# Patient Record
Sex: MALE | Race: WHITE | Employment: UNEMPLOYED | URBAN - METROPOLITAN AREA
[De-identification: names, ages, dates, MRNs, and addresses within clinical notes are randomized per-mention and may not be internally consistent; named-entity substitution may affect disease eponyms.]

---

## 2017-10-26 ENCOUNTER — APPOINTMENT (OUTPATIENT)
Dept: GENERAL RADIOLOGY | Facility: CLINIC | Age: 67
DRG: 292 | End: 2017-10-26
Attending: EMERGENCY MEDICINE
Payer: COMMERCIAL

## 2017-10-26 ENCOUNTER — HOSPITAL ENCOUNTER (INPATIENT)
Facility: CLINIC | Age: 67
LOS: 5 days | Discharge: HOME | DRG: 292 | End: 2017-10-31
Attending: EMERGENCY MEDICINE | Admitting: INTERNAL MEDICINE
Payer: COMMERCIAL

## 2017-10-26 ENCOUNTER — OFFICE VISIT (OUTPATIENT)
Dept: URGENT CARE | Facility: URGENT CARE | Age: 67
End: 2017-10-26
Payer: COMMERCIAL

## 2017-10-26 ENCOUNTER — APPOINTMENT (OUTPATIENT)
Dept: CT IMAGING | Facility: CLINIC | Age: 67
DRG: 292 | End: 2017-10-26
Attending: EMERGENCY MEDICINE
Payer: COMMERCIAL

## 2017-10-26 VITALS
DIASTOLIC BLOOD PRESSURE: 118 MMHG | WEIGHT: 198.19 LBS | HEART RATE: 87 BPM | OXYGEN SATURATION: 93 % | TEMPERATURE: 96.9 F | BODY MASS INDEX: 31.27 KG/M2 | SYSTOLIC BLOOD PRESSURE: 200 MMHG

## 2017-10-26 DIAGNOSIS — I50.21 ACUTE SYSTOLIC CONGESTIVE HEART FAILURE (H): ICD-10-CM

## 2017-10-26 DIAGNOSIS — R03.0 ELEVATED BLOOD PRESSURE READING: ICD-10-CM

## 2017-10-26 DIAGNOSIS — R42 DIZZINESS: Primary | ICD-10-CM

## 2017-10-26 DIAGNOSIS — I16.9 HYPERTENSIVE CRISIS: ICD-10-CM

## 2017-10-26 DIAGNOSIS — I73.9 PAD (PERIPHERAL ARTERY DISEASE) (H): Primary | ICD-10-CM

## 2017-10-26 DIAGNOSIS — R06.02 SHORTNESS OF BREATH: ICD-10-CM

## 2017-10-26 PROBLEM — I16.1 HYPERTENSIVE EMERGENCY: Status: ACTIVE | Noted: 2017-10-26

## 2017-10-26 LAB
ALBUMIN SERPL-MCNC: 3.3 G/DL (ref 3.4–5)
ALP SERPL-CCNC: 64 U/L (ref 40–150)
ALT SERPL W P-5'-P-CCNC: 26 U/L (ref 0–70)
ANION GAP SERPL CALCULATED.3IONS-SCNC: 10 MMOL/L (ref 3–14)
AST SERPL W P-5'-P-CCNC: 14 U/L (ref 0–45)
BASE EXCESS BLDV CALC-SCNC: 0.4 MMOL/L
BASOPHILS # BLD AUTO: 0 10E9/L (ref 0–0.2)
BASOPHILS NFR BLD AUTO: 0.2 %
BILIRUB SERPL-MCNC: 0.8 MG/DL (ref 0.2–1.3)
BUN SERPL-MCNC: 16 MG/DL (ref 7–30)
CALCIUM SERPL-MCNC: 9 MG/DL (ref 8.5–10.1)
CHLORIDE SERPL-SCNC: 102 MMOL/L (ref 94–109)
CO2 SERPL-SCNC: 23 MMOL/L (ref 20–32)
CREAT SERPL-MCNC: 0.94 MG/DL (ref 0.66–1.25)
DIFFERENTIAL METHOD BLD: ABNORMAL
EOSINOPHIL # BLD AUTO: 0.1 10E9/L (ref 0–0.7)
EOSINOPHIL NFR BLD AUTO: 0.7 %
ERYTHROCYTE [DISTWIDTH] IN BLOOD BY AUTOMATED COUNT: 12.8 % (ref 10–15)
GFR SERPL CREATININE-BSD FRML MDRD: 81 ML/MIN/1.7M2
GLUCOSE SERPL-MCNC: 118 MG/DL (ref 70–99)
HCO3 BLDV-SCNC: 24 MMOL/L (ref 21–28)
HCT VFR BLD AUTO: 40.4 % (ref 40–53)
HGB BLD-MCNC: 14.4 G/DL (ref 13.3–17.7)
IMM GRANULOCYTES # BLD: 0 10E9/L (ref 0–0.4)
IMM GRANULOCYTES NFR BLD: 0.2 %
INTERPRETATION ECG - MUSE: NORMAL
LACTATE BLD-SCNC: 1 MMOL/L (ref 0.7–2)
LYMPHOCYTES # BLD AUTO: 0.6 10E9/L (ref 0.8–5.3)
LYMPHOCYTES NFR BLD AUTO: 4.6 %
MCH RBC QN AUTO: 30.6 PG (ref 26.5–33)
MCHC RBC AUTO-ENTMCNC: 35.6 G/DL (ref 31.5–36.5)
MCV RBC AUTO: 86 FL (ref 78–100)
MONOCYTES # BLD AUTO: 0.8 10E9/L (ref 0–1.3)
MONOCYTES NFR BLD AUTO: 5.9 %
NEUTROPHILS # BLD AUTO: 11.4 10E9/L (ref 1.6–8.3)
NEUTROPHILS NFR BLD AUTO: 88.4 %
NRBC # BLD AUTO: 0 10*3/UL
NRBC BLD AUTO-RTO: 0 /100
NT-PROBNP SERPL-MCNC: 3149 PG/ML (ref 0–900)
OXYHGB MFR BLDV: 69 %
PCO2 BLDV: 35 MM HG (ref 40–50)
PH BLDV: 7.45 PH (ref 7.32–7.43)
PLATELET # BLD AUTO: 154 10E9/L (ref 150–450)
PO2 BLDV: 34 MM HG (ref 25–47)
POTASSIUM SERPL-SCNC: 3.7 MMOL/L (ref 3.4–5.3)
PROT SERPL-MCNC: 7.4 G/DL (ref 6.8–8.8)
RBC # BLD AUTO: 4.71 10E12/L (ref 4.4–5.9)
SODIUM SERPL-SCNC: 135 MMOL/L (ref 133–144)
TROPONIN I SERPL-MCNC: 0.03 UG/L (ref 0–0.04)
WBC # BLD AUTO: 12.9 10E9/L (ref 4–11)

## 2017-10-26 PROCEDURE — 83735 ASSAY OF MAGNESIUM: CPT | Performed by: INTERNAL MEDICINE

## 2017-10-26 PROCEDURE — 25000125 ZZHC RX 250: Performed by: EMERGENCY MEDICINE

## 2017-10-26 PROCEDURE — 82805 BLOOD GASES W/O2 SATURATION: CPT | Performed by: EMERGENCY MEDICINE

## 2017-10-26 PROCEDURE — 71260 CT THORAX DX C+: CPT

## 2017-10-26 PROCEDURE — 96366 THER/PROPH/DIAG IV INF ADDON: CPT

## 2017-10-26 PROCEDURE — 21000000 ZZH R&B IMCU HEART CARE

## 2017-10-26 PROCEDURE — 36415 COLL VENOUS BLD VENIPUNCTURE: CPT | Performed by: INTERNAL MEDICINE

## 2017-10-26 PROCEDURE — 99223 1ST HOSP IP/OBS HIGH 75: CPT | Mod: AI | Performed by: INTERNAL MEDICINE

## 2017-10-26 PROCEDURE — 83880 ASSAY OF NATRIURETIC PEPTIDE: CPT | Performed by: EMERGENCY MEDICINE

## 2017-10-26 PROCEDURE — 93000 ELECTROCARDIOGRAM COMPLETE: CPT | Performed by: NURSE PRACTITIONER

## 2017-10-26 PROCEDURE — 80053 COMPREHEN METABOLIC PANEL: CPT | Performed by: EMERGENCY MEDICINE

## 2017-10-26 PROCEDURE — 99207 ZZC OFFICE-HOSPITAL ADMIT: CPT | Performed by: NURSE PRACTITIONER

## 2017-10-26 PROCEDURE — 25000128 H RX IP 250 OP 636: Performed by: EMERGENCY MEDICINE

## 2017-10-26 PROCEDURE — 84484 ASSAY OF TROPONIN QUANT: CPT | Performed by: EMERGENCY MEDICINE

## 2017-10-26 PROCEDURE — 85025 COMPLETE CBC W/AUTO DIFF WBC: CPT | Performed by: EMERGENCY MEDICINE

## 2017-10-26 PROCEDURE — 25000128 H RX IP 250 OP 636

## 2017-10-26 PROCEDURE — 25000132 ZZH RX MED GY IP 250 OP 250 PS 637: Performed by: EMERGENCY MEDICINE

## 2017-10-26 PROCEDURE — 96365 THER/PROPH/DIAG IV INF INIT: CPT

## 2017-10-26 PROCEDURE — 93005 ELECTROCARDIOGRAM TRACING: CPT

## 2017-10-26 PROCEDURE — 25000132 ZZH RX MED GY IP 250 OP 250 PS 637

## 2017-10-26 PROCEDURE — 71020 XR CHEST 2 VW: CPT

## 2017-10-26 PROCEDURE — 99285 EMERGENCY DEPT VISIT HI MDM: CPT | Mod: 25

## 2017-10-26 PROCEDURE — 96375 TX/PRO/DX INJ NEW DRUG ADDON: CPT

## 2017-10-26 PROCEDURE — 83605 ASSAY OF LACTIC ACID: CPT | Performed by: INTERNAL MEDICINE

## 2017-10-26 PROCEDURE — 84484 ASSAY OF TROPONIN QUANT: CPT | Performed by: INTERNAL MEDICINE

## 2017-10-26 PROCEDURE — 84443 ASSAY THYROID STIM HORMONE: CPT | Performed by: INTERNAL MEDICINE

## 2017-10-26 PROCEDURE — 25000128 H RX IP 250 OP 636: Performed by: INTERNAL MEDICINE

## 2017-10-26 RX ORDER — POTASSIUM CHLORIDE 1500 MG/1
20-40 TABLET, EXTENDED RELEASE ORAL
Status: DISCONTINUED | OUTPATIENT
Start: 2017-10-26 | End: 2017-10-30

## 2017-10-26 RX ORDER — ONDANSETRON 2 MG/ML
4 INJECTION INTRAMUSCULAR; INTRAVENOUS EVERY 6 HOURS PRN
Status: DISCONTINUED | OUTPATIENT
Start: 2017-10-26 | End: 2017-10-31 | Stop reason: HOSPADM

## 2017-10-26 RX ORDER — LATANOPROST 50 UG/ML
1 SOLUTION/ DROPS OPHTHALMIC AT BEDTIME
COMMUNITY

## 2017-10-26 RX ORDER — FUROSEMIDE 10 MG/ML
20 INJECTION INTRAMUSCULAR; INTRAVENOUS DAILY
Status: DISCONTINUED | OUTPATIENT
Start: 2017-10-27 | End: 2017-10-27

## 2017-10-26 RX ORDER — PROCHLORPERAZINE 25 MG
12.5 SUPPOSITORY, RECTAL RECTAL EVERY 12 HOURS PRN
Status: DISCONTINUED | OUTPATIENT
Start: 2017-10-26 | End: 2017-10-31 | Stop reason: HOSPADM

## 2017-10-26 RX ORDER — AMOXICILLIN 250 MG
1-2 CAPSULE ORAL 2 TIMES DAILY PRN
Status: DISCONTINUED | OUTPATIENT
Start: 2017-10-26 | End: 2017-10-31 | Stop reason: HOSPADM

## 2017-10-26 RX ORDER — LABETALOL HYDROCHLORIDE 5 MG/ML
20 INJECTION, SOLUTION INTRAVENOUS ONCE
Status: COMPLETED | OUTPATIENT
Start: 2017-10-26 | End: 2017-10-26

## 2017-10-26 RX ORDER — NITROGLYCERIN 20 MG/100ML
INJECTION INTRAVENOUS
Status: COMPLETED
Start: 2017-10-26 | End: 2017-10-26

## 2017-10-26 RX ORDER — LABETALOL HYDROCHLORIDE 5 MG/ML
INJECTION, SOLUTION INTRAVENOUS
Status: COMPLETED
Start: 2017-10-26 | End: 2017-10-26

## 2017-10-26 RX ORDER — LEVETIRACETAM 500 MG/1
500 TABLET ORAL 2 TIMES DAILY
Status: DISCONTINUED | OUTPATIENT
Start: 2017-10-26 | End: 2017-10-31 | Stop reason: HOSPADM

## 2017-10-26 RX ORDER — ONDANSETRON 4 MG/1
4 TABLET, ORALLY DISINTEGRATING ORAL EVERY 6 HOURS PRN
Status: DISCONTINUED | OUTPATIENT
Start: 2017-10-26 | End: 2017-10-31 | Stop reason: HOSPADM

## 2017-10-26 RX ORDER — POLYETHYLENE GLYCOL 3350 17 G/17G
17 POWDER, FOR SOLUTION ORAL DAILY PRN
Status: DISCONTINUED | OUTPATIENT
Start: 2017-10-26 | End: 2017-10-31 | Stop reason: HOSPADM

## 2017-10-26 RX ORDER — NITROGLYCERIN 0.4 MG/1
0.4 TABLET SUBLINGUAL EVERY 5 MIN PRN
Status: DISCONTINUED | OUTPATIENT
Start: 2017-10-26 | End: 2017-10-26

## 2017-10-26 RX ORDER — METOPROLOL TARTRATE 1 MG/ML
5 INJECTION, SOLUTION INTRAVENOUS ONCE
Status: COMPLETED | OUTPATIENT
Start: 2017-10-26 | End: 2017-10-26

## 2017-10-26 RX ORDER — PROCHLORPERAZINE MALEATE 5 MG
5 TABLET ORAL EVERY 6 HOURS PRN
Status: DISCONTINUED | OUTPATIENT
Start: 2017-10-26 | End: 2017-10-31 | Stop reason: HOSPADM

## 2017-10-26 RX ORDER — NALOXONE HYDROCHLORIDE 0.4 MG/ML
.1-.4 INJECTION, SOLUTION INTRAMUSCULAR; INTRAVENOUS; SUBCUTANEOUS
Status: DISCONTINUED | OUTPATIENT
Start: 2017-10-26 | End: 2017-10-31 | Stop reason: HOSPADM

## 2017-10-26 RX ORDER — FUROSEMIDE 10 MG/ML
INJECTION INTRAMUSCULAR; INTRAVENOUS
Status: COMPLETED
Start: 2017-10-26 | End: 2017-10-26

## 2017-10-26 RX ORDER — ASPIRIN 81 MG/1
324 TABLET, CHEWABLE ORAL ONCE
Status: COMPLETED | OUTPATIENT
Start: 2017-10-26 | End: 2017-10-26

## 2017-10-26 RX ORDER — IOPAMIDOL 755 MG/ML
72 INJECTION, SOLUTION INTRAVASCULAR ONCE
Status: COMPLETED | OUTPATIENT
Start: 2017-10-26 | End: 2017-10-26

## 2017-10-26 RX ORDER — POTASSIUM CHLORIDE 1.5 G/1.58G
20-40 POWDER, FOR SOLUTION ORAL
Status: DISCONTINUED | OUTPATIENT
Start: 2017-10-26 | End: 2017-10-30

## 2017-10-26 RX ORDER — POTASSIUM CHLORIDE 29.8 MG/ML
20 INJECTION INTRAVENOUS
Status: DISCONTINUED | OUTPATIENT
Start: 2017-10-26 | End: 2017-10-30

## 2017-10-26 RX ORDER — HYDRALAZINE HYDROCHLORIDE 20 MG/ML
10 INJECTION INTRAMUSCULAR; INTRAVENOUS EVERY 4 HOURS PRN
Status: DISCONTINUED | OUTPATIENT
Start: 2017-10-26 | End: 2017-10-31 | Stop reason: HOSPADM

## 2017-10-26 RX ORDER — NITROGLYCERIN 20 MG/100ML
.07-2 INJECTION INTRAVENOUS CONTINUOUS
Status: DISCONTINUED | OUTPATIENT
Start: 2017-10-26 | End: 2017-10-26

## 2017-10-26 RX ORDER — FUROSEMIDE 10 MG/ML
20 INJECTION INTRAMUSCULAR; INTRAVENOUS ONCE
Status: COMPLETED | OUTPATIENT
Start: 2017-10-26 | End: 2017-10-26

## 2017-10-26 RX ORDER — NITROGLYCERIN 0.4 MG/1
TABLET SUBLINGUAL
Status: COMPLETED
Start: 2017-10-26 | End: 2017-10-26

## 2017-10-26 RX ORDER — MAGNESIUM SULFATE HEPTAHYDRATE 40 MG/ML
4 INJECTION, SOLUTION INTRAVENOUS EVERY 4 HOURS PRN
Status: DISCONTINUED | OUTPATIENT
Start: 2017-10-26 | End: 2017-10-31 | Stop reason: HOSPADM

## 2017-10-26 RX ORDER — BISACODYL 10 MG
10 SUPPOSITORY, RECTAL RECTAL DAILY PRN
Status: DISCONTINUED | OUTPATIENT
Start: 2017-10-26 | End: 2017-10-31 | Stop reason: HOSPADM

## 2017-10-26 RX ORDER — ACETAMINOPHEN 325 MG/1
650 TABLET ORAL EVERY 4 HOURS PRN
Status: DISCONTINUED | OUTPATIENT
Start: 2017-10-26 | End: 2017-10-31 | Stop reason: HOSPADM

## 2017-10-26 RX ORDER — LIDOCAINE 40 MG/G
CREAM TOPICAL
Status: DISCONTINUED | OUTPATIENT
Start: 2017-10-26 | End: 2017-10-31 | Stop reason: HOSPADM

## 2017-10-26 RX ORDER — NITROGLYCERIN 20 MG/100ML
0.07-2 INJECTION INTRAVENOUS CONTINUOUS
Status: DISCONTINUED | OUTPATIENT
Start: 2017-10-26 | End: 2017-10-30

## 2017-10-26 RX ORDER — POTASSIUM CL/LIDO/0.9 % NACL 10MEQ/0.1L
10 INTRAVENOUS SOLUTION, PIGGYBACK (ML) INTRAVENOUS
Status: DISCONTINUED | OUTPATIENT
Start: 2017-10-26 | End: 2017-10-30

## 2017-10-26 RX ORDER — TRAZODONE HYDROCHLORIDE 50 MG/1
25 TABLET, FILM COATED ORAL AT BEDTIME
COMMUNITY

## 2017-10-26 RX ORDER — LABETALOL HYDROCHLORIDE 5 MG/ML
10 INJECTION, SOLUTION INTRAVENOUS
Status: DISCONTINUED | OUTPATIENT
Start: 2017-10-26 | End: 2017-10-31 | Stop reason: HOSPADM

## 2017-10-26 RX ORDER — POTASSIUM CHLORIDE 7.45 MG/ML
10 INJECTION INTRAVENOUS
Status: DISCONTINUED | OUTPATIENT
Start: 2017-10-26 | End: 2017-10-30

## 2017-10-26 RX ADMIN — NITROGLYCERIN: 0.4 TABLET SUBLINGUAL at 19:20

## 2017-10-26 RX ADMIN — NITROGLYCERIN 0.07 MCG/KG/MIN: 20 INJECTION INTRAVENOUS at 20:16

## 2017-10-26 RX ADMIN — IOPAMIDOL 72 ML: 755 INJECTION, SOLUTION INTRAVENOUS at 21:11

## 2017-10-26 RX ADMIN — NITROGLYCERIN: 0.4 TABLET SUBLINGUAL at 19:35

## 2017-10-26 RX ADMIN — FUROSEMIDE 20 MG: 10 INJECTION, SOLUTION INTRAVENOUS at 22:12

## 2017-10-26 RX ADMIN — FUROSEMIDE 20 MG: 10 INJECTION INTRAMUSCULAR; INTRAVENOUS at 22:12

## 2017-10-26 RX ADMIN — SODIUM CHLORIDE 96 ML: 9 INJECTION, SOLUTION INTRAVENOUS at 21:11

## 2017-10-26 RX ADMIN — Medication 4500 UNITS: at 22:10

## 2017-10-26 RX ADMIN — HEPARIN SODIUM 4500 UNITS: 10000 INJECTION, SOLUTION INTRAVENOUS at 22:10

## 2017-10-26 RX ADMIN — NITROGLYCERIN 1 MCG/KG/MIN: 20 INJECTION INTRAVENOUS at 23:30

## 2017-10-26 RX ADMIN — ASPIRIN 81 MG 324 MG: 81 TABLET ORAL at 20:22

## 2017-10-26 RX ADMIN — METOPROLOL TARTRATE 5 MG: 5 INJECTION INTRAVENOUS at 20:23

## 2017-10-26 RX ADMIN — LABETALOL HYDROCHLORIDE 20 MG: 5 INJECTION, SOLUTION INTRAVENOUS at 22:08

## 2017-10-26 RX ADMIN — NITROGLYCERIN: 0.4 TABLET SUBLINGUAL at 19:26

## 2017-10-26 RX ADMIN — HEPARIN SODIUM 900 UNITS/HR: 10000 INJECTION, SOLUTION INTRAVENOUS at 22:12

## 2017-10-26 ASSESSMENT — ACTIVITIES OF DAILY LIVING (ADL)
DRESS: 0 - INDEPENDENT
RETIRED_EATING: 0-->INDEPENDENT
AMBULATION: 0 - INDEPENDENT
BATHING: 0-->INDEPENDENT
SWALLOWING: 0 - SWALLOWS FOODS/LIQUIDS WITHOUT DIFFICULTY
COMMUNICATION: 0 - UNDERSTANDS/COMMUNICATES WITHOUT DIFFICULTY
AMBULATION: 0-->INDEPENDENT
EATING: 0 - INDEPENDENT
TOILETING: 0-->INDEPENDENT
TOILETING: 0 - INDEPENDENT
BATHING: 0 - INDEPENDENT
TRANSFERRING: 0-->INDEPENDENT
COGNITION: 0 - NO COGNITION ISSUES REPORTED
TRANSFERRING: 0 - INDEPENDENT
FALL_HISTORY_WITHIN_LAST_SIX_MONTHS: NO
DRESS: 0-->INDEPENDENT
RETIRED_COMMUNICATION: 0-->UNDERSTANDS/COMMUNICATES WITHOUT DIFFICULTY
SWALLOWING: 0-->SWALLOWS FOODS/LIQUIDS WITHOUT DIFFICULTY

## 2017-10-26 NOTE — MR AVS SNAPSHOT
"              After Visit Summary   10/26/2017    Aubrey Freeman    MRN: [id number]           Patient Information     Date Of Birth          1950        Visit Information        Provider Department      10/26/2017 6:00 PM Sharda Daugherty NP [facility]        Today's Diagnoses     Dizziness    -  1    Shortness of breath        Elevated blood pressure reading           Follow-ups after your visit        Who to contact     If you have questions or need follow up information about today's clinic visit or your schedule please contact [facility] directly at [phone].  Normal or non-critical lab and imaging results will be communicated to you by Picaboohart, letter or phone within 4 business days after the clinic has received the results. If you do not hear from us within 7 days, please contact the clinic through Picaboohart or phone. If you have a critical or abnormal lab result, we will notify you by phone as soon as possible.  Submit refill requests through Interana or call your pharmacy and they will forward the refill request to us. Please allow 3 business days for your refill to be completed.          Additional Information About Your Visit        MyChart Information     Interana lets you send messages to your doctor, view your test results, renew your prescriptions, schedule appointments and more. To sign up, go to [email or web address] . Click on \"Log in\" on the left side of the screen, which will take you to the Welcome page. Then click on \"Sign up Now\" on the right side of the page.     You will be asked to enter the access code listed below, as well as some personal information. Please follow the directions to create your username and password.     Your access code is: [email or web address]  Expires: 2018  7:16 PM     Your access code will  in 90 days. If you need help or a new code, please call your [place] clinic or [phone].        Care " EveryWhere ID     This is your Care EveryWhere ID. This could be used by other organizations to access your [facility] medical records  [id number]        Your Vitals Were     Pulse Temperature Pulse Oximetry BMI (Body Mass Index)          87 96.9  F (36.1  C) (Oral) 93% 31.27 kg/m2         Blood Pressure from Last 3 Encounters:   10/26/17 (!) 200/118   10/09/12 134/86   12/17/10 116/70    Weight from Last 3 Encounters:   10/26/17 198 lb 3 oz (89.9 kg)   10/09/12 203 lb 1.6 oz (92.1 kg)   12/17/10 214 lb 8 oz (97.3 kg)              We Performed the Following     EKG 12-lead complete w/read - Clinics        Primary Care Provider Office Phone # Fax #    [facility] [phone]       [address] [place] [id number]        Equal Access to Services     CLOVIS MCGEE : Hadii osman ku hadasho Soingaali, waaxda luqadaha, qaybta kaalmada adeegyada, enrico garcia . So [facility] [phone].    ATENCIÓN: Si habla español, tiene a rodriguez disposición servicios gratuitos de asistencia lingüística. Amado al [phone].    We comply with applicable federal civil rights laws and [place] laws. We do not discriminate on the basis of race, color, national origin, age, disability, sex, sexual orientation, or gender identity.            Thank you!     Thank you for choosing [facility]  for your care. Our goal is always to provide you with excellent care. Hearing back from our patients is one way we can continue to improve our services. Please take a few minutes to complete the written survey that you may receive in the mail after your visit with us. Thank you!             Your Updated Medication List - Protect others around you: Learn how to safely use, store and throw away your medicines at www.disposemymeds.org.          This list is accurate as of: 10/26/17  7:16 PM.  Always use your most recent med list.                   Brand Name Dispense Instructions  for use Diagnosis    CO Q 10 PO      None Entered        latanoprost 0.005 % ophthalmic solution    XALATAN     Place 1 drop Into the left eye At Bedtime        LEVETIRACETAM PO      Keppra - 1 tablet twice daily        LYSINE      None Entered        traZODone 50 MG tablet    DESYREL     Take 25 mg by mouth At Bedtime        ZINC ACETATE

## 2017-10-26 NOTE — IP AVS SNAPSHOT
[facility]    [address]    [place] [address]    Phone:  [phone]                                       After Visit Summary   10/26/2017    Aubrey Freeman    MRN: [id number]           After Visit Summary Signature Page     I have received my discharge instructions, and my questions have been answered. I have discussed any challenges I see with this plan with the nurse or doctor.    ..........................................................................................................................................  Patient/Patient Representative Signature      ..........................................................................................................................................  Patient Representative Print Name and Relationship to Patient    ..................................................               ................................................  Date                                            Time    ..........................................................................................................................................  Reviewed by Signature/Title    ...................................................              ..............................................  Date                                                            Time

## 2017-10-26 NOTE — IP AVS SNAPSHOT
MRN:[id number]                      After Visit Summary   10/26/2017    Aubrey Freeman    MRN: [id number]           Thank you!     Thank you for choosing [facility] for your care. Our goal is always to provide you with excellent care. Hearing back from our patients is one way we can continue to improve our services. Please take a few minutes to complete the written survey that you may receive in the mail after you visit with us. Thank you!        Patient Information     Date Of Birth          1950        Designated Caregiver       Most Recent Value    Caregiver    Will someone help with your care after discharge? yes    Name of designated caregiver Kristen    Phone number of caregiver [phone]    Caregiver address [place], [place]      About your hospital stay     You were admitted on:  October 26, 2017 You last received care in the:  [facility]    You were discharged on:  October 31, 2017        Reason for your hospital stay       Pulmonary edema and heart failure related to high blood pressure.                  Who to Call     For medical emergencies, please call 911.  For non-urgent questions about your medical care, please call your primary care provider or clinic, [phone]          Attending Provider     Provider Specialty    Diane Washington MD Emergency Medicine    Ana Bender MD Internal Medicine       Primary Care Provider Office Phone # Fax #    [facility] [phone]      After Care Instructions     Activity       Your activity upon discharge: activity as tolerated            Diet       Follow this diet upon discharge: No added salt.            Discharge Instructions       Call Dr. Yin at Pager [phone] if questions, or Cell Phone [phone].                  Follow-up Appointments     Follow-up and recommended labs and tests        Follow up with primary care provider at [facility] in 3 days to  check Blood pressure and BMP.                  Your next 10 appointments already scheduled     Nov 10, 2017 10:00 AM CST   LAB with [facility]   [facility] [place] ([facility])    [address] [place] [address]   [phone]           Please do not eat 10-12 hours before your appointment if you are coming in fasting for labs on lipids, cholesterol, or glucose (sugar). This does not apply to pregnant women. Water, hot tea and black coffee (with nothing added) are okay. Do not drink other fluids, diet soda or chew gum.            Nov 10, 2017 11:00 AM CST   Return Discharge with MICHEL Lopez CNP   [facility] ([facility])    [address] [place], [place] [address]  [facility] [address]   [phone]            Jan 19, 2018  9:45 AM CST   Return Visit with Dave Miranda MD   [facility] ([facility])    [address] [place], [place] [address]  [facility] [address]   [phone]              Additional Services     Follow-Up with Cardiologist           Follow-Up with Cardiac Advanced Practice Provider                 Future tests that were ordered for you     Basic metabolic panel                 Further instructions from your care team         Nifedipine extended-release tablets  Brand Names: Adalat CC, Afeditab CR, Nifediac CC, Nifedical XL, Procardia XL  What is this medicine?  NIFEDIPINE (judy FED i peen) is a calcium-channel blocker. It affects the amount of calcium found in your heart and muscle cells. This relaxes your blood vessels, which can reduce the amount of work the heart has to do. This medicine is used to treat high blood pressure and chest pain caused by angina.  How should I use this medicine?  Take this medicine by mouth with a glass of water. Follow the directions on the prescription label. Do not cut, crush or chew. Take your doses at  regular intervals. Do not take your medicine more often then directed. Do not suddenly stop taking this medicine. Your doctor will tell you how much medicine to take. If your doctor wants you to stop the medicine, the dose will be slowly lowered over time to avoid any side effects.  Talk to your pediatrician regarding the use of this medicine in children. Special care may be needed.  What side effects may I notice from receiving this medicine?  Side effects that you should report to your doctor or health care professional as soon as possible:    blood in the urine    difficulty breathing    fast heartbeat, palpitations, irregular heartbeat, chest pain    redness, blistering, peeling or loosening of the skin, including inside the mouth    reduced amount of urine passed    skin rash    swelling of the legs and ankles  Side effects that usually do not require medical attention (report to your doctor or health care professional if they continue or are bothersome):    constipation    facial flushing    headache    weakness or tiredness  What may interact with this medicine?  Do not take this medicine with any of the following medications:    certain medicines for seizures like carbamazepine, phenobarbital, phenytoin    lumacaftor; ivacaftor    rifabutin    rifampin    rifapentine    Genny's Wort  This medicine may also interact with the following medications:    antiviral medicines for HIV or AIDS    certain medicines for blood pressure    certain medicines for diabetes    certain medicines for erectile dysfunction    certain medicines for fungal infections like ketoconazole, fluconazole, and itraconazole    certain medicines for irregular heart beat like flecainide and quinidine    certain medicines that treat or prevent blood clots like warfarin    clarithromycin    digoxin    dolasetron    erythromycin    fluoxetine    grapefruit juice    local or general anesthetics    nefazodone    orlistat    quinupristin;  dalfopristin    sirolimus    stomach acid blockers like cimetidine, ranitidine, omeprazole, or pantoprazole    tacrolimus    valproic acid  What if I miss a dose?  If you miss a dose, take it as soon as you can. If it is almost time for your next dose, take only that dose. Do not take double or extra doses.  Where should I keep my medicine?  Keep out of the reach of children.  Store at room temperature below 30 degrees C (86 degrees F). Protect from moisture and humidity. Keep container tightly closed. Throw away any unused medicine after the expiration date.  What should I tell my health care provider before I take this medicine?  They need to know if you have any of these conditions:    heart problems, low blood pressure, slow or irregular heartbeat    kidney disease    liver disease    previous heart attack    stomach or intestine problems    an unusual or allergic reaction to nifedipine, other medicines, foods, dyes, or preservatives    pregnant or trying to get pregnant    breast-feeding  What should I watch for while using this medicine?  Visit your doctor or health care professional for regular check ups. Check your blood pressure and pulse rate regularly. Ask your doctor or health care professional what your blood pressure and pulse rate should be and when you should contact him or her.  You may get drowsy or dizzy. Do not drive, use machinery, or do anything that needs mental alertness until you know how this medicine affects you. Do not stand or sit up quickly, especially if you are an older patient. This reduces the risk of dizzy or fainting spells. Alcohol may interfere with the effect of this medicine. Avoid alcoholic drinks.  If you are taking Procardia XL, you may notice the empty shell of the tablet in your stool.  NOTE:This sheet is a summary. It may not cover all possible information. If you have questions about this medicine, talk to your doctor, pharmacist, or health care provider. Copyright   "2017 Elsevier          Pending Results     No orders found from 10/24/2017 to 10/27/2017.            Statement of Approval     Ordered          10/31/17 0907  I have reviewed and agree with all the recommendations and orders detailed in this document.  EFFECTIVE NOW     Approved and electronically signed by:  Jered Yin MD             Admission Information     Date & Time Provider Department Dept. Phone    10/26/2017 Ana Bender MD [facility] [phone]      Your Vitals Were     Blood Pressure Pulse Temperature Respirations Height Weight    149/94 57 97.6  F (36.4  C) (Oral) 16 1.702 m (5' 7\") 86.1 kg (189 lb 14.4 oz)    Pulse Oximetry BMI (Body Mass Index)                98% 29.74 kg/m2          MyChart Information     [facility] lets you send messages to your doctor, view your test results, renew your prescriptions, schedule appointments and more. To sign up, go to [email or web address] . Click on \"Log in\" on the left side of the screen, which will take you to the Welcome page. Then click on \"Sign up Now\" on the right side of the page.     You will be asked to enter the access code listed below, as well as some personal information. Please follow the directions to create your username and password.     Your access code is: [email or web address]  Expires: 2018  7:16 PM     Your access code will  in 90 days. If you need help or a new code, please call your [facility] clinic or [phone].        Care EveryWhere ID     This is your Care EveryWhere ID. This could be used by other organizations to access your [facility] medical records  [id number]        Equal Access to Services     [facility]: Hadii aad bairon hadasho Soingaali, waaxda luqadaha, qaybta kaalmada adeegyada, enrico garcia . So [facility] [phone].    ATENCIÓN: Si habla español, tiene a rodriguez disposición servicios gratuitos de asistencia lingüística. Llame al [phone].    We " comply with applicable federal civil rights laws and [place] laws. We do not discriminate on the basis of race, color, national origin, age, disability, sex, sexual orientation, or gender identity.               Review of your medicines      START taking        Dose / Directions    aspirin 81 MG EC tablet   Used for:  Acute systolic congestive heart failure (H)        Dose:  81 mg   Take 1 tablet (81 mg) by mouth daily   Quantity:  100 tablet   Refills:  3       carvedilol 25 MG tablet   Commonly known as:  COREG   Used for:  Hypertensive crisis        Dose:  25 mg   Take 1 tablet (25 mg) by mouth 2 times daily (with meals)   Quantity:  60 tablet   Refills:  3       hydrALAZINE 50 MG tablet   Commonly known as:  APRESOLINE   Used for:  Hypertensive crisis        Dose:  50 mg   Take 1 tablet (50 mg) by mouth 3 times daily   Quantity:  90 tablet   Refills:  3       NIFEdipine ER 60 MG 24 hr tablet   Commonly known as:  ADALAT CC   Used for:  Hypertensive crisis        Dose:  60 mg   Start taking on:  11/1/2017   Take 1 tablet (60 mg) by mouth daily   Quantity:  30 tablet   Refills:  0         CONTINUE these medicines which have NOT CHANGED        Dose / Directions    CO Q 10 PO        1 tab daily   Refills:  0       latanoprost 0.005 % ophthalmic solution   Commonly known as:  XALATAN        Dose:  1 drop   Place 1 drop Into the left eye At Bedtime   Refills:  0       LEVETIRACETAM PO        Keppra 500 mg twice daily   Refills:  0       LYSINE        1 tab daily   Refills:  0       traZODone 50 MG tablet   Commonly known as:  DESYREL        Dose:  25 mg   Take 25 mg by mouth At Bedtime   Refills:  0       ZINC ACETATE        Dose:  1 tablet   1 tablet daily   Refills:  0            Where to get your medicines      These medications were sent to [facility] - [address]  [address] [place] [address]     Phone:  [phone]     aspirin 81 MG EC tablet    carvedilol 25  MG tablet    hydrALAZINE 50 MG tablet    NIFEdipine ER 60 MG 24 hr tablet                Protect others around you: Learn how to safely use, store and throw away your medicines at www.disposemymeds.org.             Medication List: This is a list of all your medications and when to take them. Check marks below indicate your daily home schedule. Keep this list as a reference.      Medications           Morning Afternoon Evening Bedtime As Needed    aspirin 81 MG EC tablet   Take 1 tablet (81 mg) by mouth daily   Last time this was given:  81 mg on 10/31/2017  7:58 AM   Next Dose Due:  Tomorrow a.m. (11/1)                                   carvedilol 25 MG tablet   Commonly known as:  COREG   Take 1 tablet (25 mg) by mouth 2 times daily (with meals)   Last time this was given:  12.5 mg on 10/31/2017 11:18 AM   Next Dose Due:  This evening with supper (10/31)                                      CO Q 10 PO   1 tab daily   Next Dose Due:  Take today 10/31/17                                   hydrALAZINE 50 MG tablet   Commonly known as:  APRESOLINE   Take 1 tablet (50 mg) by mouth 3 times daily   Last time this was given:  50 mg on 10/31/2017  7:57 AM   Next Dose Due:  Take this afternoon at around 3 pm and then again at bedtime                                         latanoprost 0.005 % ophthalmic solution   Commonly known as:  XALATAN   Place 1 drop Into the left eye At Bedtime   Next Dose Due:  Take tonight (10/31)                                   LEVETIRACETAM PO   Keppra 500 mg twice daily   Last time this was given:  500 mg on 10/31/2017  7:57 AM   Next Dose Due:  Take this evening (10/31)                                      LYSINE   1 tab daily   Next Dose Due:  Take today (10/31)                                   NIFEdipine ER 60 MG 24 hr tablet   Commonly known as:  ADALAT CC   Take 1 tablet (60 mg) by mouth daily   Start taking on:  11/1/2017   Next Dose Due:  Tomorrow 11/1 AM                                     traZODone 50 MG tablet   Commonly known as:  DESYREL   Take 25 mg by mouth At Bedtime   Last time this was given:  25 mg on 10/30/2017  8:39 PM   Next Dose Due:  Take tonight (10/31)                                   ZINC ACETATE   1 tablet daily   Next Dose Due:  (Take today 10/31)                                             More Information        Patient Education    Carvedilol Oral capsule, extended-release    Carvedilol Oral tablet  Carvedilol Oral tablet  What is this medicine?  CARVEDILOL (CHEMO ve dil ol) is a beta-blocker. Beta-blockers reduce the workload on the heart and help it to beat more regularly. This medicine is used to treat high blood pressure and heart failure.  This medicine may be used for other purposes; ask your health care provider or pharmacist if you have questions.  What should I tell my health care provider before I take this medicine?  They need to know if you have any of these conditions:    circulation problems    diabetes    history of heart attack or heart disease    liver disease    lung or breathing disease, like asthma or emphysema    pheochromocytoma    slow or irregular heartbeat    thyroid disease    an unusual or allergic reaction to carvedilol, other beta-blockers, medicines, foods, dyes, or preservatives    pregnant or trying to get pregnant    breast-feeding  How should I use this medicine?  Take this medicine by mouth with a glass of water. Follow the directions on the prescription label. It is best to take the tablets with food. Take your doses at regular intervals. Do not take your medicine more often than directed. Do not stop taking except on the advice of your doctor or health care professional.  Talk to your pediatrician regarding the use of this medicine in children. Special care may be needed.  Overdosage: If you think you have taken too much of this medicine contact a poison control center or emergency room at once.  NOTE: This medicine is only for you. Do not  share this medicine with others.  What if I miss a dose?  If you miss a dose, take it as soon as you can. If it is almost time for your next dose, take only that dose. Do not take double or extra doses.  What may interact with this medicine?  This medicine may interact with the following medications:    certain medicines for blood pressure, heart disease, irregular heart beat    certain medicines for depression, like fluoxetine or paroxetine    certain medicines for diabetes, like glipizide or glyburide    cimetidine    clonidine    cyclosporine    digoxin    MAOIs like Carbex, Eldepryl, Marplan, Nardil, and Parnate    reserpine    rifampin  This list may not describe all possible interactions. Give your health care provider a list of all the medicines, herbs, non-prescription drugs, or dietary supplements you use. Also tell them if you smoke, drink alcohol, or use illegal drugs. Some items may interact with your medicine.  What should I watch for while using this medicine?  Check your heart rate and blood pressure regularly while you are taking this medicine. Ask your doctor or health care professional what your heart rate and blood pressure should be, and when you should contact him or her. Do not stop taking this medicine suddenly. This could lead to serious heart-related effects.  Contact your doctor or health care professional if you have difficulty breathing while taking this drug.  Check your weight daily. Ask your doctor or health care professional when you should notify him/her of any weight gain.  You may get drowsy or dizzy. Do not drive, use machinery, or do anything that requires mental alertness until you know how this medicine affects you. To reduce the risk of dizzy or fainting spells, do not sit or stand up quickly. Alcohol can make you more drowsy, and increase flushing and rapid heartbeats. Avoid alcoholic drinks.  If you have diabetes, check your blood sugar as directed. Tell your doctor if you  have changes in your blood sugar while you are taking this medicine.  If you are going to have surgery, tell your doctor or health care professional that you are taking this medicine.  What side effects may I notice from receiving this medicine?  Side effects that you should report to your doctor or health care professional as soon as possible:    allergic reactions like skin rash, itching or hives, swelling of the face, lips, or tongue    breathing problems    dark urine    irregular heartbeat    swollen legs or ankles    vomiting    yellowing of the eyes or skin  Side effects that usually do not require medical attention (report to your doctor or health care professional if they continue or are bothersome):    change in sex drive or performance    diarrhea    dry eyes (especially if wearing contact lenses)    dry, itching skin    headache    nausea    unusually tired  This list may not describe all possible side effects. Call your doctor for medical advice about side effects. You may report side effects to FDA at 2-274-FDA-7781.  Where should I keep my medicine?  Keep out of the reach of children.  Store at room temperature below 30 degrees C (86 degrees F). Protect from moisture. Keep container tightly closed. Throw away any unused medicine after the expiration date.  NOTE:This sheet is a summary. It may not cover all possible information. If you have questions about this medicine, talk to your doctor, pharmacist, or health care provider. Copyright  2016 [employer]                Hydralazine tablets  Brand Name: Apresoline  What is this medicine?  HYDRALAZINE (alise archuleta) is a type of vasodilator. It relaxes blood vessels, increasing the blood and oxygen supply to your heart. This medicine is used to treat high blood pressure.  How should I use this medicine?  Take this medicine by mouth with a glass of water. Follow the directions on the prescription label. Take your doses at regular intervals. Do not  take your medicine more often than directed. Do not stop taking except on the advice of your doctor or health care professional.  Talk to your pediatrician regarding the use of this medicine in children. Special care may be needed. While this drug may be prescribed for children for selected conditions, precautions do apply.  What side effects may I notice from receiving this medicine?  Side effects that you should report to your doctor or health care professional as soon as possible:    chest pain, or fast or irregular heartbeat    fever, chills, or sore throat    numbness or tingling in the hands or feet    shortness of breath    skin rash, redness, blisters or itching    stiff or swollen joints    sudden weight gain    swelling of the feet or legs    swollen lymph glands    unusual weakness  Side effects that usually do not require medical attention (report to your doctor or health care professional if they continue or are bothersome):    diarrhea, or constipation    headache    loss of appetite    nausea, vomiting  What may interact with this medicine?    medicines for high blood pressure    medicines for mental depression  What if I miss a dose?  If you miss a dose, take it as soon as you can. If it is almost time for your next dose, take only that dose. Do not take double or extra doses.  Where should I keep my medicine?  Keep out of the reach of children.  Store at room temperature between 15 and 30 degrees C (59 and 86 degrees F). Throw away any unused medicine after the expiration date.  What should I tell my health care provider before I take this medicine?  They need to know if you have any of these conditions:    blood vessel disease    heart disease including angina or history of heart attack    kidney or liver disease    systemic lupus erythematosus (SLE)    an unusual or allergic reaction to hydralazine, tartrazine dye, other medicines, foods, dyes, or preservatives    pregnant or trying to get  pregnant    breast-feeding  What should I watch for while using this medicine?  Visit your doctor or health care professional for regular checks on your progress. Check your blood pressure and pulse rate regularly. Ask your doctor or health care professional what your blood pressure and pulse rate should be and when you should contact him or her.  You may get drowsy or dizzy. Do not drive, use machinery, or do anything that needs mental alertness until you know how this medicine affects you. Do not stand or sit up quickly, especially if you are an older patient. This reduces the risk of dizzy or fainting spells. Alcohol may interfere with the effect of this medicine. Avoid alcoholic drinks.  Do not treat yourself for coughs, colds, or pain while you are taking this medicine without asking your doctor or health care professional for advice. Some ingredients may increase your blood pressure.  NOTE:This sheet is a summary. It may not cover all possible information. If you have questions about this medicine, talk to your doctor, pharmacist, or health care provider. Copyright  2017 [employer]                Patient Education    Aspirin Chewable tablet    Aspirin Chewing-gum, medicated    Aspirin Gastro-resistant tablet    Aspirin Oral tablet    Aspirin Oral tablet, extended-release    Aspirin Rectal suppository  Aspirin Oral tablet  What is this medicine?  ASPIRIN (AS pir in) is a pain reliever. It is used to treat mild pain and fever. This medicine is also used as directed by a doctor to prevent and to treat heart attacks, to prevent strokes, and to treat arthritis or inflammation.  This medicine may be used for other purposes; ask your health care provider or pharmacist if you have questions.  What should I tell my health care provider before I take this medicine?  They need to know if you have any of these conditions:    anemia    asthma    bleeding problems    child with chickenpox, the flu, or other viral  infection    diabetes    gout    if you frequently drink alcohol containing drinks    kidney disease    liver disease    low level of vitamin K    lupus    smoke tobacco    stomach ulcers or other problems    an unusual or allergic reaction to aspirin, tartrazine dye, other medicines, dyes, or preservatives    pregnant or trying to get pregnant    breast-feeding  How should I use this medicine?  Take this medicine by mouth with a glass of water. Follow the directions on the package or prescription label. You can take this medicine with or without food. If it upsets your stomach, take it with food. Do not take your medicine more often than directed.  Talk to your pediatrician regarding the use of this medicine in children. While this drug may be prescribed for children as young as 12 years of age for selected conditions, precautions do apply. Children and teenagers should not use this medicine to treat chicken pox or flu symptoms unless directed by a doctor.  Patients over 65 years old may have a stronger reaction and need a smaller dose.  Overdosage: If you think you have taken too much of this medicine contact a poison control center or emergency room at once.  NOTE: This medicine is only for you. Do not share this medicine with others.  What if I miss a dose?  If you are taking this medicine on a regular schedule and miss a dose, take it as soon as you can. If it is almost time for your next dose, take only that dose. Do not take double or extra doses.  What may interact with this medicine?  Do not take this medicine with any of the following medications:    cidofovir    ketorolac    probenecid  This medicine may also interact with the following medications:    alcohol    alendronate    bismuth subsalicylate    flavocoxid    herbal supplements like feverfew, garlic, louann, ginkgo biloba, horse chestnut    medicines for diabetes or glaucoma like acetazolamide, methazolamide    medicines for gout    medicines that  treat or prevent blood clots like enoxaparin, heparin, ticlopidine, warfarin    other aspirin and aspirin-like medicines    NSAIDs, medicines for pain and inflammation, like ibuprofen or naproxen    pemetrexed    sulfinpyrazone    varicella live vaccine  This list may not describe all possible interactions. Give your health care provider a list of all the medicines, herbs, non-prescription drugs, or dietary supplements you use. Also tell them if you smoke, drink alcohol, or use illegal drugs. Some items may interact with your medicine.  What should I watch for while using this medicine?  If you are treating yourself for pain, tell your doctor or health care professional if the pain lasts more than 10 days, if it gets worse, or if there is a new or different kind of pain. Tell your doctor if you see redness or swelling. Also, check with your doctor if you have a fever that lasts for more than 3 days. Only take this medicine to prevent heart attacks or blood clotting if prescribed by your doctor or health care professional.  Do not take aspirin or aspirin-like medicines with this medicine. Too much aspirin can be dangerous. Always read the labels carefully.  This medicine can irritate your stomach or cause bleeding problems. Do not smoke cigarettes or drink alcohol while taking this medicine. Do not lie down for 30 minutes after taking this medicine to prevent irritation to your throat.  If you are scheduled for any medical or dental procedure, tell your healthcare provider that you are taking this medicine. You may need to stop taking this medicine before the procedure.  This medicine may be used to treat migraines. If you take migraine medicines for 10 or more days a month, your migraines may get worse. Keep a diary of headache days and medicine use. Contact your healthcare professional if your migraine attacks occur more frequently.  What side effects may I notice from receiving this medicine?  Side effects that  you should report to your doctor or health care professional as soon as possible:    allergic reactions like skin rash, itching or hives, swelling of the face, lips, or tongue    breathing problems    changes in hearing, ringing in the ears    confusion    general ill feeling or flu-like symptoms    pain on swallowing    redness, blistering, peeling or loosening of the skin, including inside the mouth or nose    signs and symptoms of bleeding such as bloody or black, tarry stools; red or dark-brown urine; spitting up blood or brown material that looks like coffee grounds; red spots on the skin; unusual bruising or bleeding from the eye, gums, or nose    trouble passing urine or change in the amount of urine    unusually weak or tired    yellowing of the eyes or skin  Side effects that usually do not require medical attention (report to your doctor or health care professional if they continue or are bothersome):    diarrhea or constipation    headache    nausea, vomiting    stomach gas, heartburn  This list may not describe all possible side effects. Call your doctor for medical advice about side effects. You may report side effects to FDA at 9-327-FDA-5801.  Where should I keep my medicine?  Keep out of the reach of children.  Store at room temperature between 15 and 30 degrees C (59 and 86 degrees F). Protect from heat and moisture. Do not use this medicine if it has a strong vinegar smell. Throw away any unused medicine after the expiration date.  NOTE:This sheet is a summary. It may not cover all possible information. If you have questions about this medicine, talk to your doctor, pharmacist, or health care provider. Copyright  2016 Gold Standard

## 2017-10-26 NOTE — Clinical Note
Admitting Physician: JOVON PEDRAZA [[id number]]   Special needs: No Special Needs [20]   Bed request comments: inpt-CCU

## 2017-10-26 NOTE — NURSING NOTE
"Chief Complaint   Patient presents with     Shortness of Breath     sob and wheezing that has worsen since this morning.     Dizziness     dizziness which started this morning.        Initial BP (!) 200/118  Pulse 87  Temp 96.9  F (36.1  C) (Oral)  Wt 198 lb 3 oz (89.9 kg)  SpO2 93%  BMI 31.27 kg/m2 Estimated body mass index is 31.27 kg/(m^2) as calculated from the following:    Height as of 10/9/12: 5' 6.75\" (1.695 m).    Weight as of this encounter: 198 lb 3 oz (89.9 kg).  Medication Reconciliation: complete    "

## 2017-10-27 ENCOUNTER — APPOINTMENT (OUTPATIENT)
Dept: CARDIOLOGY | Facility: CLINIC | Age: 67
DRG: 292 | End: 2017-10-27
Attending: INTERNAL MEDICINE
Payer: COMMERCIAL

## 2017-10-27 ENCOUNTER — APPOINTMENT (OUTPATIENT)
Dept: ULTRASOUND IMAGING | Facility: CLINIC | Age: 67
DRG: 292 | End: 2017-10-27
Attending: INTERNAL MEDICINE
Payer: COMMERCIAL

## 2017-10-27 LAB
ANION GAP SERPL CALCULATED.3IONS-SCNC: 8 MMOL/L (ref 3–14)
BUN SERPL-MCNC: 16 MG/DL (ref 7–30)
CALCIUM SERPL-MCNC: 8.5 MG/DL (ref 8.5–10.1)
CHLORIDE SERPL-SCNC: 105 MMOL/L (ref 94–109)
CHOLEST SERPL-MCNC: 174 MG/DL
CO2 SERPL-SCNC: 23 MMOL/L (ref 20–32)
CREAT SERPL-MCNC: 0.96 MG/DL (ref 0.66–1.25)
ERYTHROCYTE [DISTWIDTH] IN BLOOD BY AUTOMATED COUNT: 13 % (ref 10–15)
GFR SERPL CREATININE-BSD FRML MDRD: 78 ML/MIN/1.7M2
GLUCOSE SERPL-MCNC: 139 MG/DL (ref 70–99)
HCT VFR BLD AUTO: 35.3 % (ref 40–53)
HDLC SERPL-MCNC: 46 MG/DL
HGB BLD-MCNC: 12.5 G/DL (ref 13.3–17.7)
LDLC SERPL CALC-MCNC: 109 MG/DL
MAGNESIUM SERPL-MCNC: 2.3 MG/DL (ref 1.6–2.3)
MCH RBC QN AUTO: 30.6 PG (ref 26.5–33)
MCHC RBC AUTO-ENTMCNC: 35.4 G/DL (ref 31.5–36.5)
MCV RBC AUTO: 86 FL (ref 78–100)
NONHDLC SERPL-MCNC: 128 MG/DL
PLATELET # BLD AUTO: 152 10E9/L (ref 150–450)
POTASSIUM SERPL-SCNC: 3.8 MMOL/L (ref 3.4–5.3)
RBC # BLD AUTO: 4.09 10E12/L (ref 4.4–5.9)
SODIUM SERPL-SCNC: 136 MMOL/L (ref 133–144)
TRIGL SERPL-MCNC: 94 MG/DL
TROPONIN I SERPL-MCNC: 0.02 UG/L (ref 0–0.04)
TROPONIN I SERPL-MCNC: 0.05 UG/L (ref 0–0.04)
TSH SERPL DL<=0.005 MIU/L-ACNC: 1.38 MU/L (ref 0.4–4)
WBC # BLD AUTO: 10.1 10E9/L (ref 4–11)

## 2017-10-27 PROCEDURE — 82384 ASSAY THREE CATECHOLAMINES: CPT | Performed by: INTERNAL MEDICINE

## 2017-10-27 PROCEDURE — 94660 CPAP INITIATION&MGMT: CPT

## 2017-10-27 PROCEDURE — 36415 COLL VENOUS BLD VENIPUNCTURE: CPT | Performed by: INTERNAL MEDICINE

## 2017-10-27 PROCEDURE — 99233 SBSQ HOSP IP/OBS HIGH 50: CPT | Performed by: INTERNAL MEDICINE

## 2017-10-27 PROCEDURE — 27210339 ZZH CIRCUIT HUMIDITY W/CPAP BIP

## 2017-10-27 PROCEDURE — 93975 VASCULAR STUDY: CPT | Mod: TC

## 2017-10-27 PROCEDURE — 99223 1ST HOSP IP/OBS HIGH 75: CPT | Performed by: INTERNAL MEDICINE

## 2017-10-27 PROCEDURE — 25500064 ZZH RX 255 OP 636: Performed by: INTERNAL MEDICINE

## 2017-10-27 PROCEDURE — 80061 LIPID PANEL: CPT | Performed by: INTERNAL MEDICINE

## 2017-10-27 PROCEDURE — 25000128 H RX IP 250 OP 636: Performed by: INTERNAL MEDICINE

## 2017-10-27 PROCEDURE — 25000132 ZZH RX MED GY IP 250 OP 250 PS 637: Performed by: INTERNAL MEDICINE

## 2017-10-27 PROCEDURE — 93306 TTE W/DOPPLER COMPLETE: CPT | Mod: 26 | Performed by: INTERNAL MEDICINE

## 2017-10-27 PROCEDURE — 40000275 ZZH STATISTIC RCP TIME EA 10 MIN

## 2017-10-27 PROCEDURE — 85027 COMPLETE CBC AUTOMATED: CPT | Performed by: INTERNAL MEDICINE

## 2017-10-27 PROCEDURE — 84484 ASSAY OF TROPONIN QUANT: CPT | Performed by: INTERNAL MEDICINE

## 2017-10-27 PROCEDURE — 40000264 ECHO COMPLETE WITH LUMASON

## 2017-10-27 PROCEDURE — 80048 BASIC METABOLIC PNL TOTAL CA: CPT | Performed by: INTERNAL MEDICINE

## 2017-10-27 PROCEDURE — 21000000 ZZH R&B IMCU HEART CARE

## 2017-10-27 RX ORDER — LISINOPRIL 10 MG/1
10 TABLET ORAL DAILY
Status: DISCONTINUED | OUTPATIENT
Start: 2017-10-27 | End: 2017-10-28

## 2017-10-27 RX ORDER — CARVEDILOL 12.5 MG/1
12.5 TABLET ORAL 2 TIMES DAILY WITH MEALS
Status: DISCONTINUED | OUTPATIENT
Start: 2017-10-27 | End: 2017-10-29

## 2017-10-27 RX ORDER — ASPIRIN 81 MG/1
81 TABLET ORAL DAILY
Status: DISCONTINUED | OUTPATIENT
Start: 2017-10-27 | End: 2017-10-31 | Stop reason: HOSPADM

## 2017-10-27 RX ORDER — FUROSEMIDE 10 MG/ML
20 INJECTION INTRAMUSCULAR; INTRAVENOUS EVERY 8 HOURS
Status: COMPLETED | OUTPATIENT
Start: 2017-10-27 | End: 2017-10-28

## 2017-10-27 RX ADMIN — LEVETIRACETAM 500 MG: 500 TABLET, FILM COATED ORAL at 01:17

## 2017-10-27 RX ADMIN — FUROSEMIDE 20 MG: 10 INJECTION, SOLUTION INTRAVENOUS at 17:27

## 2017-10-27 RX ADMIN — ASPIRIN 81 MG: 81 TABLET, COATED ORAL at 09:53

## 2017-10-27 RX ADMIN — CARVEDILOL 12.5 MG: 12.5 TABLET, FILM COATED ORAL at 18:58

## 2017-10-27 RX ADMIN — SULFUR HEXAFLUORIDE 5 ML: KIT at 11:00

## 2017-10-27 RX ADMIN — Medication 25 MG: at 01:17

## 2017-10-27 RX ADMIN — FUROSEMIDE 20 MG: 10 INJECTION, SOLUTION INTRAVENOUS at 09:55

## 2017-10-27 RX ADMIN — LISINOPRIL 10 MG: 10 TABLET ORAL at 09:53

## 2017-10-27 RX ADMIN — LEVETIRACETAM 500 MG: 500 TABLET, FILM COATED ORAL at 21:57

## 2017-10-27 RX ADMIN — Medication 25 MG: at 21:57

## 2017-10-27 RX ADMIN — NITROGLYCERIN 1 MCG/KG/MIN: 20 INJECTION INTRAVENOUS at 08:08

## 2017-10-27 RX ADMIN — LEVETIRACETAM 500 MG: 500 TABLET, FILM COATED ORAL at 09:53

## 2017-10-27 RX ADMIN — NITROGLYCERIN 0.07 MCG/KG/MIN: 20 INJECTION INTRAVENOUS at 22:15

## 2017-10-27 ASSESSMENT — ENCOUNTER SYMPTOMS
CHILLS: 0
SHORTNESS OF BREATH: 1
COUGH: 0
FEVER: 0
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
## 2017-10-27 NOTE — PHARMACY-ADMISSION MEDICATION HISTORY
Admission medication history interview status for the 10/26/2017  admission is complete. See EPIC admission navigator for prior to admission medications     Medication history source reliability:Good    Actions taken by pharmacist (provider contacted, etc): reviewed med list with patient and spouse.      Additional medication history information not noted on PTA med list : clarified keppra dose with patient and care everywhere    Medication reconciliation/reorder completed by provider prior to medication history? No    Time spent in this activity: 20 min    Prior to Admission medications    Medication Sig Last Dose Taking? Auth Provider   traZODone (DESYREL) 50 MG tablet Take 25 mg by mouth At Bedtime 10/25/2017 at hs Yes Reported, Patient   latanoprost (XALATAN) 0.005 % ophthalmic solution Place 1 drop Into the left eye At Bedtime 10/25/2017 at hs Yes Reported, Patient   ZINC ACETATE 1 tablet daily  10/26/2017 at am Yes Reported, Patient   CO Q 10 OR 1 tab daily 10/26/2017 at am Yes Reported, Patient   LYSINE 1 tab daily 10/26/2017 at am Yes Reported, Patient   LEVETIRACETAM OR Keppra 500 mg twice daily 10/26/2017 at am Yes Reported, Patient

## 2017-10-27 NOTE — ED NOTES
[facility]  ED Nurse Handoff Report    ED Chief complaint: Shortness of Breath (pt at [facility] and felt sob, very HTN  on intial assessment and has chest discomfort.)      ED Diagnosis:   Final diagnoses:   Hypertensive crisis   Acute systolic congestive heart failure (H)       Code Status: Full Code    Allergies:   Allergies   Allergen Reactions     No Known Drug Allergies        Activity level - Baseline/Home:  Independent    Activity Level - Current:   Stand with Assist     Needed?: No    Isolation: No  Infection: Not Applicable    Bariatric?: No    Vital Signs:   Vitals:    10/26/17 2030 10/26/17 2045 10/26/17 2115 10/26/17 2145   BP: (!) 176/114 (!) 173/117 (!) 180/110 (!) 170/114   Resp: 28 (!) 36 (!) 36 (!) 36   Temp:       TempSrc:       SpO2: 93% 94% 96% 95%   Height:           Cardiac Rhythm: ,        Pain level:      Is this patient confused?: No    Patient Report: Initial Complaint: SOB, at clinic and sent here today Patient was dusky on initial assessment. Pt was also complaining of Chest pain/discomfort and his BP was in the 200's initially.   Focused Assessment: alert , orientated, appeared to be in pain, was short of breath . Was given 3 nitro and felt relif of the chest pain but not entirley. On a nitro drip at this time and heparin to be started.   Tests Performed: chest Ct chest Xray labs, Ekg, vitals VBG   Abnormal Results: trop 0.33 BP elevated.   Treatments provided: nitro heparin nitro drip asa metoprolol     Family Comments: Wife was present in the room and was updated on the plan .     OBS brochure/video discussed/provided to patient: N/A    ED Medications:   Medications   nitroGLYcerin (NITROSTAT) sublingual tablet 0.4 mg (not administered)   nitroGLYcerin 50 mg in D5W 250 mL (adult std) infusion (0.09 mcg/kg/min × 89.9 kg Intravenous Rate/Dose Change 10/26/17 2028)   heparin Loading Dose bolus dose from infusion pump 4,500 Units (not administered)    heparin infusion 25,000 units in 0.45% NaCl 250 mL (not administered)   labetalol (NORMODYNE/TRANDATE) injection 20 mg (not administered)   furosemide (LASIX) injection 20 mg (not administered)   nitroGLYcerin (NITROSTAT) 0.4 MG sublingual tablet (  Given 10/26/17 1935)   aspirin chewable tablet 324 mg (324 mg Oral Given 10/26/17 2022)   metoprolol (LOPRESSOR) injection 5 mg (5 mg Intravenous Given 10/26/17 2023)   iopamidol (ISOVUE-370) solution 72 mL (72 mLs Intravenous Given 10/26/17 2111)   Saline Flush (96 mLs Intravenous Given 10/26/17 2111)       Drips infusing?:  Yes      ED NURSE PHONE NUMBER: [phone]

## 2017-10-27 NOTE — ED NOTES
Bed: ED21  Expected date:   Expected time:   Means of arrival:   Comments:  536  67 m sob/cp  1912

## 2017-10-27 NOTE — PROGRESS NOTES
Patient uses CPAP at home and was placed on CPAP 10 21% will continue to monitor  10/27/2017  Suman Bruno

## 2017-10-27 NOTE — PLAN OF CARE
"Problem: Patient Care Overview  Goal: Individualization & Mutuality     Patient came from ED tonight with SOB and chest pain, feeling dizzy and light headed, nitro titrated to 1mcg/min per SBP<160, /87  Pulse 75  Temp 99.7  F (37.6  C) (Oral)  Resp 22  Ht 1.702 m (5' 7\")  Wt 86.2 kg (190 lb)  SpO2 98%  BMI 29.76 kg/m2  Denies chest pain, lasix given in ED with good output, up to bathroom SBA, heparin dc'd at 0000, trops trending down, NPO for Renal US.  Wife bedside, bed alarm in place for safety.      "

## 2017-10-27 NOTE — PLAN OF CARE
Problem: Patient Care Overview  Goal: Plan of Care/Patient Progress Review  Outcome: Improving  A/O, VSS, O2sats 98%, weaned to RA, mild FISCHER. Tele SR/SB 50s-60s. Denies pain. SBA, steady. Nitroglycerin gtt decreased to 0.7 mcg/kg/min, SBP stable in 130s. 24-hour urine collection initiated at 9:15. Renal US, TTE complete. Plan to continue weaning off nitro gtt.
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
## 2017-10-27 NOTE — PROGRESS NOTES
HPI  Pt c/o sob that he woke up with this morning. It has gotten a bit worse throughout the day. + periods of chest tightness, none currently. Denies fever, cough, wheezing. Denies recent illness. Denies diaphoresis, syncope, n/v. Denies calf pain or swelling. Nonsmoker.     ROS  10 point ROS assessed, documented in HPI otherwise negative.     Physical Exam   Constitutional: He is oriented to person, place, and time. He appears distressed.   HENT:   Head: Normocephalic.   Eyes: Conjunctivae are normal.   Neck: Neck supple. No tracheal deviation present.   Cardiovascular: Normal rate and regular rhythm.    Pulmonary/Chest: No stridor.   Resp rate 36/min. Lungs clear but diminished throughout.    Neurological: He is alert and oriented to person, place, and time.   Skin: Skin is warm and dry. He is not diaphoretic. There is pallor.   Psychiatric: Affect and judgment normal.     BP (!) 200/118  Pulse 87  Temp 96.9  F (36.1  C) (Oral)  Wt 198 lb 3 oz (89.9 kg)  SpO2 93%  BMI 31.27 kg/m2      MDM:  DD includes but is not limited to ACS, PE, pneumothorax, PNA, CHF, hypertensive emergency. His BP is 235/131, oxygen sat 93% and he has tachypnea with resp 36/min. He denies HTN hx. He looks a bit pale. O2 2L/min applied to pt. EKG reveals sinus rhythm with nonspecific ST changes. No STEMI. I was not able to compare with a previous EKG. At this point, I think pt needs ER care with transfer by EMS for further work-up. Pt and wife are agreeable. Report given to EMS. Pt leaves in care of EMS.     Dx:  Shortness of breath    Sharda Daugherty, CNP

## 2017-10-28 PROBLEM — J81.1 PULMONARY EDEMA: Status: ACTIVE | Noted: 2017-10-28

## 2017-10-28 PROBLEM — I50.31 ACUTE DIASTOLIC HEART FAILURE (H): Status: ACTIVE | Noted: 2017-10-28

## 2017-10-28 LAB
ANION GAP SERPL CALCULATED.3IONS-SCNC: 8 MMOL/L (ref 3–14)
BASOPHILS # BLD AUTO: 0 10E9/L (ref 0–0.2)
BASOPHILS NFR BLD AUTO: 0.2 %
BUN SERPL-MCNC: 23 MG/DL (ref 7–30)
CALCIUM SERPL-MCNC: 8.3 MG/DL (ref 8.5–10.1)
CHLORIDE SERPL-SCNC: 102 MMOL/L (ref 94–109)
CO2 SERPL-SCNC: 24 MMOL/L (ref 20–32)
CREAT SERPL-MCNC: 1.35 MG/DL (ref 0.66–1.25)
DIFFERENTIAL METHOD BLD: ABNORMAL
EOSINOPHIL # BLD AUTO: 0.3 10E9/L (ref 0–0.7)
EOSINOPHIL NFR BLD AUTO: 2.4 %
ERYTHROCYTE [DISTWIDTH] IN BLOOD BY AUTOMATED COUNT: 13 % (ref 10–15)
GFR SERPL CREATININE-BSD FRML MDRD: 53 ML/MIN/1.7M2
GLUCOSE SERPL-MCNC: 119 MG/DL (ref 70–99)
HCT VFR BLD AUTO: 35 % (ref 40–53)
HGB BLD-MCNC: 12.4 G/DL (ref 13.3–17.7)
IMM GRANULOCYTES # BLD: 0 10E9/L (ref 0–0.4)
IMM GRANULOCYTES NFR BLD: 0.2 %
LACTATE BLD-SCNC: 0.7 MMOL/L (ref 0.7–2)
LYMPHOCYTES # BLD AUTO: 0.8 10E9/L (ref 0.8–5.3)
LYMPHOCYTES NFR BLD AUTO: 6 %
MCH RBC QN AUTO: 30.5 PG (ref 26.5–33)
MCHC RBC AUTO-ENTMCNC: 35.4 G/DL (ref 31.5–36.5)
MCV RBC AUTO: 86 FL (ref 78–100)
MONOCYTES # BLD AUTO: 1.1 10E9/L (ref 0–1.3)
MONOCYTES NFR BLD AUTO: 8.9 %
NEUTROPHILS # BLD AUTO: 10.3 10E9/L (ref 1.6–8.3)
NEUTROPHILS NFR BLD AUTO: 82.3 %
NRBC # BLD AUTO: 0 10*3/UL
NRBC BLD AUTO-RTO: 0 /100
PLATELET # BLD AUTO: 160 10E9/L (ref 150–450)
POTASSIUM SERPL-SCNC: 3.8 MMOL/L (ref 3.4–5.3)
RBC # BLD AUTO: 4.06 10E12/L (ref 4.4–5.9)
SODIUM SERPL-SCNC: 134 MMOL/L (ref 133–144)
WBC # BLD AUTO: 12.5 10E9/L (ref 4–11)

## 2017-10-28 PROCEDURE — 80048 BASIC METABOLIC PNL TOTAL CA: CPT | Performed by: INTERNAL MEDICINE

## 2017-10-28 PROCEDURE — 99233 SBSQ HOSP IP/OBS HIGH 50: CPT | Performed by: INTERNAL MEDICINE

## 2017-10-28 PROCEDURE — 25000128 H RX IP 250 OP 636: Performed by: INTERNAL MEDICINE

## 2017-10-28 PROCEDURE — 85025 COMPLETE CBC W/AUTO DIFF WBC: CPT | Performed by: INTERNAL MEDICINE

## 2017-10-28 PROCEDURE — 83605 ASSAY OF LACTIC ACID: CPT | Performed by: INTERNAL MEDICINE

## 2017-10-28 PROCEDURE — 21000000 ZZH R&B IMCU HEART CARE

## 2017-10-28 PROCEDURE — 36415 COLL VENOUS BLD VENIPUNCTURE: CPT | Performed by: INTERNAL MEDICINE

## 2017-10-28 PROCEDURE — 94660 CPAP INITIATION&MGMT: CPT

## 2017-10-28 PROCEDURE — 25000132 ZZH RX MED GY IP 250 OP 250 PS 637: Performed by: INTERNAL MEDICINE

## 2017-10-28 PROCEDURE — 99207 ZZC CDG-MDM COMPONENT: MEETS MODERATE - UP CODED: CPT | Performed by: INTERNAL MEDICINE

## 2017-10-28 RX ORDER — AMLODIPINE BESYLATE 10 MG/1
10 TABLET ORAL DAILY
Status: DISCONTINUED | OUTPATIENT
Start: 2017-10-29 | End: 2017-10-31

## 2017-10-28 RX ORDER — HYDRALAZINE HYDROCHLORIDE 25 MG/1
25 TABLET, FILM COATED ORAL EVERY 6 HOURS
Status: DISCONTINUED | OUTPATIENT
Start: 2017-10-28 | End: 2017-10-29

## 2017-10-28 RX ORDER — AMLODIPINE BESYLATE 5 MG/1
5 TABLET ORAL ONCE
Status: COMPLETED | OUTPATIENT
Start: 2017-10-28 | End: 2017-10-28

## 2017-10-28 RX ORDER — AMLODIPINE BESYLATE 5 MG/1
5 TABLET ORAL DAILY
Status: DISCONTINUED | OUTPATIENT
Start: 2017-10-28 | End: 2017-10-28

## 2017-10-28 RX ADMIN — FUROSEMIDE 20 MG: 10 INJECTION, SOLUTION INTRAVENOUS at 01:50

## 2017-10-28 RX ADMIN — CARVEDILOL 12.5 MG: 12.5 TABLET, FILM COATED ORAL at 19:54

## 2017-10-28 RX ADMIN — CARVEDILOL 12.5 MG: 12.5 TABLET, FILM COATED ORAL at 08:55

## 2017-10-28 RX ADMIN — LEVETIRACETAM 500 MG: 500 TABLET, FILM COATED ORAL at 22:10

## 2017-10-28 RX ADMIN — ASPIRIN 81 MG: 81 TABLET, COATED ORAL at 08:55

## 2017-10-28 RX ADMIN — LISINOPRIL 10 MG: 10 TABLET ORAL at 08:55

## 2017-10-28 RX ADMIN — LEVETIRACETAM 500 MG: 500 TABLET, FILM COATED ORAL at 08:55

## 2017-10-28 RX ADMIN — Medication 25 MG: at 22:10

## 2017-10-28 RX ADMIN — AMLODIPINE BESYLATE 5 MG: 5 TABLET ORAL at 15:55

## 2017-10-28 RX ADMIN — HYDRALAZINE HYDROCHLORIDE 25 MG: 25 TABLET ORAL at 17:44

## 2017-10-28 RX ADMIN — AMLODIPINE BESYLATE 5 MG: 5 TABLET ORAL at 17:44

## 2017-10-28 NOTE — PLAN OF CARE
Problem: Hypertensive Disease/Crisis (Arterial) (Adult)  Goal: Signs and Symptoms of Listed Potential Problems Will be Absent, Minimized or Managed (Hypertensive Disease/Crisis)  Signs and symptoms of listed potential problems will be absent, minimized or managed by discharge/transition of care (reference Hypertensive Disease/Crisis (Arterial) (Adult) CPG).   Outcome: No Change  Patient had uneventful night and appeared to sleep well in between cares.  Denies pain or SOB.  Using own CPAP for nights.  Wife stayed the night and helped with patient voiding and saving urine.  Urine collection continues until 0915.  BP's increased as night progressed and NTG drip increased slightly as the goal is to get the NTG drip weaned off.  The PRN BP meds did not meet parameters.
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
## 2017-10-28 NOTE — PLAN OF CARE
Problem: Hypertensive Disease/Crisis (Arterial) (Adult)  Goal: Signs and Symptoms of Listed Potential Problems Will be Absent, Minimized or Managed (Hypertensive Disease/Crisis)  Signs and symptoms of listed potential problems will be absent, minimized or managed by discharge/transition of care (reference Hypertensive Disease/Crisis (Arterial) (Adult) CPG).   Outcome: Improving  Nitroglycerin stopped, BP hovering around 140's/90's, pt denies SOB, lungs clear, ambulating in hallway.

## 2017-10-28 NOTE — PROGRESS NOTES
[facility]    Cardiology Progress Note     Assessment & Plan   Aubrey Freeman is a 67 year old male who was admitted on 10/26/2017.     Summary:   1) HTN crisis  2) Type 2 MI in setting of hypertensive urgency  3) H/o chest pain for 2 weeks prior to presentation with resting regionals possible underlying CAD  4) JESSE in setting of lisinopril initiation and diuresis    Hold lisinopril with JESSE and likely difficulty with follow up. Will start amlodipine 5mg daily instead    Patient is a poor historian but states that his chest pain was present for a few weeks even before presentation with acute episode of pain while hypertensive. May have significant underlying CAD with low normal EF and regionals.    Offered stress test on Monday versus as outpatient with follow up. Patient would prefer to stay until Monday for stress test. Will schedule stress nuclear on Monda given diifcult echo images.    Wyatt Lee MD    Interval History   No complaints overnight. Cr increased to 1.3 with JESSE. BP well controlled today    Physical Exam   Temp: 97.6  F (36.4  C) Temp src: Oral BP: 142/90   Heart Rate: 66 Resp: 18 SpO2: 98 % O2 Device: None (Room air)    Vitals:    10/26/17 2240   Weight: 86.2 kg (190 lb)     Vital Signs with Ranges  Temp:  [97.4  F (36.3  C)-97.7  F (36.5  C)] 97.6  F (36.4  C)  Heart Rate:  [57-73] 66  Resp:  [12-41] 18  BP: (119-166)/() 142/90  SpO2:  [97 %-99 %] 98 %  I/O last 3 completed shifts:  In: 508 [P.O.:480; I.V.:28]  Out: 1250 [Urine:1250]    Patient Active Problem List   Diagnosis     Hyperlipidemia LDL goal <100     Hypertension goal BP (blood pressure) < 130/80     Morbid obesity (H)     PAD (peripheral artery disease) (H)     Hypertensive emergency     Constitutional: A and Ox3   Eyes: normal  ENT: No JVD  Respiratory: CTAB  Cardiovascular: RRR, no m/r/g  GI: Soft, non tender  Lymph/Hematologic: Normal  Genitourinary: not examined  Skin: normal  Musculoskeletal: No  edema  Neurologic: No focal deficit  Neuropsychiatric: Normal    Medications     nitroGLYcerin Stopped (10/28/17 1130)       amLODIPine  5 mg Oral Daily     aspirin EC  81 mg Oral Daily     carvedilol  12.5 mg Oral BID w/meals     traZODone  25 mg Oral At Bedtime     levETIRAcetam  500 mg Oral BID     sodium chloride (PF)  3 mL Intracatheter Q8H     influenza Vac Split High-Dose  0.5 mL Intramuscular Prior to discharge       Data   Results for orders placed or performed during the hospital encounter of 10/26/17 (from the past 24 hour(s))   Basic metabolic panel   Result Value Ref Range    Sodium 134 133 - 144 mmol/L    Potassium 3.8 3.4 - 5.3 mmol/L    Chloride 102 94 - 109 mmol/L    Carbon Dioxide 24 20 - 32 mmol/L    Anion Gap 8 3 - 14 mmol/L    Glucose 119 (H) 70 - 99 mg/dL    Urea Nitrogen 23 7 - 30 mg/dL    Creatinine 1.35 (H) 0.66 - 1.25 mg/dL    GFR Estimate 53 (L) >60 mL/min/1.7m2    GFR Estimate If Black 64 >60 mL/min/1.7m2    Calcium 8.3 (L) 8.5 - 10.1 mg/dL   CBC with platelets differential   Result Value Ref Range    WBC 12.5 (H) 4.0 - 11.0 10e9/L    RBC Count 4.06 (L) 4.4 - 5.9 10e12/L    Hemoglobin 12.4 (L) 13.3 - 17.7 g/dL    Hematocrit 35.0 (L) 40.0 - 53.0 %    MCV 86 78 - 100 fl    MCH 30.5 26.5 - 33.0 pg    MCHC 35.4 31.5 - 36.5 g/dL    RDW 13.0 10.0 - 15.0 %    Platelet Count 160 150 - 450 10e9/L    Diff Method Automated Method     % Neutrophils 82.3 %    % Lymphocytes 6.0 %    % Monocytes 8.9 %    % Eosinophils 2.4 %    % Basophils 0.2 %    % Immature Granulocytes 0.2 %    Nucleated RBCs 0 0 /100    Absolute Neutrophil 10.3 (H) 1.6 - 8.3 10e9/L    Absolute Lymphocytes 0.8 0.8 - 5.3 10e9/L    Absolute Monocytes 1.1 0.0 - 1.3 10e9/L    Absolute Eosinophils 0.3 0.0 - 0.7 10e9/L    Absolute Basophils 0.0 0.0 - 0.2 10e9/L    Abs Immature Granulocytes 0.0 0 - 0.4 10e9/L    Absolute Nucleated RBC 0.0    Lactic acid level STAT   Result Value Ref Range    Lactic Acid 0.7 0.7 - 2.0 mmol/L

## 2017-10-28 NOTE — PROVIDER NOTIFICATION
Notified: Dr Yin    10/28/17 5:14 PM    Notification Interaction: Telephone    Purpose of Notification: Hypertension after taken off IV Nitro. Does not meet parameters for current PRN medication.     Orders Received: MD will order IV Hydralazine with parameters that fit pt's current BP. Order to increase Norvasc dose.

## 2017-10-28 NOTE — PLAN OF CARE
Problem: Patient Care Overview  Goal: Plan of Care/Patient Progress Review  VSS. Tele SB/SR. SaO2 98 % on RA. Nitro gtt weaned down, now at 0.07 mcg/kg/min, pt tolerating well. Continue 24 hour urine test to end at 0915 tomorrow. Spouse at bedside entire shift. Continue to monitor.

## 2017-10-28 NOTE — PROGRESS NOTES
[facility]    Hospitalist Progress Note    Assessment & Plan   Aubrey Freeman is a 67 year old male who was admitted on 10/26/2017, still on IV nitro for BP control    Impression:   Principal Problem:    Hypertensive emergency  Active Problems:    Hyperlipidemia LDL goal <100    PAD (peripheral artery disease) (H)    Pulmonary edema      Plan:  Wean off IV Nitro, possible coronary angiogram ?Monday per cardiology.  Discussed with wife.      DVT Prophylaxis: Pneumatic Compression Devices  Code Status: Full Code    Disposition: Expected discharge in 3 days once cardiac workup complete and BP controlled.    Jered Garcia MD  Pager [phone]  Cell Phone [phone]  Text Page (7am to 6pm)    Interval History   Feels OK, no specific complaints.  Breathing better today.      Physical Exam   Temp: 97.8  F (36.6  C) Temp src: Oral BP: 142/90   Heart Rate: 66 Resp: 18 SpO2: 98 % O2 Device: None (Room air)    Vitals:    10/26/17 2240   Weight: 86.2 kg (190 lb)     Vital Signs with Ranges  Temp:  [97.4  F (36.3  C)-97.8  F (36.6  C)] 97.8  F (36.6  C)  Heart Rate:  [57-73] 66  Resp:  [12-41] 18  BP: (119-166)/() 142/90  SpO2:  [97 %-99 %] 98 %  I/O last 3 completed shifts:  In: 508 [P.O.:480; I.V.:28]  Out: 1000 [Urine:1000]    Constitutional: Awake, alert, cooperative, no apparent distress  Respiratory: Clear to auscultation bilaterally, no crackles or wheezing  Cardiovascular: Regular rate and rhythm, normal S1 and S2, and no murmur noted  GI: Normal bowel sounds, soft, non-distended, non-tender  Extrem: No calf tenderness, no ankle edema  Neuro: Ox3, no focal motor or sensory deficits    Medications     nitroGLYcerin Stopped (10/28/17 1130)       amLODIPine  5 mg Oral Daily     aspirin EC  81 mg Oral Daily     carvedilol  12.5 mg Oral BID w/meals     traZODone  25 mg Oral At Bedtime     levETIRAcetam  500 mg Oral BID     sodium chloride (PF)  3 mL Intracatheter Q8H     influenza Vac  Split High-Dose  0.5 mL Intramuscular Prior to discharge       Data     Recent Labs  Lab 10/28/17  0550 10/27/17  0330 10/26/17  2252 10/26/17  1942   WBC 12.5* 10.1  --  12.9*   HGB 12.4* 12.5*  --  14.4   MCV 86 86  --  86    152  --  154    136  --  135   POTASSIUM 3.8 3.8  --  3.7   CHLORIDE 102 105  --  102   CO2 24 23  --  23   BUN 23 16  --  16   CR 1.35* 0.96  --  0.94   ANIONGAP 8 8  --  10   ANGEL 8.3* 8.5  --  9.0   * 139*  --  118*   ALBUMIN  --   --   --  3.3*   PROTTOTAL  --   --   --  7.4   BILITOTAL  --   --   --  0.8   ALKPHOS  --   --   --  64   ALT  --   --   --  26   AST  --   --   --  14   TROPI  --  0.023 0.046* 0.033       Imaging:   No results found for this or any previous visit (from the past 24 hour(s)).

## 2017-10-29 PROBLEM — S06.9XAA TBI (TRAUMATIC BRAIN INJURY) (H): Status: ACTIVE | Noted: 2017-10-29

## 2017-10-29 LAB
ANION GAP SERPL CALCULATED.3IONS-SCNC: 6 MMOL/L (ref 3–14)
BUN SERPL-MCNC: 28 MG/DL (ref 7–30)
CALCIUM SERPL-MCNC: 8.8 MG/DL (ref 8.5–10.1)
CHLORIDE SERPL-SCNC: 98 MMOL/L (ref 94–109)
CO2 SERPL-SCNC: 28 MMOL/L (ref 20–32)
CREAT SERPL-MCNC: 1.33 MG/DL (ref 0.66–1.25)
GFR SERPL CREATININE-BSD FRML MDRD: 54 ML/MIN/1.7M2
GLUCOSE SERPL-MCNC: 109 MG/DL (ref 70–99)
POTASSIUM SERPL-SCNC: 4.2 MMOL/L (ref 3.4–5.3)
SODIUM SERPL-SCNC: 132 MMOL/L (ref 133–144)

## 2017-10-29 PROCEDURE — 99233 SBSQ HOSP IP/OBS HIGH 50: CPT | Performed by: INTERNAL MEDICINE

## 2017-10-29 PROCEDURE — 25000132 ZZH RX MED GY IP 250 OP 250 PS 637: Performed by: INTERNAL MEDICINE

## 2017-10-29 PROCEDURE — 21000000 ZZH R&B IMCU HEART CARE

## 2017-10-29 PROCEDURE — 80048 BASIC METABOLIC PNL TOTAL CA: CPT | Performed by: INTERNAL MEDICINE

## 2017-10-29 PROCEDURE — 36415 COLL VENOUS BLD VENIPUNCTURE: CPT | Performed by: INTERNAL MEDICINE

## 2017-10-29 RX ORDER — HYDRALAZINE HYDROCHLORIDE 25 MG/1
25 TABLET, FILM COATED ORAL EVERY 6 HOURS PRN
Status: DISCONTINUED | OUTPATIENT
Start: 2017-10-29 | End: 2017-10-31 | Stop reason: HOSPADM

## 2017-10-29 RX ADMIN — ASPIRIN 81 MG: 81 TABLET, COATED ORAL at 09:53

## 2017-10-29 RX ADMIN — CARVEDILOL 18.75 MG: 12.5 TABLET, FILM COATED ORAL at 09:51

## 2017-10-29 RX ADMIN — HYDRALAZINE HYDROCHLORIDE 25 MG: 25 TABLET ORAL at 11:00

## 2017-10-29 RX ADMIN — CARVEDILOL 18.75 MG: 12.5 TABLET, FILM COATED ORAL at 18:27

## 2017-10-29 RX ADMIN — LEVETIRACETAM 500 MG: 500 TABLET, FILM COATED ORAL at 09:53

## 2017-10-29 RX ADMIN — AMLODIPINE BESYLATE 10 MG: 10 TABLET ORAL at 09:53

## 2017-10-29 RX ADMIN — Medication 25 MG: at 21:21

## 2017-10-29 RX ADMIN — LEVETIRACETAM 500 MG: 500 TABLET, FILM COATED ORAL at 21:20

## 2017-10-29 RX ADMIN — HYDRALAZINE HYDROCHLORIDE 25 MG: 25 TABLET ORAL at 06:04

## 2017-10-29 NOTE — PLAN OF CARE
Problem: Patient Care Overview  Goal: Plan of Care/Patient Progress Review  BP elevated with SBP 150s-160s and -110 at times. Pt denies CP, has some FISCHER. LS clear. MD started pt on Norvasc with increased dose after sustained elevated BP. Orders for PO Hydralazine 25mg q6h if SBP > 150 or DBP > 90. Plan for Nuc Stress test Monday. Continue to monitor.

## 2017-10-29 NOTE — PLAN OF CARE
Problem: Patient Care Overview  Goal: Plan of Care/Patient Progress Review  Outcome: Improving  BP improving, pt cont. On hydralazine po, slept well without distress using CPAP, VS stable,  SB on the monitor. Stress test on Monday.
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
## 2017-10-30 ENCOUNTER — APPOINTMENT (OUTPATIENT)
Dept: NUCLEAR MEDICINE | Facility: CLINIC | Age: 67
DRG: 292 | End: 2017-10-30
Attending: INTERNAL MEDICINE
Payer: COMMERCIAL

## 2017-10-30 LAB
ANION GAP SERPL CALCULATED.3IONS-SCNC: 7 MMOL/L (ref 3–14)
BUN SERPL-MCNC: 41 MG/DL (ref 7–30)
CALCIUM SERPL-MCNC: 8.3 MG/DL (ref 8.5–10.1)
CHLORIDE SERPL-SCNC: 100 MMOL/L (ref 94–109)
CO2 SERPL-SCNC: 26 MMOL/L (ref 20–32)
CREAT SERPL-MCNC: 1.53 MG/DL (ref 0.66–1.25)
GFR SERPL CREATININE-BSD FRML MDRD: 46 ML/MIN/1.7M2
GLUCOSE SERPL-MCNC: 100 MG/DL (ref 70–99)
POTASSIUM SERPL-SCNC: 4.1 MMOL/L (ref 3.4–5.3)
SODIUM SERPL-SCNC: 133 MMOL/L (ref 133–144)

## 2017-10-30 PROCEDURE — 93018 CV STRESS TEST I&R ONLY: CPT | Performed by: INTERNAL MEDICINE

## 2017-10-30 PROCEDURE — 27210946 ZZH KIT HC TOTES DISP CR8

## 2017-10-30 PROCEDURE — 78452 HT MUSCLE IMAGE SPECT MULT: CPT | Mod: 26 | Performed by: INTERNAL MEDICINE

## 2017-10-30 PROCEDURE — 36415 COLL VENOUS BLD VENIPUNCTURE: CPT | Performed by: INTERNAL MEDICINE

## 2017-10-30 PROCEDURE — C1769 GUIDE WIRE: HCPCS

## 2017-10-30 PROCEDURE — 27210788 ZZH MANIFOLD CR3

## 2017-10-30 PROCEDURE — 78452 HT MUSCLE IMAGE SPECT MULT: CPT

## 2017-10-30 PROCEDURE — 40000141 ZZH STATISTIC PERIPHERAL IV START W/O US GUIDANCE

## 2017-10-30 PROCEDURE — 25000132 ZZH RX MED GY IP 250 OP 250 PS 637: Performed by: INTERNAL MEDICINE

## 2017-10-30 PROCEDURE — 27210795 ZZH PAD DEFIB QUICK CR4

## 2017-10-30 PROCEDURE — 27211181 ZZH BALLOON TIP PRESSURE CR5

## 2017-10-30 PROCEDURE — A9502 TC99M TETROFOSMIN: HCPCS | Performed by: INTERNAL MEDICINE

## 2017-10-30 PROCEDURE — 99232 SBSQ HOSP IP/OBS MODERATE 35: CPT | Performed by: INTERNAL MEDICINE

## 2017-10-30 PROCEDURE — 25000128 H RX IP 250 OP 636: Performed by: INTERNAL MEDICINE

## 2017-10-30 PROCEDURE — 21000000 ZZH R&B IMCU HEART CARE

## 2017-10-30 PROCEDURE — 27210896 ZZH CATH CR9

## 2017-10-30 PROCEDURE — 27211089 ZZH KIT ACIST INJECTOR CR3

## 2017-10-30 PROCEDURE — 40000855 ZZH STATISTIC ECHO STRESS OR NM NPI

## 2017-10-30 PROCEDURE — 27210787 ZZH MANIFOLD CR2

## 2017-10-30 PROCEDURE — 80048 BASIC METABOLIC PNL TOTAL CA: CPT | Performed by: INTERNAL MEDICINE

## 2017-10-30 PROCEDURE — 93016 CV STRESS TEST SUPVJ ONLY: CPT | Performed by: INTERNAL MEDICINE

## 2017-10-30 PROCEDURE — 34300033 ZZH RX 343: Performed by: INTERNAL MEDICINE

## 2017-10-30 PROCEDURE — 99233 SBSQ HOSP IP/OBS HIGH 50: CPT | Mod: 25 | Performed by: INTERNAL MEDICINE

## 2017-10-30 RX ORDER — ACYCLOVIR 200 MG/1
0-1 CAPSULE ORAL
Status: DISCONTINUED | OUTPATIENT
Start: 2017-10-30 | End: 2017-10-31 | Stop reason: HOSPADM

## 2017-10-30 RX ORDER — REGADENOSON 0.08 MG/ML
0.4 INJECTION, SOLUTION INTRAVENOUS ONCE
Status: COMPLETED | OUTPATIENT
Start: 2017-10-30 | End: 2017-10-30

## 2017-10-30 RX ORDER — CARVEDILOL 25 MG/1
25 TABLET ORAL 2 TIMES DAILY WITH MEALS
Status: DISCONTINUED | OUTPATIENT
Start: 2017-10-30 | End: 2017-10-31

## 2017-10-30 RX ORDER — ALBUTEROL SULFATE 90 UG/1
2 AEROSOL, METERED RESPIRATORY (INHALATION) EVERY 5 MIN PRN
Status: DISCONTINUED | OUTPATIENT
Start: 2017-10-30 | End: 2017-10-31 | Stop reason: HOSPADM

## 2017-10-30 RX ORDER — AMINOPHYLLINE 25 MG/ML
50-100 INJECTION, SOLUTION INTRAVENOUS
Status: DISCONTINUED | OUTPATIENT
Start: 2017-10-30 | End: 2017-10-31 | Stop reason: HOSPADM

## 2017-10-30 RX ADMIN — ASPIRIN 81 MG: 81 TABLET, COATED ORAL at 08:29

## 2017-10-30 RX ADMIN — CARVEDILOL 25 MG: 25 TABLET, FILM COATED ORAL at 17:35

## 2017-10-30 RX ADMIN — AMLODIPINE BESYLATE 10 MG: 10 TABLET ORAL at 08:28

## 2017-10-30 RX ADMIN — Medication 25 MG: at 20:39

## 2017-10-30 RX ADMIN — TETROFOSMIN 10.8 MCI.: 1.38 INJECTION, POWDER, LYOPHILIZED, FOR SOLUTION INTRAVENOUS at 07:45

## 2017-10-30 RX ADMIN — CARVEDILOL 25 MG: 25 TABLET, FILM COATED ORAL at 08:29

## 2017-10-30 RX ADMIN — LEVETIRACETAM 500 MG: 500 TABLET, FILM COATED ORAL at 20:39

## 2017-10-30 RX ADMIN — REGADENOSON 0.4 MG: 0.08 INJECTION, SOLUTION INTRAVENOUS at 10:19

## 2017-10-30 RX ADMIN — LEVETIRACETAM 500 MG: 500 TABLET, FILM COATED ORAL at 08:29

## 2017-10-30 RX ADMIN — TETROFOSMIN 33.2 MCI.: 1.38 INJECTION, POWDER, LYOPHILIZED, FOR SOLUTION INTRAVENOUS at 10:55

## 2017-10-30 RX ADMIN — HYDRALAZINE HYDROCHLORIDE 25 MG: 25 TABLET ORAL at 20:39
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
## 2017-10-30 NOTE — PROGRESS NOTES
Patient very vague on reporting symptoms.  Patient states he has chest pain but then feels sleepy with no chest pain.  VSS.  Patient observed in EKG longer.  Back to room now.

## 2017-10-30 NOTE — PLAN OF CARE
Problem: Patient Care Overview  Goal: Plan of Care/Patient Progress Review  Outcome: No Change  VSS, O2 sats 98% RA. Up SBA in room without difficulty, calls appropriately. Wife stays in room with patient per patients request. A/O x3. Denies pain. IV is SL. No alarms per patient request, call light wtihin reach.

## 2017-10-30 NOTE — PLAN OF CARE
Problem: Patient Care Overview  Goal: Plan of Care/Patient Progress Review  Outcome: Improving  Pt A/o x4, wife stays with pt in room is very supportive and helpful. Hydralazine prn x1 earlier today. BP better this evening. Plan nuc stress tomorrow.
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
## 2017-10-31 VITALS
HEART RATE: 57 BPM | RESPIRATION RATE: 16 BRPM | BODY MASS INDEX: 29.81 KG/M2 | TEMPERATURE: 97.6 F | WEIGHT: 189.9 LBS | DIASTOLIC BLOOD PRESSURE: 94 MMHG | OXYGEN SATURATION: 98 % | SYSTOLIC BLOOD PRESSURE: 149 MMHG | HEIGHT: 67 IN

## 2017-10-31 LAB
ANION GAP SERPL CALCULATED.3IONS-SCNC: 7 MMOL/L (ref 3–14)
BUN SERPL-MCNC: 34 MG/DL (ref 7–30)
CALCIUM SERPL-MCNC: 8.7 MG/DL (ref 8.5–10.1)
CHLORIDE SERPL-SCNC: 105 MMOL/L (ref 94–109)
CO2 SERPL-SCNC: 25 MMOL/L (ref 20–32)
CREAT SERPL-MCNC: 1.24 MG/DL (ref 0.66–1.25)
GFR SERPL CREATININE-BSD FRML MDRD: 58 ML/MIN/1.7M2
GLUCOSE SERPL-MCNC: 94 MG/DL (ref 70–99)
POTASSIUM SERPL-SCNC: 4 MMOL/L (ref 3.4–5.3)
SODIUM SERPL-SCNC: 137 MMOL/L (ref 133–144)

## 2017-10-31 PROCEDURE — 99239 HOSP IP/OBS DSCHRG MGMT >30: CPT | Performed by: INTERNAL MEDICINE

## 2017-10-31 PROCEDURE — 99232 SBSQ HOSP IP/OBS MODERATE 35: CPT | Performed by: INTERNAL MEDICINE

## 2017-10-31 PROCEDURE — 25000132 ZZH RX MED GY IP 250 OP 250 PS 637: Performed by: NURSE PRACTITIONER

## 2017-10-31 PROCEDURE — 80048 BASIC METABOLIC PNL TOTAL CA: CPT | Performed by: INTERNAL MEDICINE

## 2017-10-31 PROCEDURE — 36415 COLL VENOUS BLD VENIPUNCTURE: CPT | Performed by: INTERNAL MEDICINE

## 2017-10-31 PROCEDURE — 40000886 ZZH STATISTIC STEP DOWN HRS DAY

## 2017-10-31 PROCEDURE — 25000132 ZZH RX MED GY IP 250 OP 250 PS 637: Performed by: INTERNAL MEDICINE

## 2017-10-31 RX ORDER — HYDRALAZINE HYDROCHLORIDE 50 MG/1
50 TABLET, FILM COATED ORAL 3 TIMES DAILY
Status: DISCONTINUED | OUTPATIENT
Start: 2017-10-31 | End: 2017-10-31 | Stop reason: HOSPADM

## 2017-10-31 RX ORDER — AMLODIPINE BESYLATE 10 MG/1
10 TABLET ORAL DAILY
Qty: 30 TABLET | Refills: 3 | Status: SHIPPED | OUTPATIENT
Start: 2017-10-31 | End: 2017-10-31

## 2017-10-31 RX ORDER — CARVEDILOL 25 MG/1
37.5 TABLET ORAL 2 TIMES DAILY WITH MEALS
Qty: 60 TABLET | Refills: 3
Start: 2017-10-31 | End: 2017-11-13

## 2017-10-31 RX ORDER — CARVEDILOL 25 MG/1
25 TABLET ORAL 2 TIMES DAILY WITH MEALS
Qty: 60 TABLET | Refills: 3 | Status: SHIPPED | OUTPATIENT
Start: 2017-10-31 | End: 2017-10-31

## 2017-10-31 RX ORDER — HYDRALAZINE HYDROCHLORIDE 50 MG/1
50 TABLET, FILM COATED ORAL 3 TIMES DAILY
Qty: 90 TABLET | Refills: 3 | Status: SHIPPED | OUTPATIENT
Start: 2017-10-31 | End: 2017-12-07

## 2017-10-31 RX ORDER — CARVEDILOL 12.5 MG/1
12.5 TABLET ORAL ONCE
Status: COMPLETED | OUTPATIENT
Start: 2017-10-31 | End: 2017-10-31

## 2017-10-31 RX ADMIN — ASPIRIN 81 MG: 81 TABLET, COATED ORAL at 07:58

## 2017-10-31 RX ADMIN — CARVEDILOL 25 MG: 25 TABLET, FILM COATED ORAL at 07:57

## 2017-10-31 RX ADMIN — HYDRALAZINE HYDROCHLORIDE 50 MG: 50 TABLET ORAL at 07:57

## 2017-10-31 RX ADMIN — CARVEDILOL 12.5 MG: 12.5 TABLET, FILM COATED ORAL at 11:18

## 2017-10-31 RX ADMIN — HYDRALAZINE HYDROCHLORIDE 25 MG: 25 TABLET ORAL at 04:13

## 2017-10-31 RX ADMIN — AMLODIPINE BESYLATE 10 MG: 10 TABLET ORAL at 07:58

## 2017-10-31 RX ADMIN — LEVETIRACETAM 500 MG: 500 TABLET, FILM COATED ORAL at 07:57

## 2017-10-31 NOTE — PLAN OF CARE
Problem: Patient Care Overview  Goal: Individualization & Mutuality  Outcome: Improving  Pt pain free with no c/o of FISCHER or SOB. 's/90's ( see VS flowsheet) pt received coreg as ordered. Plan is to monitor BP over noc and recheck BMP in a.m. If CR is improved and VS stable, pt will likely dc to home tomorrow.
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
## 2017-10-31 NOTE — PLAN OF CARE
"Problem: Patient Care Overview  Goal: Plan of Care/Patient Progress Review  Outcome: No Change  VSS with baseline of BP ranging from  -168, asymptomatic and seems very positional with arm and if patient awake or asleep.A/O x3 , steady ambulating. Wife \"Kristen\" stays with patient in the room. Patient denies pain or problems. No alarms per patient. Call light within reach.      "

## 2017-10-31 NOTE — DISCHARGE INSTRUCTIONS
Nifedipine extended-release tablets  Brand Names: Adalat CC, Afeditab CR, Nifediac CC, Nifedical XL, Procardia XL  What is this medicine?  NIFEDIPINE (judy ambrosio) is a calcium-channel blocker. It affects the amount of calcium found in your heart and muscle cells. This relaxes your blood vessels, which can reduce the amount of work the heart has to do. This medicine is used to treat high blood pressure and chest pain caused by angina.  How should I use this medicine?  Take this medicine by mouth with a glass of water. Follow the directions on the prescription label. Do not cut, crush or chew. Take your doses at regular intervals. Do not take your medicine more often then directed. Do not suddenly stop taking this medicine. Your doctor will tell you how much medicine to take. If your doctor wants you to stop the medicine, the dose will be slowly lowered over time to avoid any side effects.  Talk to your pediatrician regarding the use of this medicine in children. Special care may be needed.  What side effects may I notice from receiving this medicine?  Side effects that you should report to your doctor or health care professional as soon as possible:    blood in the urine    difficulty breathing    fast heartbeat, palpitations, irregular heartbeat, chest pain    redness, blistering, peeling or loosening of the skin, including inside the mouth    reduced amount of urine passed    skin rash    swelling of the legs and ankles  Side effects that usually do not require medical attention (report to your doctor or health care professional if they continue or are bothersome):    constipation    facial flushing    headache    weakness or tiredness  What may interact with this medicine?  Do not take this medicine with any of the following medications:    certain medicines for seizures like carbamazepine, phenobarbital, phenytoin    lumacaftor; ivacaftor    rifabutin    rifampin    rifapentine    [place]'s Wort  This  medicine may also interact with the following medications:    antiviral medicines for HIV or AIDS    certain medicines for blood pressure    certain medicines for diabetes    certain medicines for erectile dysfunction    certain medicines for fungal infections like ketoconazole, fluconazole, and itraconazole    certain medicines for irregular heart beat like flecainide and quinidine    certain medicines that treat or prevent blood clots like warfarin    clarithromycin    digoxin    dolasetron    erythromycin    fluoxetine    grapefruit juice    local or general anesthetics    nefazodone    orlistat    quinupristin; dalfopristin    sirolimus    stomach acid blockers like cimetidine, ranitidine, omeprazole, or pantoprazole    tacrolimus    valproic acid  What if I miss a dose?  If you miss a dose, take it as soon as you can. If it is almost time for your next dose, take only that dose. Do not take double or extra doses.  Where should I keep my medicine?  Keep out of the reach of children.  Store at room temperature below 30 degrees C (86 degrees F). Protect from moisture and humidity. Keep container tightly closed. Throw away any unused medicine after the expiration date.  What should I tell my health care provider before I take this medicine?  They need to know if you have any of these conditions:    heart problems, low blood pressure, slow or irregular heartbeat    kidney disease    liver disease    previous heart attack    stomach or intestine problems    an unusual or allergic reaction to nifedipine, other medicines, foods, dyes, or preservatives    pregnant or trying to get pregnant    breast-feeding  What should I watch for while using this medicine?  Visit your doctor or health care professional for regular check ups. Check your blood pressure and pulse rate regularly. Ask your doctor or health care professional what your blood pressure and pulse rate should be and when you should contact him or her.  You may  get drowsy or dizzy. Do not drive, use machinery, or do anything that needs mental alertness until you know how this medicine affects you. Do not stand or sit up quickly, especially if you are an older patient. This reduces the risk of dizzy or fainting spells. Alcohol may interfere with the effect of this medicine. Avoid alcoholic drinks.  If you are taking Procardia XL, you may notice the empty shell of the tablet in your stool.  NOTE:This sheet is a summary. It may not cover all possible information. If you have questions about this medicine, talk to your doctor, pharmacist, or health care provider. Copyright  2017 [employer]
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
## 2017-11-01 ENCOUNTER — CARE COORDINATION (OUTPATIENT)
Dept: CARDIOLOGY | Facility: CLINIC | Age: 67
End: 2017-11-01

## 2017-11-01 NOTE — PROGRESS NOTES
Called patient and left  to discuss any post hospital d/c questions he may have, review medication changes, and confirm f/u appts. Patient was seen by cardiology for type 2MI secondary to HTN crisis. RN advised patient in  that he has an apt scheduled on 11/10/17 with [facility] Leigh Espinal (10am lab and 11am with [facility]). Patient advised in  to call clinic with any cardiac related questions or concerns prior to his apt on 11/10/17.

## 2017-11-01 NOTE — PROGRESS NOTES
Late entry: Nursing:  Discharge education completed with patient and spouse yesterday 10/31 at 1430. Pt and spouse verbalized understanding of all instruction. Pt received written copy of all instruction. VSS at time of discharge and pt had no c/o pain at time of dc.  Pt received ordered discharge meds from pharmacy. All personal belongings with patient. Wife present to transport to home.

## 2017-11-07 ENCOUNTER — HOSPITAL ENCOUNTER (INPATIENT)
Facility: CLINIC | Age: 67
LOS: 3 days | Discharge: HOME | DRG: 291 | End: 2017-11-10
Attending: EMERGENCY MEDICINE | Admitting: INTERNAL MEDICINE
Payer: COMMERCIAL

## 2017-11-07 ENCOUNTER — APPOINTMENT (OUTPATIENT)
Dept: GENERAL RADIOLOGY | Facility: CLINIC | Age: 67
DRG: 291 | End: 2017-11-07
Attending: EMERGENCY MEDICINE
Payer: COMMERCIAL

## 2017-11-07 DIAGNOSIS — R06.02 SOB (SHORTNESS OF BREATH): ICD-10-CM

## 2017-11-07 DIAGNOSIS — I50.31 ACUTE DIASTOLIC HEART FAILURE (H): ICD-10-CM

## 2017-11-07 DIAGNOSIS — R07.9 ACUTE CHEST PAIN: ICD-10-CM

## 2017-11-07 DIAGNOSIS — E87.1 HYPONATREMIA: ICD-10-CM

## 2017-11-07 DIAGNOSIS — I10 HYPERTENSION GOAL BP (BLOOD PRESSURE) < 130/80: ICD-10-CM

## 2017-11-07 DIAGNOSIS — J81.0 ACUTE PULMONARY EDEMA (H): ICD-10-CM

## 2017-11-07 PROBLEM — I50.9 ACUTE EXACERBATION OF CHF (CONGESTIVE HEART FAILURE) (H): Status: ACTIVE | Noted: 2017-11-07

## 2017-11-07 LAB
ANION GAP SERPL CALCULATED.3IONS-SCNC: 9 MMOL/L (ref 3–14)
BASE DEFICIT BLDV-SCNC: 4.2 MMOL/L
BASOPHILS # BLD AUTO: 0 10E9/L (ref 0–0.2)
BASOPHILS NFR BLD AUTO: 0.2 %
BUN SERPL-MCNC: 30 MG/DL (ref 7–30)
CALCIUM SERPL-MCNC: 8.7 MG/DL (ref 8.5–10.1)
CHLORIDE SERPL-SCNC: 97 MMOL/L (ref 94–109)
CO2 SERPL-SCNC: 20 MMOL/L (ref 20–32)
CREAT SERPL-MCNC: 1 MG/DL (ref 0.66–1.25)
DIFFERENTIAL METHOD BLD: ABNORMAL
EOSINOPHIL # BLD AUTO: 0.1 10E9/L (ref 0–0.7)
EOSINOPHIL NFR BLD AUTO: 0.6 %
ERYTHROCYTE [DISTWIDTH] IN BLOOD BY AUTOMATED COUNT: 12.7 % (ref 10–15)
GFR SERPL CREATININE-BSD FRML MDRD: 75 ML/MIN/1.7M2
GLUCOSE BLDC GLUCOMTR-MCNC: 148 MG/DL (ref 70–99)
GLUCOSE SERPL-MCNC: 167 MG/DL (ref 70–99)
HCO3 BLDV-SCNC: 20 MMOL/L (ref 21–28)
HCT VFR BLD AUTO: 36.8 % (ref 40–53)
HGB BLD-MCNC: 13.3 G/DL (ref 13.3–17.7)
IMM GRANULOCYTES # BLD: 0.1 10E9/L (ref 0–0.4)
IMM GRANULOCYTES NFR BLD: 0.7 %
LACTATE BLD-SCNC: 0.8 MMOL/L (ref 0.7–2)
LYMPHOCYTES # BLD AUTO: 1.2 10E9/L (ref 0.8–5.3)
LYMPHOCYTES NFR BLD AUTO: 6.8 %
MCH RBC QN AUTO: 30.5 PG (ref 26.5–33)
MCHC RBC AUTO-ENTMCNC: 36.1 G/DL (ref 31.5–36.5)
MCV RBC AUTO: 84 FL (ref 78–100)
MONOCYTES # BLD AUTO: 1.1 10E9/L (ref 0–1.3)
MONOCYTES NFR BLD AUTO: 6.2 %
NEUTROPHILS # BLD AUTO: 14.5 10E9/L (ref 1.6–8.3)
NEUTROPHILS NFR BLD AUTO: 85.5 %
NRBC # BLD AUTO: 0 10*3/UL
NRBC BLD AUTO-RTO: 0 /100
NT-PROBNP SERPL-MCNC: 2391 PG/ML (ref 0–900)
OXYHGB MFR BLDV: 94 %
PCO2 BLDV: 33 MM HG (ref 40–50)
PH BLDV: 7.39 PH (ref 7.32–7.43)
PLATELET # BLD AUTO: 255 10E9/L (ref 150–450)
PO2 BLDV: 70 MM HG (ref 25–47)
POTASSIUM SERPL-SCNC: 4.7 MMOL/L (ref 3.4–5.3)
PROCALCITONIN SERPL-MCNC: 0.08 NG/ML
RBC # BLD AUTO: 4.36 10E12/L (ref 4.4–5.9)
SODIUM SERPL-SCNC: 126 MMOL/L (ref 133–144)
TROPONIN I SERPL-MCNC: <0.015 UG/L (ref 0–0.04)
TROPONIN I SERPL-MCNC: <0.015 UG/L (ref 0–0.04)
WBC # BLD AUTO: 16.9 10E9/L (ref 4–11)

## 2017-11-07 PROCEDURE — 25000128 H RX IP 250 OP 636

## 2017-11-07 PROCEDURE — 99222 1ST HOSP IP/OBS MODERATE 55: CPT | Performed by: INTERNAL MEDICINE

## 2017-11-07 PROCEDURE — 83880 ASSAY OF NATRIURETIC PEPTIDE: CPT | Performed by: EMERGENCY MEDICINE

## 2017-11-07 PROCEDURE — 84145 PROCALCITONIN (PCT): CPT | Performed by: EMERGENCY MEDICINE

## 2017-11-07 PROCEDURE — 80048 BASIC METABOLIC PNL TOTAL CA: CPT | Performed by: EMERGENCY MEDICINE

## 2017-11-07 PROCEDURE — 36415 COLL VENOUS BLD VENIPUNCTURE: CPT | Performed by: INTERNAL MEDICINE

## 2017-11-07 PROCEDURE — 25000132 ZZH RX MED GY IP 250 OP 250 PS 637: Performed by: INTERNAL MEDICINE

## 2017-11-07 PROCEDURE — 99285 EMERGENCY DEPT VISIT HI MDM: CPT | Mod: 25

## 2017-11-07 PROCEDURE — 94660 CPAP INITIATION&MGMT: CPT

## 2017-11-07 PROCEDURE — 25000128 H RX IP 250 OP 636: Performed by: INTERNAL MEDICINE

## 2017-11-07 PROCEDURE — 71010 XR CHEST PORT 1 VW: CPT

## 2017-11-07 PROCEDURE — 93005 ELECTROCARDIOGRAM TRACING: CPT

## 2017-11-07 PROCEDURE — 99291 CRITICAL CARE FIRST HOUR: CPT | Performed by: INTERNAL MEDICINE

## 2017-11-07 PROCEDURE — 96374 THER/PROPH/DIAG INJ IV PUSH: CPT

## 2017-11-07 PROCEDURE — 83605 ASSAY OF LACTIC ACID: CPT | Performed by: EMERGENCY MEDICINE

## 2017-11-07 PROCEDURE — 85025 COMPLETE CBC W/AUTO DIFF WBC: CPT | Performed by: EMERGENCY MEDICINE

## 2017-11-07 PROCEDURE — 82805 BLOOD GASES W/O2 SATURATION: CPT | Performed by: EMERGENCY MEDICINE

## 2017-11-07 PROCEDURE — 20000003 ZZH R&B ICU

## 2017-11-07 PROCEDURE — 40000275 ZZH STATISTIC RCP TIME EA 10 MIN

## 2017-11-07 PROCEDURE — 84484 ASSAY OF TROPONIN QUANT: CPT | Performed by: INTERNAL MEDICINE

## 2017-11-07 PROCEDURE — 27210300 ZZH CANNULA HIGH FLOW, ADULT

## 2017-11-07 PROCEDURE — 84484 ASSAY OF TROPONIN QUANT: CPT | Performed by: EMERGENCY MEDICINE

## 2017-11-07 PROCEDURE — 00000146 ZZHCL STATISTIC GLUCOSE BY METER IP

## 2017-11-07 PROCEDURE — 99207 ZZC APP CREDIT; MD BILLING SHARED VISIT: CPT | Performed by: INTERNAL MEDICINE

## 2017-11-07 RX ORDER — ONDANSETRON 2 MG/ML
4 INJECTION INTRAMUSCULAR; INTRAVENOUS EVERY 6 HOURS PRN
Status: DISCONTINUED | OUTPATIENT
Start: 2017-11-07 | End: 2017-11-10 | Stop reason: HOSPADM

## 2017-11-07 RX ORDER — ACETAMINOPHEN 325 MG/1
650 TABLET ORAL EVERY 4 HOURS PRN
Status: DISCONTINUED | OUTPATIENT
Start: 2017-11-07 | End: 2017-11-10 | Stop reason: HOSPADM

## 2017-11-07 RX ORDER — NITROGLYCERIN 20 MG/100ML
INJECTION INTRAVENOUS
Status: COMPLETED
Start: 2017-11-07 | End: 2017-11-07

## 2017-11-07 RX ORDER — ONDANSETRON 4 MG/1
4 TABLET, ORALLY DISINTEGRATING ORAL EVERY 6 HOURS PRN
Status: DISCONTINUED | OUTPATIENT
Start: 2017-11-07 | End: 2017-11-10 | Stop reason: HOSPADM

## 2017-11-07 RX ORDER — POTASSIUM CHLORIDE 1500 MG/1
20-40 TABLET, EXTENDED RELEASE ORAL
Status: DISCONTINUED | OUTPATIENT
Start: 2017-11-07 | End: 2017-11-10 | Stop reason: HOSPADM

## 2017-11-07 RX ORDER — FUROSEMIDE 10 MG/ML
40 INJECTION INTRAMUSCULAR; INTRAVENOUS ONCE
Status: COMPLETED | OUTPATIENT
Start: 2017-11-07 | End: 2017-11-07

## 2017-11-07 RX ORDER — HYDROCODONE BITARTRATE AND ACETAMINOPHEN 5; 325 MG/1; MG/1
1-2 TABLET ORAL EVERY 4 HOURS PRN
Status: DISCONTINUED | OUTPATIENT
Start: 2017-11-07 | End: 2017-11-10 | Stop reason: HOSPADM

## 2017-11-07 RX ORDER — POTASSIUM CHLORIDE 1.5 G/1.58G
20-40 POWDER, FOR SOLUTION ORAL
Status: DISCONTINUED | OUTPATIENT
Start: 2017-11-07 | End: 2017-11-10 | Stop reason: HOSPADM

## 2017-11-07 RX ORDER — LISINOPRIL 5 MG/1
5 TABLET ORAL DAILY
Status: DISCONTINUED | OUTPATIENT
Start: 2017-11-07 | End: 2017-11-08

## 2017-11-07 RX ORDER — POLYETHYLENE GLYCOL 3350 17 G/17G
17 POWDER, FOR SOLUTION ORAL DAILY PRN
Status: DISCONTINUED | OUTPATIENT
Start: 2017-11-07 | End: 2017-11-10 | Stop reason: HOSPADM

## 2017-11-07 RX ORDER — POTASSIUM CHLORIDE 7.45 MG/ML
10 INJECTION INTRAVENOUS
Status: DISCONTINUED | OUTPATIENT
Start: 2017-11-07 | End: 2017-11-10 | Stop reason: HOSPADM

## 2017-11-07 RX ORDER — AMOXICILLIN 250 MG
1 CAPSULE ORAL 2 TIMES DAILY PRN
Status: DISCONTINUED | OUTPATIENT
Start: 2017-11-07 | End: 2017-11-10 | Stop reason: HOSPADM

## 2017-11-07 RX ORDER — LEVETIRACETAM 500 MG/1
500 TABLET ORAL 2 TIMES DAILY
Status: DISCONTINUED | OUTPATIENT
Start: 2017-11-07 | End: 2017-11-10 | Stop reason: HOSPADM

## 2017-11-07 RX ORDER — FUROSEMIDE 10 MG/ML
INJECTION INTRAMUSCULAR; INTRAVENOUS
Status: COMPLETED
Start: 2017-11-07 | End: 2017-11-07

## 2017-11-07 RX ORDER — ASPIRIN 81 MG/1
81 TABLET ORAL DAILY
Status: DISCONTINUED | OUTPATIENT
Start: 2017-11-07 | End: 2017-11-10 | Stop reason: HOSPADM

## 2017-11-07 RX ORDER — POTASSIUM CHLORIDE 29.8 MG/ML
20 INJECTION INTRAVENOUS
Status: DISCONTINUED | OUTPATIENT
Start: 2017-11-07 | End: 2017-11-10 | Stop reason: HOSPADM

## 2017-11-07 RX ORDER — NITROGLYCERIN 20 MG/100ML
.07-2 INJECTION INTRAVENOUS CONTINUOUS
Status: DISCONTINUED | OUTPATIENT
Start: 2017-11-07 | End: 2017-11-07

## 2017-11-07 RX ORDER — FUROSEMIDE 10 MG/ML
20 INJECTION INTRAMUSCULAR; INTRAVENOUS 3 TIMES DAILY
Status: DISCONTINUED | OUTPATIENT
Start: 2017-11-07 | End: 2017-11-08

## 2017-11-07 RX ORDER — POTASSIUM CL/LIDO/0.9 % NACL 10MEQ/0.1L
10 INTRAVENOUS SOLUTION, PIGGYBACK (ML) INTRAVENOUS
Status: DISCONTINUED | OUTPATIENT
Start: 2017-11-07 | End: 2017-11-10 | Stop reason: HOSPADM

## 2017-11-07 RX ORDER — AMLODIPINE BESYLATE 5 MG/1
5 TABLET ORAL DAILY
Status: DISCONTINUED | OUTPATIENT
Start: 2017-11-07 | End: 2017-11-10

## 2017-11-07 RX ORDER — LATANOPROST 50 UG/ML
1 SOLUTION/ DROPS OPHTHALMIC AT BEDTIME
Status: DISCONTINUED | OUTPATIENT
Start: 2017-11-07 | End: 2017-11-10 | Stop reason: HOSPADM

## 2017-11-07 RX ORDER — NALOXONE HYDROCHLORIDE 0.4 MG/ML
.1-.4 INJECTION, SOLUTION INTRAMUSCULAR; INTRAVENOUS; SUBCUTANEOUS
Status: DISCONTINUED | OUTPATIENT
Start: 2017-11-07 | End: 2017-11-10 | Stop reason: HOSPADM

## 2017-11-07 RX ORDER — MAGNESIUM CARB/ALUMINUM HYDROX 105-160MG
148 TABLET,CHEWABLE ORAL
Status: DISCONTINUED | OUTPATIENT
Start: 2017-11-07 | End: 2017-11-10 | Stop reason: HOSPADM

## 2017-11-07 RX ORDER — AMOXICILLIN 250 MG
2 CAPSULE ORAL 2 TIMES DAILY PRN
Status: DISCONTINUED | OUTPATIENT
Start: 2017-11-07 | End: 2017-11-10 | Stop reason: HOSPADM

## 2017-11-07 RX ORDER — HYDRALAZINE HYDROCHLORIDE 50 MG/1
50 TABLET, FILM COATED ORAL 3 TIMES DAILY
Status: DISCONTINUED | OUTPATIENT
Start: 2017-11-07 | End: 2017-11-10 | Stop reason: HOSPADM

## 2017-11-07 RX ORDER — HYDRALAZINE HYDROCHLORIDE 20 MG/ML
10 INJECTION INTRAMUSCULAR; INTRAVENOUS EVERY 4 HOURS PRN
Status: DISCONTINUED | OUTPATIENT
Start: 2017-11-07 | End: 2017-11-10 | Stop reason: HOSPADM

## 2017-11-07 RX ORDER — NITROGLYCERIN 20 MG/100ML
.07-2 INJECTION INTRAVENOUS CONTINUOUS
Status: DISCONTINUED | OUTPATIENT
Start: 2017-11-07 | End: 2017-11-08

## 2017-11-07 RX ADMIN — FUROSEMIDE 20 MG: 10 INJECTION, SOLUTION INTRAVENOUS at 15:35

## 2017-11-07 RX ADMIN — NITROGLYCERIN 0.07 MCG/KG/MIN: 20 INJECTION INTRAVENOUS at 05:26

## 2017-11-07 RX ADMIN — LEVETIRACETAM 500 MG: 500 TABLET, FILM COATED ORAL at 21:48

## 2017-11-07 RX ADMIN — HYDRALAZINE HYDROCHLORIDE 50 MG: 50 TABLET ORAL at 21:47

## 2017-11-07 RX ADMIN — FUROSEMIDE 20 MG: 10 INJECTION, SOLUTION INTRAVENOUS at 09:20

## 2017-11-07 RX ADMIN — CARVEDILOL 37.5 MG: 25 TABLET, FILM COATED ORAL at 09:20

## 2017-11-07 RX ADMIN — CARVEDILOL 37.5 MG: 25 TABLET, FILM COATED ORAL at 17:17

## 2017-11-07 RX ADMIN — FUROSEMIDE 40 MG: 10 INJECTION INTRAMUSCULAR; INTRAVENOUS at 03:46

## 2017-11-07 RX ADMIN — HYDRALAZINE HYDROCHLORIDE 50 MG: 50 TABLET ORAL at 15:35

## 2017-11-07 RX ADMIN — AMLODIPINE BESYLATE 5 MG: 5 TABLET ORAL at 09:20

## 2017-11-07 RX ADMIN — LISINOPRIL 5 MG: 5 TABLET ORAL at 09:20

## 2017-11-07 RX ADMIN — ASPIRIN 81 MG: 81 TABLET, COATED ORAL at 09:20

## 2017-11-07 RX ADMIN — NITROGLYCERIN 0.07 MCG/KG/MIN: 20 INJECTION INTRAVENOUS at 03:16

## 2017-11-07 RX ADMIN — Medication 25 MG: at 21:47

## 2017-11-07 RX ADMIN — LATANOPROST 1 DROP: 50 SOLUTION OPHTHALMIC at 21:48

## 2017-11-07 RX ADMIN — LEVETIRACETAM 500 MG: 500 TABLET, FILM COATED ORAL at 09:20

## 2017-11-07 RX ADMIN — FUROSEMIDE 20 MG: 10 INJECTION, SOLUTION INTRAVENOUS at 21:47

## 2017-11-07 RX ADMIN — HYDRALAZINE HYDROCHLORIDE 50 MG: 50 TABLET ORAL at 09:20

## 2017-11-07 RX ADMIN — FUROSEMIDE 40 MG: 10 INJECTION, SOLUTION INTRAVENOUS at 03:46

## 2017-11-07 ASSESSMENT — ENCOUNTER SYMPTOMS
UNEXPECTED WEIGHT CHANGE: 1
VOMITING: 0
COUGH: 0
SHORTNESS OF BREATH: 1
NAUSEA: 0
DIARRHEA: 0
FEVER: 0

## 2017-11-07 NOTE — IP AVS SNAPSHOT
MRN:[id number]                      After Visit Summary   11/7/2017    Aubrey Freeman    MRN: [id number]           Thank you!     Thank you for choosing [facility] for your care. Our goal is always to provide you with excellent care. Hearing back from our patients is one way we can continue to improve our services. Please take a few minutes to complete the written survey that you may receive in the mail after you visit with us. Thank you!        Patient Information     Date Of Birth          1950        Designated Caregiver       Most Recent Value    Caregiver    Will someone help with your care after discharge? yes    Name of designated caregiver wife    Phone number of caregiver same as pt    Caregiver address same at pt      About your hospital stay     You were admitted on:  November 7, 2017 You last received care in the:  [facility]    You were discharged on:  November 10, 2017       Who to Call     For medical emergencies, please call 911.  For non-urgent questions about your medical care, please call your primary care provider or clinic, [phone]          Attending Provider     Provider Specialty    Suni Lee MD Emergency Medicine    [place], Joel Walters MD Internal Medicine       Primary Care Provider Office Phone # Fax #    Bethany Wiggins [facility] [phone]      After Care Instructions     Activity       Your activity upon discharge: activity as tolerated            Diet       Follow this diet upon discharge: Orders Placed This Encounter      Regular Diet Adult                  Follow-up Appointments     Follow-up and recommended labs and tests        Follow up with primary care provider, Bethany Cohen, within 7 days for hospital follow- up.  The following labs/tests are recommended: BMP.                  Your next 10 appointments already scheduled     Nov 13, 2017  2:10 PM CST   LAB with [facility]   [facility]  "[facility] [place] [facility]    [address]  [facility] [address]   [phone]           Please do not eat 10-12 hours before your appointment if you are coming in fasting for labs on lipids, cholesterol, or glucose (sugar). This does not apply to pregnant women. Water, hot tea and black coffee (with nothing added) are okay. Do not drink other fluids, diet soda or chew gum.            Nov 13, 2017  3:10 PM CST   CORE Enrollment with Roxanna Kelly PA-C   [facility] ([facility])    [address]  [place] [address]   [phone]            Jan 19, 2018  9:45 AM CST   Return Visit with Dave Miranda MD   [facility] ([facility])    [address]  [place] [address]   [phone]              Pending Results     No orders found from 11/5/2017 to 11/8/2017.            Statement of Approval     Ordered          11/10/17 1115  I have reviewed and agree with all the recommendations and orders detailed in this document.  EFFECTIVE NOW     Approved and electronically signed by:  Mykel Tang DO             Admission Information     Date & Time Provider Department Dept. Phone    11/7/2017 Joel Guerin MD [facility] [phone]      Your Vitals Were     Blood Pressure Pulse Temperature Respirations Height Weight    157/102 (BP Location: Right arm) 61 97.6  F (36.4  C) (Oral) 20 1.702 m (5' 7.01\") 86.1 kg (189 lb 14.4 oz)    Pulse Oximetry BMI (Body Mass Index)                97% 29.74 kg/m2          MyChart Information     [employer] lets you send messages to your doctor, view your test results, renew your prescriptions, schedule appointments and more. To sign up, go to [email or web address] . Click on \"Log in\" on the left side of the screen, which will take you to the Welcome " "page. Then click on \"Sign up Now\" on the right side of the page.     You will be asked to enter the access code listed below, as well as some personal information. Please follow the directions to create your username and password.     Your access code is: [email or web address]  Expires: 2018  6:16 PM     Your access code will  in 90 days. If you need help or a new code, please call your [facility] clinic or [phone].        Care EveryWhere ID     This is your Care EveryWhere ID. This could be used by other organizations to access your [facility] medical records  [id number]        Equal Access to Services     [facility] : Edelmira Tirado, jeanne cardenas, meenakshi stearns, enrico garcia . So [facility] [phone].    ATENCIÓN: Si habla español, tiene a rodriguez disposición servicios gratuitos de asistencia lingüística. Llame al [phone].    We comply with applicable federal civil rights laws and [place] laws. We do not discriminate on the basis of race, color, national origin, age, disability, sex, sexual orientation, or gender identity.               Review of your medicines      START taking        Dose / Directions    amLODIPine 5 MG tablet   Commonly known as:  NORVASC   Used for:  Hypertension goal BP (blood pressure) < 130/80        Dose:  5 mg   Take 1 tablet (5 mg) by mouth 2 times daily   Quantity:  60 tablet   Refills:  1       furosemide 20 MG tablet   Commonly known as:  LASIX   Used for:  Acute pulmonary edema (H)        Dose:  20 mg   Take 1 tablet (20 mg) by mouth 2 times daily   Quantity:  30 tablet   Refills:  0         CONTINUE these medicines which have NOT CHANGED        Dose / Directions    aspirin 81 MG EC tablet   Used for:  Acute systolic congestive heart failure (H)        Dose:  81 mg   Take 1 tablet (81 mg) by mouth daily   Quantity:  100 tablet   Refills:  3       carvedilol 25 MG tablet   Commonly known as:  COREG   Used for:  " Hypertensive crisis        Dose:  37.5 mg   Take 1.5 tablets (37.5 mg) by mouth 2 times daily (with meals)   Quantity:  60 tablet   Refills:  3       CO Q 10 PO        1 tab daily   Refills:  0       hydrALAZINE 50 MG tablet   Commonly known as:  APRESOLINE   Used for:  Hypertensive crisis        Dose:  50 mg   Take 1 tablet (50 mg) by mouth 3 times daily   Quantity:  90 tablet   Refills:  3       latanoprost 0.005 % ophthalmic solution   Commonly known as:  XALATAN        Dose:  1 drop   Place 1 drop Into the left eye At Bedtime   Refills:  0       LEVETIRACETAM PO        Keppra 500 mg twice daily   Refills:  0       LYSINE        1 tab daily   Refills:  0       traZODone 50 MG tablet   Commonly known as:  DESYREL        Dose:  25 mg   Take 25 mg by mouth At Bedtime   Refills:  0       ZINC ACETATE        Dose:  1 tablet   1 tablet daily   Refills:  0         STOP taking     NIFEdipine ER 60 MG 24 hr tablet   Commonly known as:  ADALAT CC                Where to get your medicines      These medications were sent to [facility] - [address]  [address] [facility] [address], [facility] [address]     Phone:  [phone]     amLODIPine 5 MG tablet    furosemide 20 MG tablet                Protect others around you: Learn how to safely use, store and throw away your medicines at www.disposemymeds.org.             Medication List: This is a list of all your medications and when to take them. Check marks below indicate your daily home schedule. Keep this list as a reference.      Medications           Morning Afternoon Evening Bedtime As Needed    amLODIPine 5 MG tablet   Commonly known as:  NORVASC   Take 1 tablet (5 mg) by mouth 2 times daily   Last time this was given:  5 mg on 11/10/2017 10:13 AM   Next Dose Due:  11/10/2017 9 pm                                      aspirin 81 MG EC tablet   Take 1 tablet (81 mg) by mouth daily   Last time this was given:  81 mg on 11/10/2017 10:09 AM   Next  Dose Due:  11/11/2017 9 am                                   carvedilol 25 MG tablet   Commonly known as:  COREG   Take 1.5 tablets (37.5 mg) by mouth 2 times daily (with meals)   Last time this was given:  37.5 mg on 11/10/2017 10:09 AM   Next Dose Due:  11/10/2017 9 pm                                        CO Q 10 PO   1 tab daily                                furosemide 20 MG tablet   Commonly known as:  LASIX   Take 1 tablet (20 mg) by mouth 2 times daily   Last time this was given:  20 mg on 11/10/2017 10:09 AM   Next Dose Due:  11/10/2017 9 pm                                        hydrALAZINE 50 MG tablet   Commonly known as:  APRESOLINE   Take 1 tablet (50 mg) by mouth 3 times daily   Last time this was given:  50 mg on 11/10/2017 10:10 AM   Next Dose Due:  11/10/2017 4 pm                                         latanoprost 0.005 % ophthalmic solution   Commonly known as:  XALATAN   Place 1 drop Into the left eye At Bedtime   Last time this was given:  1 drop on 11/9/2017  9:08 PM   Next Dose Due:  11/10/2017 9 pm bedtime                                     LEVETIRACETAM PO   Keppra 500 mg twice daily   Last time this was given:  500 mg on 11/10/2017 10:09 AM   Next Dose Due:  11/10/2017 9 pm                                        LYSINE   1 tab daily   Next Dose Due:  11/11/2017 9 am                                     traZODone 50 MG tablet   Commonly known as:  DESYREL   Take 25 mg by mouth At Bedtime   Last time this was given:  25 mg on 11/9/2017 10:18 PM   Next Dose Due:  11/10/2017 9 pm bedtime                                       ZINC ACETATE   1 tablet daily   Next Dose Due:  11/11/2017 9 am

## 2017-11-07 NOTE — IP AVS SNAPSHOT
[facility]    [address] [place] [address]., [address]    [place] [address]    Phone:  [phone]                                       After Visit Summary   11/7/2017    Aubrey Freeman    MRN: [id number]           After Visit Summary Signature Page     I have received my discharge instructions, and my questions have been answered. I have discussed any challenges I see with this plan with the nurse or doctor.    ..........................................................................................................................................  Patient/Patient Representative Signature      ..........................................................................................................................................  Patient Representative Print Name and Relationship to Patient    ..................................................               ................................................  Date                                            Time    ..........................................................................................................................................  Reviewed by Signature/Title    ...................................................              ..............................................  Date                                                            Time

## 2017-11-07 NOTE — ED NOTES
Hx of CHF. Started feeling short of breath over the last week worse tonight. Pt having chest pain off and on for a week.

## 2017-11-07 NOTE — CONSULTS
BRIEF NUTRITION NOTE:    Received routine Nutrition Consult for 2 gm Na diet education (for CHF).  Pt currently on bipap and not appropriate to see at this time.  Will check for diet education needs after ICU, prior to discharge.    Heena Miller RD, LD, CNSC

## 2017-11-07 NOTE — PHARMACY-ADMISSION MEDICATION HISTORY
Admission medication history interview status for the 11/7/2017  admission is complete. See EPIC admission navigator for prior to admission medications     Medication history source reliability:Good    Actions taken by pharmacist (provider contacted, etc):None     Additional medication history information not noted on PTA med list : Medication bottles were available with wife    Medication reconciliation/reorder completed by provider prior to medication history? Yes    Time spent in this activity:  20 mins    Prior to Admission medications    Medication Sig Last Dose Taking? Auth Provider   aspirin EC 81 MG EC tablet Take 1 tablet (81 mg) by mouth daily 11/5/2017 at AM Yes Jered Yin MD   hydrALAZINE (APRESOLINE) 50 MG tablet Take 1 tablet (50 mg) by mouth 3 times daily 11/6/2017 at AM Yes Jered Yin MD   NIFEdipine ER (ADALAT CC) 60 MG 24 hr tablet Take 1 tablet (60 mg) by mouth daily 11/6/2017 at 22:00 Yes Lynda Villaseñor APRN CNP   carvedilol (COREG) 25 MG tablet Take 1.5 tablets (37.5 mg) by mouth 2 times daily (with meals) 11/6/2017 at AM Yes Jered Yin MD   traZODone (DESYREL) 50 MG tablet Take 25 mg by mouth At Bedtime 11/6/2017 at PM Yes Reported, Patient   latanoprost (XALATAN) 0.005 % ophthalmic solution Place 1 drop Into the left eye At Bedtime 11/6/2017 at PM Yes Reported, Patient   ZINC ACETATE 1 tablet daily  11/6/2017 at AM Yes Reported, Patient   CO Q 10 OR 1 tab daily 11/6/2017 at AM Yes Reported, Patient   LYSINE 1 tab daily 11/6/2017 at AM Yes Reported, Patient   LEVETIRACETAM OR Keppra 500 mg twice daily 11/6/2017 at AM Yes Reported, Patient

## 2017-11-07 NOTE — CONSULTS
CORE Clinic referral received from Dr. Guerin. Reviewed w/ Cardiac Transitions RN, Priscilla. Patient is not currently established in the CORE Clinic. He had follow up scheduled with one of our general OSCAR's after his last admit. Given his readmission, will cancel that visit and schedule him to see a CORE provider.       Nutrition consult has been ordered, appreciate assistance.  Hospital nurse, please give pt CORE Clinic/HF education.       CORE Clinic appointment made for Monday 11/13/17 for BMP and visit w/ Roxanna More PAC        We look forward to seeing Aubrey in the clinic.   Please call with questions.     Nina Larsen RN, BSN  CORE Clinic Care Coordinator  [phone]      [facility]: Cardiomyopathy, Optimization, Rehabilitation, Education   The [facility].R.E[facility] Clinic is a heart failure specialty clinic within the [facility] where you will work with your cardiologist, nurse practitioners, physician assistants and registered nurses who specialize in heart failure care. They are dedicated to helping patients with heart failure to carefully adjust medications, receive education, and learn who and when to call if symptoms develop. They specialize in helping you better understand your condition, slow the progression of your disease, improve the length and quality of your life, help you detect future heart problems before they become life threatening, and avoid hospitalizations.

## 2017-11-07 NOTE — ED NOTES
Bed: ST  Expected date: 11/7/17  Expected time: 2:45 AM  Means of arrival: Ambulance  Comments:  Bethany 536 67M SOB/CHF

## 2017-11-07 NOTE — PROGRESS NOTES
Pt seen and examined. Seen by my colleague Dr Guerin in the morning. H/P, labs, vital signs and orders reviewed. Agree with his A/P.    Patient presenting with acute onset shortness of breath that started last night.  Also endorsed orthopnea.  Chest x-ray at presentation consistent with pulmonary edema.  Presentation consistent with acute diastolic heart failure exacerbation    Started on IV diuretics, nitroglycerin drip and placed on BiPAP.   Symptomatically much improved at the moment.  Cardiology following  Also noted hyponatremia, likely due to congestive heart failure  Also has leukocytosis of 16.9, likely stress related; recheck in the morning.  Recheck BMP in the morning  Reassess in AM

## 2017-11-07 NOTE — PROGRESS NOTES
Patient on BiPAP 10/5 most of the morning.  Now on HFNC 35%, 30lpm, RR 20-25bpm.  SpO2 96%.   Will continue to monitor.
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
## 2017-11-08 LAB
ANION GAP SERPL CALCULATED.3IONS-SCNC: 9 MMOL/L (ref 3–14)
BASOPHILS # BLD AUTO: 0 10E9/L (ref 0–0.2)
BASOPHILS NFR BLD AUTO: 0.2 %
BUN SERPL-MCNC: 37 MG/DL (ref 7–30)
CALCIUM SERPL-MCNC: 8 MG/DL (ref 8.5–10.1)
CHLORIDE SERPL-SCNC: 96 MMOL/L (ref 94–109)
CO2 SERPL-SCNC: 23 MMOL/L (ref 20–32)
CREAT SERPL-MCNC: 1.59 MG/DL (ref 0.66–1.25)
DIFFERENTIAL METHOD BLD: ABNORMAL
EOSINOPHIL # BLD AUTO: 0.2 10E9/L (ref 0–0.7)
EOSINOPHIL NFR BLD AUTO: 2 %
ERYTHROCYTE [DISTWIDTH] IN BLOOD BY AUTOMATED COUNT: 13 % (ref 10–15)
GFR SERPL CREATININE-BSD FRML MDRD: 44 ML/MIN/1.7M2
GLUCOSE SERPL-MCNC: 99 MG/DL (ref 70–99)
HCT VFR BLD AUTO: 33 % (ref 40–53)
HGB BLD-MCNC: 11.6 G/DL (ref 13.3–17.7)
IMM GRANULOCYTES # BLD: 0 10E9/L (ref 0–0.4)
IMM GRANULOCYTES NFR BLD: 0.4 %
LYMPHOCYTES # BLD AUTO: 1.3 10E9/L (ref 0.8–5.3)
LYMPHOCYTES NFR BLD AUTO: 12.9 %
MCH RBC QN AUTO: 30 PG (ref 26.5–33)
MCHC RBC AUTO-ENTMCNC: 35.2 G/DL (ref 31.5–36.5)
MCV RBC AUTO: 85 FL (ref 78–100)
MONOCYTES # BLD AUTO: 1.2 10E9/L (ref 0–1.3)
MONOCYTES NFR BLD AUTO: 11.7 %
NEUTROPHILS # BLD AUTO: 7.1 10E9/L (ref 1.6–8.3)
NEUTROPHILS NFR BLD AUTO: 72.8 %
NRBC # BLD AUTO: 0 10*3/UL
NRBC BLD AUTO-RTO: 0 /100
PLATELET # BLD AUTO: 225 10E9/L (ref 150–450)
POTASSIUM SERPL-SCNC: 3.5 MMOL/L (ref 3.4–5.3)
PROCALCITONIN SERPL-MCNC: 0.14 NG/ML
RBC # BLD AUTO: 3.87 10E12/L (ref 4.4–5.9)
SODIUM SERPL-SCNC: 128 MMOL/L (ref 133–144)
WBC # BLD AUTO: 9.8 10E9/L (ref 4–11)

## 2017-11-08 PROCEDURE — 36415 COLL VENOUS BLD VENIPUNCTURE: CPT | Performed by: HOSPITALIST

## 2017-11-08 PROCEDURE — 84145 PROCALCITONIN (PCT): CPT | Performed by: HOSPITALIST

## 2017-11-08 PROCEDURE — 85025 COMPLETE CBC W/AUTO DIFF WBC: CPT | Performed by: INTERNAL MEDICINE

## 2017-11-08 PROCEDURE — 40000886 ZZH STATISTIC STEP DOWN HRS DAY

## 2017-11-08 PROCEDURE — 36415 COLL VENOUS BLD VENIPUNCTURE: CPT | Performed by: INTERNAL MEDICINE

## 2017-11-08 PROCEDURE — 99233 SBSQ HOSP IP/OBS HIGH 50: CPT | Performed by: HOSPITALIST

## 2017-11-08 PROCEDURE — 25000128 H RX IP 250 OP 636: Performed by: INTERNAL MEDICINE

## 2017-11-08 PROCEDURE — 27210339 ZZH CIRCUIT HUMIDITY W/CPAP BIP

## 2017-11-08 PROCEDURE — 25000132 ZZH RX MED GY IP 250 OP 250 PS 637: Performed by: INTERNAL MEDICINE

## 2017-11-08 PROCEDURE — 80048 BASIC METABOLIC PNL TOTAL CA: CPT | Performed by: INTERNAL MEDICINE

## 2017-11-08 PROCEDURE — 21000000 ZZH R&B IMCU HEART CARE

## 2017-11-08 PROCEDURE — 99233 SBSQ HOSP IP/OBS HIGH 50: CPT | Performed by: INTERNAL MEDICINE

## 2017-11-08 PROCEDURE — 40000275 ZZH STATISTIC RCP TIME EA 10 MIN

## 2017-11-08 PROCEDURE — 94660 CPAP INITIATION&MGMT: CPT

## 2017-11-08 RX ORDER — NITROGLYCERIN 0.4 MG/1
0.4 TABLET SUBLINGUAL EVERY 5 MIN PRN
Status: DISCONTINUED | OUTPATIENT
Start: 2017-11-08 | End: 2017-11-09

## 2017-11-08 RX ORDER — LIDOCAINE 40 MG/G
CREAM TOPICAL
Status: DISCONTINUED | OUTPATIENT
Start: 2017-11-08 | End: 2017-11-09

## 2017-11-08 RX ADMIN — AMLODIPINE BESYLATE 5 MG: 5 TABLET ORAL at 09:09

## 2017-11-08 RX ADMIN — ASPIRIN 81 MG: 81 TABLET, COATED ORAL at 09:09

## 2017-11-08 RX ADMIN — LEVETIRACETAM 500 MG: 500 TABLET, FILM COATED ORAL at 21:40

## 2017-11-08 RX ADMIN — Medication 25 MG: at 21:40

## 2017-11-08 RX ADMIN — CARVEDILOL 37.5 MG: 25 TABLET, FILM COATED ORAL at 17:43

## 2017-11-08 RX ADMIN — LATANOPROST 1 DROP: 50 SOLUTION OPHTHALMIC at 21:40

## 2017-11-08 RX ADMIN — FUROSEMIDE 20 MG: 10 INJECTION, SOLUTION INTRAVENOUS at 09:09

## 2017-11-08 RX ADMIN — HYDRALAZINE HYDROCHLORIDE 50 MG: 50 TABLET ORAL at 21:40

## 2017-11-08 RX ADMIN — HYDRALAZINE HYDROCHLORIDE 50 MG: 50 TABLET ORAL at 09:09

## 2017-11-08 RX ADMIN — CARVEDILOL 37.5 MG: 25 TABLET, FILM COATED ORAL at 09:09

## 2017-11-08 RX ADMIN — LEVETIRACETAM 500 MG: 500 TABLET, FILM COATED ORAL at 09:09

## 2017-11-08 RX ADMIN — HYDRALAZINE HYDROCHLORIDE 50 MG: 50 TABLET ORAL at 17:43

## 2017-11-08 RX ADMIN — LISINOPRIL 5 MG: 5 TABLET ORAL at 09:09

## 2017-11-08 NOTE — PROGRESS NOTES
[facility]    Hospitalist Progress Note  Name: Aubrey Freeman    MRN: [id number]  Provider:  Mykel Tang DO, MPH  Date of Service: 11/08/2017    Summary of Stay: Aubrey Freeman is a 67 year old male with a history of TBI, HFpEF, HTN admitted on 11/7/2017 with acute respiratory failure due to ADHF.      Problem List:   1. Acute hypoxic respiratory failure due to ADHF: Respiratory status much more stable, off BiPAP and transferred out of ICU on 2L O2. Continue to wean O2 as able. CXR with e/o atelectasis vs PNA, I favor the former. Check procal, WBC improved (likely stress demargination), no fever. No abx at this time.  2. ADHF: Preserved EF on recent TTE (less than 2 wk ago). Cardiology consulted and following. Trop negative, recent negative ischemic work up. Weight down 7 kg overnight (97 to 90 kg) but still elevated as compared to 10/30 (86 kg). However only recorded net negative -1L since admission. Will hold further IV lasix given rapid weight drop and increase in creatinine. Will see what cardiology thinks about med mgmt moving forward.  3. HTN: BP reasonable, most recent 115/70. Hold lisinopril given rise in creatinine (only given 2 doses). Continue coreg 37.5 bid. Nifedipine changed to amlodipine per cardiology. Also on hydralazine 50 mg TID.  4. TBI: Continue PTA Keppra.  5. JESSE: Creatinine increased from 1.0 to 1.5 overnight with diuresis and initiation of lisinopril. Will hold diuretic and ACEi for now.   6. Hypervolemic hyponatremia: Na improved with diuresis as expected.     DVT Prophylaxis: Pneumatic Compression Devices  Code Status: Full Code  Disposition: Expected discharge in 2-3 days to home. Goals prior to discharge include manage heart failure, med optimization, follow renal function.   Family updated today: Yes. Wife admits to significant anxiety/concern about his condition and medication side effects. I have indicated the meds (particularly diuretics) were required for  his respiratory failure which has significantly improved. Unfortunately now has mild rise in creatinine which will need to be closely monitored but optimistic this will rapidly stabilize/improve. All her questions and concerns were addressed.     Interval History   Assumed care from previous hospitalist. The history was fully reviewed.  The patient reports doing well. No chest pain or shortness of breath. No nausea, vomiting, diarrhea, constipation. No fevers. No other specific complaints identified.     -Data reviewed today: I reviewed all new labs and imaging results over the last 24 hours.     Physical Exam   Temp: 98.8  F (37.1  C) Temp src: Axillary BP: 115/70   Heart Rate: 61 Resp: 25 SpO2: 96 % O2 Device: Nasal cannula Oxygen Delivery: 3 LPM  Vitals:    11/07/17 0254 11/07/17 0500   Weight: 97.2 kg (214 lb 4.6 oz) 91.5 kg (201 lb 11.5 oz)     Vital Signs with Ranges  Temp:  [97.7  F (36.5  C)-98.8  F (37.1  C)] 98.8  F (37.1  C)  Heart Rate:  [58-69] 61  Resp:  [17-28] 25  BP: (113-134)/(68-85) 115/70  FiO2 (%):  [30 %-35 %] 35 %  SpO2:  [95 %-97 %] 96 %  I/O last 3 completed shifts:  In: 1209.79 [P.O.:1200; I.V.:9.79]  Out: 1425 [Urine:1425]    GENERAL: No apparent distress. Awake, alert, and fully oriented.  HEENT: Normocephalic, atraumatic. Extraocular movements intact.  CARDIOVASCULAR: Regular rate and rhythm without murmurs or rubs. No S3.  PULMONARY: Clear bilaterally.  GASTROINTESTINAL: Soft, non-tender, non-distended. Bowel sounds normoactive.   EXTREMITIES: No cyanosis or clubbing. 1+ BL LE edema.  NEUROLOGICAL: CN 2-12 grossly intact, no focal neurological deficits.  DERMATOLOGICAL: No rash, ulcer, bruising, nor jaundice.     Medications     Reason ACE/ARB/ARNI order not selected       - MEDICATION INSTRUCTIONS -       - MEDICATION INSTRUCTIONS -         sodium chloride (PF)  3 mL Intracatheter Q8H     aspirin  81 mg Oral Daily     carvedilol  37.5 mg Oral BID w/meals     hydrALAZINE  50 mg Oral TID      latanoprost  1 drop Left Eye At Bedtime     traZODone  25 mg Oral At Bedtime     levETIRAcetam  500 mg Oral BID     amLODIPine  5 mg Oral Daily     Data     Laboratory:    Recent Labs  Lab 11/08/17  0545 11/07/17  0250   WBC 9.8 16.9*   HGB 11.6* 13.3   HCT 33.0* 36.8*   MCV 85 84    255       Recent Labs  Lab 11/08/17  0545 11/07/17  0250   * 126*   POTASSIUM 3.5 4.7   CHLORIDE 96 97   CO2 23 20   ANIONGAP 9 9   GLC 99 167*   BUN 37* 30   CR 1.59* 1.00   GFRESTIMATED 44* 75   GFRESTBLACK 53* >90   ANGEL 8.0* 8.7     No results for input(s): CULT in the last 168 hours.    Imaging:  No results found for this or any previous visit (from the past 24 hour(s)).      Mykel Tang DO MPH  [facility] Hospitalist  [address] [place] [address]  Pager: [phone]  11/08/2017

## 2017-11-08 NOTE — PROGRESS NOTES
"Cardiology Progress Note          Assessment and Plan:         1. ADHF  2. HTN  3. Recent JESSE     PLAN  - Patient symptomatically improved with diuretic therapy but now with recurrent azotemia (very susceptible to changes in renal perfusion pressure)  - agree with holding diuretics and ACE for now but would restart low dose PO lasix before discharge        Interval History:     Feels that breathing is back to baseline. Cr increased to 1.5 this AM, diuretics and ACE stopped.              Review of Systems:   As per subjective, otherwise 5 systems reviewed and negative.           Physical Exam:   Blood pressure 115/70, temperature 98.8  F (37.1  C), temperature source Axillary, resp. rate 25, height 1.702 m (5' 7.01\"), weight 91.5 kg (201 lb 11.5 oz), SpO2 96 %.      Vital Sign Ranges  Temperature Temp  Av.1  F (36.7  C)  Min: 97.7  F (36.5  C)  Max: 98.8  F (37.1  C)   Blood pressure Systolic (24hrs), Av , Min:113 , Max:134        Diastolic (24hrs), Av, Min:68, Max:85      Pulse No Data Recorded   Respirations Resp  Av.2  Min: 17  Max: 27   Pulse oximetry SpO2  Av.1 %  Min: 95 %  Max: 97 %         Intake/Output Summary (Last 24 hours) at 17 1324  Last data filed at 17 1200   Gross per 24 hour   Intake             1200 ml   Output             1225 ml   Net              -25 ml       Constitutional: NAD  Skin: Warm and dry  Neck: No JVD  Lungs: CTA  Cardiovascular: RRR, no m/r/g  Abdomen: Soft, non tender.  Extremities and Back: No LE edema  Neurological: Nonfocal           Medications:     I have reviewed this patient's current medications.              Data:     Labs reviewed.     Tele: SATYA Chavez M.D.    "

## 2017-11-08 NOTE — CONSULTS
"REASON FOR ASSESSMENT:  CHF Consult for 2 gm NA Diet Education    NUTRITION HISTORY:    Visited with pt and wife this afternoon    Living situation:   Pt lives with wife    Grocery shopping:  Wife does the shopping    Meal preparation:  Wife does the cooking  They have been following the [employer] Diet (lean protein, fruits and veggies, limited grains/starches)  Wife does not buy packaged foods    Breakfast:  Poached egg  or  Oatmeal (from canister) with raisins  or  Fruit and yogurt smoothie    Lunch:  Tuna fish/mackerel/sardines and nuts      Dinner:   Poultry or fish  Salad with tomatoes    Pt does not snack between meals  Wife notes that occ he will do \"a quick side shake of the salt shaker\"  Wife makes her own \"spead\" with flaxseed oil mixed with lecithin    Previous diet instructions:  Pt has never received low Na diet teaching       CURRENT DIET:  2gm Na    NUTRITION DIAGNOSIS:  Food- and nutrition-related knowledge deficit R/t no prior diet education AEB pt's wife states this is new for them      INTERVENTIONS:    Nutrition Prescription:  2gm Na    Implementation:    Assessed learning needs, learning preferences, and willingness to learn    Nutrition Education (Content):  a) Provided handouts:  1) Tips for Low Na Diet  2) Low Na Foods/Drinks  3) Seasoning Your Food Without Salt  b) Discussed rational for limiting Na for CHF and stressed importance of following 2 gm Na guidelines     Nutrition Education (Application):  a) Discussed current eating habits and recommended alternative food choices    Anticipated good compliance - pt and wife very receptive to visit    Diet Education - refer to Education Flowsheet    Goals:    Patient verbalizes understanding of diet     All of the above goals met during the education session    Follow Up:    Provided RD contact information for future questions.    Recommend Out-Patient Nutrition Referral, if further diet instructions are needed.          "

## 2017-11-08 NOTE — PROGRESS NOTES
Pt has been on BIPAP 10/5 35% throughout the night, gel pad and mepilex under the mask. will continue to monitor.  11/8/2017  Tori Carr

## 2017-11-08 NOTE — PROGRESS NOTES
Patient weaned off of HFNC. Now on 3L NC with SpO2 in the mid to upper 90's.   11/7/2017  Varsha Rangel RRT

## 2017-11-08 NOTE — PLAN OF CARE
Problem: Patient Care Overview  Goal: Plan of Care/Patient Progress Review  CR: Attempted to see pt, leaving for proc and tx to [facility].

## 2017-11-08 NOTE — PLAN OF CARE
Report called to heart center. All questions answered as able. All belongings sent with patient. Voicemail left for wife to make her aware of transfer.

## 2017-11-08 NOTE — PLAN OF CARE
Problem: Patient Care Overview  Goal: Individualization & Mutuality  Outcome: Improving  Patient orientated x4. On 3L NC and tolerating. Lungs clear and diminished. Patient up SBA. Patient eating 2g sodium diet. Wife updated bedside throughout the day. All questions answered as able. Medication information sheets given to wife for all patient's current medications. Nitro drip off since 0830. No chest pain reported. No other concerns to note. Will continue to monitor.

## 2017-11-08 NOTE — PROGRESS NOTES
Pt transferred from ICU with all belongings.  Tele SB. Continues with 2-3 + LE edema. Up w/ st. By to BSC. Weaned off o2. Following creatinine. Continue to monitor.

## 2017-11-09 LAB
ANION GAP SERPL CALCULATED.3IONS-SCNC: 7 MMOL/L (ref 3–14)
BUN SERPL-MCNC: 32 MG/DL (ref 7–30)
CALCIUM SERPL-MCNC: 8.2 MG/DL (ref 8.5–10.1)
CHLORIDE SERPL-SCNC: 93 MMOL/L (ref 94–109)
CO2 SERPL-SCNC: 24 MMOL/L (ref 20–32)
CREAT SERPL-MCNC: 1.41 MG/DL (ref 0.66–1.25)
ERYTHROCYTE [DISTWIDTH] IN BLOOD BY AUTOMATED COUNT: 13 % (ref 10–15)
GFR SERPL CREATININE-BSD FRML MDRD: 50 ML/MIN/1.7M2
GLUCOSE SERPL-MCNC: 105 MG/DL (ref 70–99)
HCT VFR BLD AUTO: 32.8 % (ref 40–53)
HGB BLD-MCNC: 11.8 G/DL (ref 13.3–17.7)
MCH RBC QN AUTO: 30.4 PG (ref 26.5–33)
MCHC RBC AUTO-ENTMCNC: 36 G/DL (ref 31.5–36.5)
MCV RBC AUTO: 85 FL (ref 78–100)
PLATELET # BLD AUTO: 229 10E9/L (ref 150–450)
POTASSIUM SERPL-SCNC: 3.7 MMOL/L (ref 3.4–5.3)
RBC # BLD AUTO: 3.88 10E12/L (ref 4.4–5.9)
SODIUM SERPL-SCNC: 124 MMOL/L (ref 133–144)
WBC # BLD AUTO: 9.8 10E9/L (ref 4–11)

## 2017-11-09 PROCEDURE — 99233 SBSQ HOSP IP/OBS HIGH 50: CPT | Performed by: HOSPITALIST

## 2017-11-09 PROCEDURE — 40000275 ZZH STATISTIC RCP TIME EA 10 MIN

## 2017-11-09 PROCEDURE — 25000132 ZZH RX MED GY IP 250 OP 250 PS 637: Performed by: INTERNAL MEDICINE

## 2017-11-09 PROCEDURE — 21000001 ZZH R&B HEART CARE

## 2017-11-09 PROCEDURE — 94660 CPAP INITIATION&MGMT: CPT

## 2017-11-09 PROCEDURE — 36415 COLL VENOUS BLD VENIPUNCTURE: CPT | Performed by: HOSPITALIST

## 2017-11-09 PROCEDURE — 85027 COMPLETE CBC AUTOMATED: CPT | Performed by: HOSPITALIST

## 2017-11-09 PROCEDURE — 80048 BASIC METABOLIC PNL TOTAL CA: CPT | Performed by: HOSPITALIST

## 2017-11-09 PROCEDURE — 99232 SBSQ HOSP IP/OBS MODERATE 35: CPT | Performed by: INTERNAL MEDICINE

## 2017-11-09 PROCEDURE — 25000132 ZZH RX MED GY IP 250 OP 250 PS 637: Performed by: HOSPITALIST

## 2017-11-09 RX ORDER — FUROSEMIDE 20 MG
20 TABLET ORAL
Status: DISCONTINUED | OUTPATIENT
Start: 2017-11-09 | End: 2017-11-10 | Stop reason: HOSPADM

## 2017-11-09 RX ADMIN — FUROSEMIDE 20 MG: 20 TABLET ORAL at 09:24

## 2017-11-09 RX ADMIN — CARVEDILOL 37.5 MG: 25 TABLET, FILM COATED ORAL at 09:23

## 2017-11-09 RX ADMIN — HYDRALAZINE HYDROCHLORIDE 50 MG: 50 TABLET ORAL at 09:24

## 2017-11-09 RX ADMIN — LEVETIRACETAM 500 MG: 500 TABLET, FILM COATED ORAL at 09:24

## 2017-11-09 RX ADMIN — CARVEDILOL 37.5 MG: 25 TABLET, FILM COATED ORAL at 17:14

## 2017-11-09 RX ADMIN — Medication 25 MG: at 22:18

## 2017-11-09 RX ADMIN — LEVETIRACETAM 500 MG: 500 TABLET, FILM COATED ORAL at 22:18

## 2017-11-09 RX ADMIN — HYDRALAZINE HYDROCHLORIDE 50 MG: 50 TABLET ORAL at 21:07

## 2017-11-09 RX ADMIN — ASPIRIN 81 MG: 81 TABLET, COATED ORAL at 09:24

## 2017-11-09 RX ADMIN — HYDRALAZINE HYDROCHLORIDE 50 MG: 50 TABLET ORAL at 17:14

## 2017-11-09 RX ADMIN — AMLODIPINE BESYLATE 5 MG: 5 TABLET ORAL at 09:24

## 2017-11-09 RX ADMIN — LATANOPROST 1 DROP: 50 SOLUTION OPHTHALMIC at 21:08

## 2017-11-09 RX ADMIN — FUROSEMIDE 20 MG: 20 TABLET ORAL at 17:14

## 2017-11-09 NOTE — PROGRESS NOTES
"Cardiology Progress Note          Assessment and Plan:     1) ADHF  2) HTN  3) JESSE        PLAN  - Cr improved. Agree with restarting diuretics.   - Can consider increasing amlodipine if BP remains elevated (would hold off ACE for now)  - hopefully d/c in 1-2 days        Interval History:     Feels that breathing has improved. Cr improved slightly today.              Review of Systems:   As per subjective, otherwise 5 systems reviewed and negative.           Physical Exam:   Blood pressure (!) 140/92, temperature 97.6  F (36.4  C), temperature source Oral, resp. rate 10, height 1.702 m (5' 7.01\"), weight 92 kg (202 lb 13.2 oz), SpO2 96 %.      Vital Sign Ranges  Temperature Temp  Av.7  F (36.5  C)  Min: 97.1  F (36.2  C)  Max: 98.4  F (36.9  C)   Blood pressure Systolic (24hrs), Av , Min:107 , Max:149        Diastolic (24hrs), Av, Min:62, Max:92      Pulse No Data Recorded   Respirations Resp  Av.1  Min: 10  Max: 30   Pulse oximetry SpO2  Av.3 %  Min: 93 %  Max: 97 %         Intake/Output Summary (Last 24 hours) at 17 1150  Last data filed at 17 0900   Gross per 24 hour   Intake             1140 ml   Output             1450 ml   Net             -310 ml       Constitutional: NAD  Skin: Warm and dry  Neck: No JVD  Lungs: CTA  Cardiovascular: RRR, no m/r/g  Abdomen: Soft, non tender.  Extremities and Back: No LE edema  Neurological: Nonfocal           Medications:     I have reviewed this patient's current medications.              Data:     Labs reviewed.     Tele: SATYA Chavez M.D.    "

## 2017-11-09 NOTE — PROGRESS NOTES
[facility]    Hospitalist Progress Note  Name: Aubrey Freeman    MRN: [id number]  Provider:  Mykel Tang DO, MPH  Date of Service: 11/09/2017    Summary of Stay: Aubrey Freeman is a 67 year old male with a history of TBI, HFpEF, HTN admitted on 11/7/2017 with acute respiratory failure due to ADHF.      Problem List:   1. Acute hypoxic respiratory failure due to ADHF: Respiratory status much more stable, off BiPAP and now on RA. CXR with e/o atelectasis vs PNA, I favor the former. Procal low, WBC improved (likely stress demargination), no fever. No abx at this time.  2. ADHF: Preserved EF on recent TTE (less than 2 wk ago). Cardiology consulted and following. Trop negative, recent negative ischemic work up. Weight relatively stable at 92 kg overnight (but down 5-6 kg since admission), compared to 10/30 (86 kg). However only recorded net negative -1 L since admission. Will resume oral lasix today 20 mg BID.  3. HTN: BP reasonable, most recent 140/92. Holding lisinopril given recent rise in creatinine. Continue coreg 37.5 bid. Nifedipine changed to amlodipine per cardiology. Also on hydralazine 50 mg TID. Resuming diuretics today which might help as well.  4. TBI: Continue PTA Keppra.  5. JESSE: Creatinine increased from 1.0 to 1.5 with initial diuresis and initiation of lisinopril. Diuretic and ACEi held and creatinine improved today. AM BMP.   6. Hypervolemic hyponatremia: Na slightly worse to 124. Will need close monitoring. Liberalize salt in diet. Check urine sodium and urine osmolality.   7. SOCO: He is bringing in home CPAP.    DVT Prophylaxis: Pneumatic Compression Devices  Code Status: Full Code  Disposition: Expected discharge in 2-3 days to home. Goals prior to discharge include manage heart failure, med optimization, follow renal function.   Family updated today: I left a voicemail for his wife Kristen with updates of POC.     Interval History   The patient reports doing well. No chest  pain or shortness of breath. No nausea, vomiting, diarrhea, constipation. No fevers. Some poor sleep. No other specific complaints identified.     -Data reviewed today: I reviewed all new labs and imaging results over the last 24 hours.     Physical Exam   Temp: 97.6  F (36.4  C) Temp src: Oral BP: (!) 140/92   Heart Rate: 56 Resp: 10 SpO2: 96 % O2 Device: BiPAP/CPAP Oxygen Delivery: 3 LPM  Vitals:    11/07/17 0254 11/07/17 0500 11/09/17 0512   Weight: 97.2 kg (214 lb 4.6 oz) 91.5 kg (201 lb 11.5 oz) 92 kg (202 lb 13.2 oz)     Vital Signs with Ranges  Temp:  [97.1  F (36.2  C)-98.4  F (36.9  C)] 97.6  F (36.4  C)  Heart Rate:  [50-61] 56  Resp:  [10-30] 10  BP: (107-149)/(62-92) 140/92  FiO2 (%):  [21 %] 21 %  SpO2:  [93 %-97 %] 96 %  I/O last 3 completed shifts:  In: 760 [P.O.:760]  Out: 750 [Urine:750]    GENERAL: No apparent distress. Awake, alert, and fully oriented.  HEENT: Normocephalic, atraumatic. Extraocular movements intact.  CARDIOVASCULAR: Regular rate and rhythm without murmurs or rubs. No S3.  PULMONARY: Clear bilaterally.  GASTROINTESTINAL: Soft, non-tender, non-distended. Bowel sounds normoactive.   EXTREMITIES: No cyanosis or clubbing. Trace BL LE edema.  NEUROLOGICAL: CN 2-12 grossly intact, no focal neurological deficits.  DERMATOLOGICAL: No rash, ulcer, bruising, nor jaundice.     Medications     Reason ACE/ARB/ARNI order not selected       - MEDICATION INSTRUCTIONS -       - MEDICATION INSTRUCTIONS -         furosemide  20 mg Oral BID     sodium chloride (PF)  3 mL Intracatheter Q8H     aspirin  81 mg Oral Daily     carvedilol  37.5 mg Oral BID w/meals     hydrALAZINE  50 mg Oral TID     latanoprost  1 drop Left Eye At Bedtime     traZODone  25 mg Oral At Bedtime     levETIRAcetam  500 mg Oral BID     amLODIPine  5 mg Oral Daily     Data     Laboratory:    Recent Labs  Lab 11/09/17  0647 11/08/17  0545 11/07/17  0250   WBC 9.8 9.8 16.9*   HGB 11.8* 11.6* 13.3   HCT 32.8* 33.0* 36.8*   MCV 85 85  84    225 255       Recent Labs  Lab 11/09/17  0647 11/08/17  0545 11/07/17  0250   * 128* 126*   POTASSIUM 3.7 3.5 4.7   CHLORIDE 93* 96 97   CO2 24 23 20   ANIONGAP 7 9 9   * 99 167*   BUN 32* 37* 30   CR 1.41* 1.59* 1.00   GFRESTIMATED 50* 44* 75   GFRESTBLACK 61 53* >90   ANGEL 8.2* 8.0* 8.7     No results for input(s): CULT in the last 168 hours.    Imaging:  No results found for this or any previous visit (from the past 24 hour(s)).      Mykel Tang DO MPH  [facility] Hospitalist  [address] [place] [address]  Pager: [phone]  11/09/2017

## 2017-11-09 NOTE — PLAN OF CARE
Problem: Cardiac: Heart Failure (Adult)  Goal: Signs and Symptoms of Listed Potential Problems Will be Absent, Minimized or Managed (Cardiac: Heart Failure)  Signs and symptoms of listed potential problems will be absent, minimized or managed by discharge/transition of care (reference Cardiac: Heart Failure (Adult) CPG).   Outcome: No Change  [id number]: VSS. No c/o pain. SB/SR 50-70's. Up w/ SBA. Pt is on RA, sat'ing mid-90's. Plan is to continue diuresis and monitor respiratory status. Spouse at bedside and neither have questions at this time. CTM.

## 2017-11-09 NOTE — PLAN OF CARE
Problem: Patient Care Overview  Goal: Plan of Care/Patient Progress Review  PT/CR: Orders received, evaluation attempted. Pt remains on BiPAP, declines transition to NC this morning. Pt refusing all attempts at exercise, mobility, or CHF education stating he slept poorly and needs to sleep despite encouragement.

## 2017-11-10 ENCOUNTER — APPOINTMENT (OUTPATIENT)
Dept: PHYSICAL THERAPY | Facility: CLINIC | Age: 67
DRG: 291 | End: 2017-11-10
Attending: INTERNAL MEDICINE
Payer: COMMERCIAL

## 2017-11-10 VITALS
SYSTOLIC BLOOD PRESSURE: 157 MMHG | WEIGHT: 189.9 LBS | BODY MASS INDEX: 29.81 KG/M2 | DIASTOLIC BLOOD PRESSURE: 102 MMHG | HEIGHT: 67 IN | HEART RATE: 61 BPM | RESPIRATION RATE: 20 BRPM | OXYGEN SATURATION: 97 % | TEMPERATURE: 97.6 F

## 2017-11-10 LAB
ANION GAP SERPL CALCULATED.3IONS-SCNC: 10 MMOL/L (ref 3–14)
BUN SERPL-MCNC: 23 MG/DL (ref 7–30)
CALCIUM SERPL-MCNC: 8.4 MG/DL (ref 8.5–10.1)
CHLORIDE SERPL-SCNC: 95 MMOL/L (ref 94–109)
CO2 SERPL-SCNC: 23 MMOL/L (ref 20–32)
CREAT SERPL-MCNC: 1 MG/DL (ref 0.66–1.25)
GFR SERPL CREATININE-BSD FRML MDRD: 75 ML/MIN/1.7M2
GLUCOSE SERPL-MCNC: 90 MG/DL (ref 70–99)
POTASSIUM SERPL-SCNC: 3.8 MMOL/L (ref 3.4–5.3)
SODIUM SERPL-SCNC: 128 MMOL/L (ref 133–144)

## 2017-11-10 PROCEDURE — 97110 THERAPEUTIC EXERCISES: CPT | Mod: GP

## 2017-11-10 PROCEDURE — 25000132 ZZH RX MED GY IP 250 OP 250 PS 637: Performed by: INTERNAL MEDICINE

## 2017-11-10 PROCEDURE — 40000193 ZZH STATISTIC PT WARD VISIT

## 2017-11-10 PROCEDURE — 80048 BASIC METABOLIC PNL TOTAL CA: CPT | Performed by: HOSPITALIST

## 2017-11-10 PROCEDURE — 94660 CPAP INITIATION&MGMT: CPT

## 2017-11-10 PROCEDURE — 99239 HOSP IP/OBS DSCHRG MGMT >30: CPT | Performed by: HOSPITALIST

## 2017-11-10 PROCEDURE — 25000132 ZZH RX MED GY IP 250 OP 250 PS 637: Performed by: HOSPITALIST

## 2017-11-10 PROCEDURE — 40000275 ZZH STATISTIC RCP TIME EA 10 MIN

## 2017-11-10 PROCEDURE — 97162 PT EVAL MOD COMPLEX 30 MIN: CPT | Mod: GP

## 2017-11-10 PROCEDURE — 36415 COLL VENOUS BLD VENIPUNCTURE: CPT | Performed by: HOSPITALIST

## 2017-11-10 PROCEDURE — 99232 SBSQ HOSP IP/OBS MODERATE 35: CPT | Performed by: INTERNAL MEDICINE

## 2017-11-10 RX ORDER — AMLODIPINE BESYLATE 5 MG/1
5 TABLET ORAL 2 TIMES DAILY
Qty: 60 TABLET | Refills: 1 | Status: SHIPPED | OUTPATIENT
Start: 2017-11-10 | End: 2017-12-07

## 2017-11-10 RX ORDER — FUROSEMIDE 20 MG
20 TABLET ORAL
Qty: 30 TABLET | Refills: 0 | Status: SHIPPED | OUTPATIENT
Start: 2017-11-10 | End: 2017-11-28 | Stop reason: ALTCHOICE

## 2017-11-10 RX ORDER — AMLODIPINE BESYLATE 5 MG/1
5 TABLET ORAL 2 TIMES DAILY
Status: DISCONTINUED | OUTPATIENT
Start: 2017-11-10 | End: 2017-11-10 | Stop reason: HOSPADM

## 2017-11-10 RX ADMIN — ASPIRIN 81 MG: 81 TABLET, COATED ORAL at 10:09

## 2017-11-10 RX ADMIN — FUROSEMIDE 20 MG: 20 TABLET ORAL at 10:09

## 2017-11-10 RX ADMIN — HYDRALAZINE HYDROCHLORIDE 50 MG: 50 TABLET ORAL at 10:10

## 2017-11-10 RX ADMIN — CARVEDILOL 37.5 MG: 25 TABLET, FILM COATED ORAL at 10:09

## 2017-11-10 RX ADMIN — AMLODIPINE BESYLATE 5 MG: 5 TABLET ORAL at 10:13

## 2017-11-10 RX ADMIN — LEVETIRACETAM 500 MG: 500 TABLET, FILM COATED ORAL at 10:09

## 2017-11-10 NOTE — PLAN OF CARE
Problem: Cardiac: Heart Failure (Adult)  Goal: Signs and Symptoms of Listed Potential Problems Will be Absent, Minimized or Managed (Cardiac: Heart Failure)  Signs and symptoms of listed potential problems will be absent, minimized or managed by discharge/transition of care (reference Cardiac: Heart Failure (Adult) CPG).   Outcome: Improving  Heart Failure Care Pathway  GOALS TO BE MET BEFORE DISCHARGE:     1. Decrease congestion and/or edema with diuretic therapy to achieve near      optimal volume status.            Dyspnea improved: yes           Edema improved:     yes         Net I/O and Weights since admission:          11/04 1500 - 11/09 1459  In: 2679.79 [P.O.:2670; I.V.:9.79]  Out: 3575 [Urine:3575]  Net: -895.21            Vitals:     11/07/17 0254 11/07/17 0500 11/09/17 0512   Weight: 97.2 kg (214 lb 4.6 oz) 91.5 kg (201 lb 11.5 oz) 92 kg (202 lb 13.2 oz)        2.  O2 sats > 92% on RA or at prior home O2 therapy level.           Current oxygenation status: RA       SpO2: 97 %         O2 Device: None (Room air),             Able to wean O2 this shift to keep sats > 92%: Pt been on RA       Does patient use Home O2? No     3.  Tolerates ambulation and mobility near baseline: Yes        How many times did the patient ambulate with nursing staff this shift? Once since he came to the [facility]     Please review the Heart Failure Care Pathway for additional HF goal outcomes.  Transferred from CCU at around 9pm. A&Ox4, forgetful. Denies pain, no SOB reported. BP in 150s, scheduled hydralazine given. TELE SB, HR in 50-60s. Up with SBA. On PO lasix. Plan to d/c in 1-2 days. Will continue to monitor.     Eleonora Lopez RN  11/9/2017
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
## 2017-11-10 NOTE — DISCHARGE SUMMARY
VS stable. Tele monitor shows patient is in SB, HR of 54. Medications discussed, information reviewed, questions and concerns addressed, follow-up instructions reviewed. Pt belongings accounted for and sent home with them. Pt left via wheel chair and driven by wife.
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
## 2017-11-10 NOTE — PLAN OF CARE
Problem: Cardiac: Heart Failure (Adult)  Goal: Signs and Symptoms of Listed Potential Problems Will be Absent, Minimized or Managed (Cardiac: Heart Failure)  Signs and symptoms of listed potential problems will be absent, minimized or managed by discharge/transition of care (reference Cardiac: Heart Failure (Adult) CPG).   Outcome: Improving  Heart Failure Care Pathway  GOALS TO BE MET BEFORE DISCHARGE:     1. Decrease congestion and/or edema with diuretic therapy to achieve near      optimal volume status.            Dyspnea improved:  Yes            Edema improved:     Yes         Net I/O and Weights since admission:          11/04 1500 - 11/09 1459  In: 2679.79 [P.O.:2670; I.V.:9.79]  Out: 3575 [Urine:3575]  Net: -895.21            Vitals:     11/07/17 0254 11/07/17 0500 11/09/17 0512   Weight: 97.2 kg (214 lb 4.6 oz) 91.5 kg (201 lb 11.5 oz) 92 kg (202 lb 13.2 oz)        2.  O2 sats > 92% on RA or at prior home O2 therapy level.           Current oxygenation status:       SpO2: 98 %         O2 Device: None (Room air),             Able to wean O2 this shift to keep sats > 92%:  Yes RA        Does patient use Home O2? No     3.  Tolerates ambulation and mobility near baseline: Yes        How many times did the patient ambulate with nursing staff this shift? 2     Please review the Heart Failure Care Pathway for additional HF goal outcomes.     Valeria Juan RN  11/9/2017   Tele: SR. Denies pain. VSS. Started 20 mg po lasix today. 2 BM. Ambulated halls 2x. [place] [facility] [place].

## 2017-11-10 NOTE — PROGRESS NOTES
[facility]  Cardiology Progress Note  Date of Service: 11/10/2017     Assessment & Plan    Aubrey Freeman is a 67 year old male with past medical history significant for HFpEF, HTN, TBI, admitted on 11/7/2017 with SOB and found to have acute diastolic HF.     Assessment:  1.  Acute on chronic diastolic Heart failure   - 50-55%  - LE edema, no SOB   - down 13 lbs yesterday, 15 lbs from admit  - BB, lasix, hydralazine     2. HTN  - 140-150s  - amlodipine, carvedilol, hydralazine    3. Renal insufficiency   - creat improved 1.41--> 1.00   - ACEi held d/t prev JESSE       Plan:   1. Increase amlodipine to 5 mg BID for HTN       Varsha Lewis  10:18 AM 11/10/2017   [facility]  Pager: [phone]     Interval History   Breathing improved, no SOB, CP, orthopnea. C/O LE edema. Using home CPAP.     Physical Exam   Temp: 97.4  F (36.3  C) Temp src: Axillary BP: (!) 151/91 Pulse: 61 Heart Rate: 54 Resp: 16 SpO2: 98 % O2 Device: BiPAP/CPAP    Vitals:    11/07/17 0500 11/09/17 0512 11/10/17 0355   Weight: 91.5 kg (201 lb 11.5 oz) 92 kg (202 lb 13.2 oz) 86.1 kg (189 lb 14.4 oz)       Constitutional   alert and oriented, in no acute distress.  Skin   warm and dry to touch  ENT   no pallor or cyanosis  Neck  No JVP  Chest   no tenderness to palpation   Lungs CTAB, good airflow deep bilateral lobes.  No wheezes, rales or rhonchi  Cardiac RRR, S1 normal, S2 normal, No S3 or S4, no murmurs, no rubs  Abdomen   abdomen soft  Extremities and Back   no clubbing, cyanosis. Bilat LE 2+ pitting ankle edema.  2+ radial pulse bilaterally  Neurological   no gross motor deficits noted, affect appropriate, oriented to time, person and place.    Medications     Reason ACE/ARB/ARNI order not selected       - MEDICATION INSTRUCTIONS -       - MEDICATION INSTRUCTIONS -         furosemide  20 mg Oral BID     aspirin  81 mg Oral Daily     carvedilol  37.5 mg Oral BID w/meals     hydrALAZINE  50 mg Oral TID     latanoprost  1  drop Left Eye At Bedtime     traZODone  25 mg Oral At Bedtime     levETIRAcetam  500 mg Oral BID     amLODIPine  5 mg Oral Daily       Data   Most Recent 3 CBC's:  Recent Labs   Lab Test  11/09/17   0647  11/08/17   0545  11/07/17   0250   WBC  9.8  9.8  16.9*   HGB  11.8*  11.6*  13.3   MCV  85  85  84   PLT  229  225  255     Most Recent 3 BMP's:  Recent Labs   Lab Test  11/10/17   0627  11/09/17   0647  11/08/17   0545   NA  128*  124*  128*   POTASSIUM  3.8  3.7  3.5   CHLORIDE  95  93*  96   CO2  23  24  23   BUN  23  32*  37*   CR  1.00  1.41*  1.59*   ANIONGAP  10  7  9   ANGEL  8.4*  8.2*  8.0*   GLC  90  105*  99   Most Recent 3 Troponin's:  Recent Labs   Lab Test  11/07/17   0715  11/07/17   0250  10/27/17   0330   TROPI  <0.015  <0.015  0.023   Most Recent 3 BNP's:  Recent Labs   Lab Test  11/07/17   0250  10/26/17   1942   NTBNPI  2391*  3149*     Last 24 hours labs reviewed     Tele: SR 60s

## 2017-11-11 NOTE — PLAN OF CARE
Problem: Patient Care Overview  Goal: Plan of Care/Patient Progress Review  Physical Therapy Discharge Summary     Reason for therapy discharge:    Discharged to home with supervision of wife      Progress towards therapy goal(s). See goals on Care Plan in [facility] electronic health record for goal details.  Goals not met.  Barriers to achieving goals:   discharge from facility.     Therapy recommendation(s):    May benefit from the WEL program for continued endurance training with vitals monitoring.

## 2017-11-13 ENCOUNTER — CARE COORDINATION (OUTPATIENT)
Dept: CARDIOLOGY | Facility: CLINIC | Age: 67
End: 2017-11-13

## 2017-11-13 ENCOUNTER — OFFICE VISIT (OUTPATIENT)
Dept: CARDIOLOGY | Facility: CLINIC | Age: 67
End: 2017-11-13
Payer: COMMERCIAL

## 2017-11-13 VITALS
WEIGHT: 200 LBS | HEART RATE: 60 BPM | OXYGEN SATURATION: 95 % | DIASTOLIC BLOOD PRESSURE: 88 MMHG | SYSTOLIC BLOOD PRESSURE: 158 MMHG | BODY MASS INDEX: 31.32 KG/M2

## 2017-11-13 DIAGNOSIS — I50.21 ACUTE SYSTOLIC CONGESTIVE HEART FAILURE (H): ICD-10-CM

## 2017-11-13 DIAGNOSIS — I16.9 HYPERTENSIVE CRISIS: ICD-10-CM

## 2017-11-13 LAB
ANION GAP SERPL CALCULATED.3IONS-SCNC: 11.3 MMOL/L (ref 6–17)
BUN SERPL-MCNC: 18 MG/DL (ref 7–30)
CALCIUM SERPL-MCNC: 9.5 MG/DL (ref 8.5–10.5)
CHLORIDE SERPL-SCNC: 99 MMOL/L (ref 98–107)
CO2 SERPL-SCNC: 27 MMOL/L (ref 23–29)
CREAT SERPL-MCNC: 1.23 MG/DL (ref 0.7–1.3)
GFR SERPL CREATININE-BSD FRML MDRD: 59 ML/MIN/1.7M2
GLUCOSE SERPL-MCNC: 115 MG/DL (ref 70–105)
POTASSIUM SERPL-SCNC: 4.3 MMOL/L (ref 3.5–5.1)
SODIUM SERPL-SCNC: 133 MMOL/L (ref 136–145)

## 2017-11-13 PROCEDURE — 36415 COLL VENOUS BLD VENIPUNCTURE: CPT | Performed by: NURSE PRACTITIONER

## 2017-11-13 PROCEDURE — 80048 BASIC METABOLIC PNL TOTAL CA: CPT | Performed by: NURSE PRACTITIONER

## 2017-11-13 PROCEDURE — 99214 OFFICE O/P EST MOD 30 MIN: CPT | Performed by: PHYSICIAN ASSISTANT

## 2017-11-13 RX ORDER — CARVEDILOL 25 MG/1
50 TABLET ORAL 2 TIMES DAILY WITH MEALS
Qty: 120 TABLET | Refills: 3 | Status: SHIPPED | OUTPATIENT
Start: 2017-11-13 | End: 2018-04-19

## 2017-11-13 RX ORDER — LOSARTAN POTASSIUM 25 MG/1
12.5 TABLET ORAL DAILY
Qty: 30 TABLET | Refills: 11 | Status: ON HOLD | OUTPATIENT
Start: 2017-11-13 | End: 2017-12-17

## 2017-11-13 NOTE — MR AVS SNAPSHOT
After Visit Summary   11/13/2017    Aubrey Freeman    MRN: [id number]           Patient Information     Date Of Birth          1950        Visit Information        Provider Department      11/13/2017 3:10 PM More, Roxanna Salter PA-C [facility]        Today's Diagnoses     Hypertensive crisis        Acute systolic congestive heart failure (H)          Care Instructions    Call CORE nurse for any questions or concerns:  [phone]   *If you have concerns after hours, please call [phone], option 2 to speak with on call Cardiologist.    1. Medication changes from today: Increase Coreg/ carvedilol to 2 tablets but twice a day.    If on Thursday your blood pressure is still high >140/ 90 please start losartan 12.5 mg once a day.  This will be 1/2 tablet once a day.       2. Weigh yourself daily and write it down.     3. Call CORE nurse if your weight is up more than 2 pounds in one day or 5 pounds in one week.     4. Call CORE nurse if you feel more short of breath, have more abdominal bloating, or leg swelling.     5. Continue low sodium diet (less than 2000 mg daily). If you eat less salt, you will retain less fluid.     6. Alcohol can weaken your heart further. You should avoid alcohol or limit its use to special times, such as a holiday or birthday.      7. Do NOT take Aleve or ibuprofen without talking to your doctor first.      8. Lab Results:   Component      Latest Ref Rng & Units 11/13/2017   Sodium      136 - 145 mmol/L 133 (L)   Potassium      3.5 - 5.1 mmol/L 4.3   Chloride      98 - 107 mmol/L 99   Carbon Dioxide      23 - 29 mmol/L 27   Anion Gap      6 - 17 mmol/L 11.3   Glucose      70 - 105 mg/dL 115 (H)   Urea Nitrogen      7 - 30 mg/dL 18   Creatinine      0.70 - 1.30 mg/dL 1.23   GFR Estimate      >60 mL/min/1.7m2 59 (L)   GFR Estimate If Black      >60 mL/min/1.7m2 71   Calcium      8.5 - 10.5 mg/dL 9.5        [facility]:  Cardiomyopathy, Optimization, Rehabilitation, Education  The [facility] Clinic is a heart failure specialty clinic within the [facility] where you will work with specialized nurse practitioners, physician assistants, doctors, and registered nurses. They are dedicated to helping patients with heart failure to carefully adjust medications, receive education, and learn who and when to call if symptoms develop. They specialize in helping you better understand your condition, slow the progression of your disease, improve the length and quality of your life, help you detect future heart problems before they become life threatening, and avoid hospitalizations.                Follow-ups after your visit        Additional Services     Follow-Up with [facility]ORE Clinic - OSCAR visit                 Your next 10 appointments already scheduled     Jan 19, 2018  9:45 AM CST   Return Visit with Dave Miranda MD   [facility] ([facility])    [address] [place] [id number] [phone]              Future tests that were ordered for you today     Open Future Orders        Priority Expected Expires Ordered    Follow-Up with [facility] Clinic - OSCAR visit Routine 12/4/2017 11/13/2018 11/13/2017    Basic metabolic panel Routine 12/4/2017 11/13/2018 11/13/2017            Who to contact     If you have questions or need follow up information about today's clinic visit or your schedule please contact [facility] directly at [phone].  Normal or non-critical lab and imaging results will be communicated to you by MyChart, letter or phone within 4 business days after the clinic has received the results. If you do not hear from us within 7 days, please contact the clinic through MyChart or phone. If you have a critical or abnormal lab result, we will notify you by phone as soon as possible.  Submit refill requests through #[email or web address] or call  "your pharmacy and they will forward the refill request to us. Please allow 3 business days for your refill to be completed.          Additional Information About Your Visit        [employer] Information     [employer] lets you send messages to your doctor, view your test results, renew your prescriptions, schedule appointments and more. To sign up, go to [email or web address] . Click on \"Log in\" on the left side of the screen, which will take you to the Welcome page. Then click on \"Sign up Now\" on the right side of the page.     You will be asked to enter the access code listed below, as well as some personal information. Please follow the directions to create your username and password.     Your access code is: [email or web address]  Expires: 2018  6:16 PM     Your access code will  in 90 days. If you need help or a new code, please call your [place] clinic or [phone].        Care EveryWhere ID     This is your Care EveryWhere ID. This could be used by other organizations to access your [place] medical records  [id number]        Your Vitals Were     Pulse Pulse Oximetry BMI (Body Mass Index)             60 95% 31.32 kg/m2          Blood Pressure from Last 3 Encounters:   17 158/88   11/10/17 (!) 157/102   10/31/17 (!) 149/94    Weight from Last 3 Encounters:   17 90.7 kg (200 lb)   11/10/17 86.1 kg (189 lb 14.4 oz)   10/30/17 86.1 kg (189 lb 14.4 oz)              We Performed the Following     Follow-Up with Cardiac Advanced Practice Provider          Today's Medication Changes          These changes are accurate as of: 17  4:06 PM.  If you have any questions, ask your nurse or doctor.               Start taking these medicines.        Dose/Directions    losartan 25 MG tablet   Commonly known as:  COZAAR   Used for:  Hypertensive crisis   Started by:  Roxanna Kelly PA-C        Dose:  12.5 mg   Take 0.5 tablets (12.5 mg) by mouth daily   Quantity:  30 tablet   Refills:  11       "   These medicines have changed or have updated prescriptions.        Dose/Directions    carvedilol 25 MG tablet   Commonly known as:  COREG   This may have changed:  how much to take   Used for:  Hypertensive crisis   Changed by:  Roxanna Kelly PA-C        Dose:  50 mg   Take 2 tablets (50 mg) by mouth 2 times daily (with meals)   Quantity:  120 tablet   Refills:  3            Where to get your medicines      These medications were sent to [facility] [address] [place] [address]  [address] [facility] [address]     Phone:  [phone]     carvedilol 25 MG tablet    losartan 25 MG tablet                Primary Care Provider Office Phone # Fax #    Seamus Alba [phone]       [facility] [address] [facility] [address]        Equal Access to Services     [facility]EPTI [facility] : Hadii osman waddell hadasho Soomaali, waaxda luqadaha, qaybta kaalmada adeegyada, enrico garcia . So [place] -[phone].    ATENCIÓN: Si habla español, tiene a rodriguez disposición servicios gratuitos de asistencia lingüística. Amado al [phone].    We comply with applicable federal civil rights laws and [place] laws. We do not discriminate on the basis of race, color, national origin, age, disability, sex, sexual orientation, or gender identity.            Thank you!     Thank you for choosing [facility]  for your care. Our goal is always to provide you with excellent care. Hearing back from our patients is one way we can continue to improve our services. Please take a few minutes to complete the written survey that you may receive in the mail after your visit with us. Thank you!             Your Updated Medication List - Protect others around you: Learn how to safely use, store and throw away your medicines at www.disposemymeds.org.          This list is accurate as of: 11/13/17  4:06 PM.  Always use  your most recent med list.                   Brand Name Dispense Instructions for use Diagnosis    amLODIPine 5 MG tablet    NORVASC    60 tablet    Take 1 tablet (5 mg) by mouth 2 times daily    Hypertension goal BP (blood pressure) < 130/80       aspirin 81 MG EC tablet     100 tablet    Take 1 tablet (81 mg) by mouth daily    Acute systolic congestive heart failure (H)       carvedilol 25 MG tablet    COREG    120 tablet    Take 2 tablets (50 mg) by mouth 2 times daily (with meals)    Hypertensive crisis       CO Q 10 PO      1 tab daily        furosemide 20 MG tablet    LASIX    30 tablet    Take 1 tablet (20 mg) by mouth 2 times daily    Acute pulmonary edema (H)       hydrALAZINE 50 MG tablet    APRESOLINE    90 tablet    Take 1 tablet (50 mg) by mouth 3 times daily    Hypertensive crisis       latanoprost 0.005 % ophthalmic solution    XALATAN     Place 1 drop Into the left eye At Bedtime        LEVETIRACETAM PO      Keppra 500 mg twice daily        losartan 25 MG tablet    COZAAR    30 tablet    Take 0.5 tablets (12.5 mg) by mouth daily    Hypertensive crisis       LYSINE      1 tab daily        traZODone 50 MG tablet    DESYREL     Take 25 mg by mouth At Bedtime        ZINC ACETATE      1 tablet daily
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
## 2017-11-13 NOTE — NURSING NOTE
The After Visit Summary was reviewed with the patient following their office visit with Roxanna Kelly PA-C. Patient was educated about any medication changes, the importance of following a low sodium diet, importance of recording daily weights, and when to call CORE clinic. Patient verbalized understanding of the information and agrees to call with any questions or concerns.     Labs: Lab results from today were reviewed with the patient during the office visit. A copy of the results were provided on the After Visit Summary.     Return appointment: Patient was scheduled for 3-week follow up with Roxanna Kelly PA-C on 12/4/17 with BMP prior.     Medication changes reviewed with pt and pt's wife.        Haydee Castanon RN  [facility] Clinic Nurse  [phone]

## 2017-11-13 NOTE — PROGRESS NOTES
Called patient and left voicemail to discuss any post hospital d/c questions he may have, determine any new symptoms,  review medication changes, and confirm f/u appts. RN confirmed with patient that he/she has an apt scheduled with lab and Roxanna Kelly on 11/13/17.  Instructed patient to take daily weights and call clinic for a weight gain of 2 lbs overnight or 5 lbs in a week. Patient advised to call clinic with any cardiac related questions or concerns prior to his appt.
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
## 2017-11-13 NOTE — PATIENT INSTRUCTIONS
Call CORE nurse for any questions or concerns:  [phone]   *If you have concerns after hours, please call [phone], option 2 to speak with on call Cardiologist.    1. Medication changes from today: Increase Coreg/ carvedilol to 2 tablets but twice a day.    If on Thursday your blood pressure is still high >140/ 90 please start losartan 12.5 mg once a day.  This will be 1/2 tablet once a day.       2. Weigh yourself daily and write it down.     3. Call CORE nurse if your weight is up more than 2 pounds in one day or 5 pounds in one week.     4. Call CORE nurse if you feel more short of breath, have more abdominal bloating, or leg swelling.     5. Continue low sodium diet (less than 2000 mg daily). If you eat less salt, you will retain less fluid.     6. Alcohol can weaken your heart further. You should avoid alcohol or limit its use to special times, such as a holiday or birthday.      7. Do NOT take Aleve or ibuprofen without talking to your doctor first.      8. Lab Results:   Component      Latest Ref Rng & Units 11/13/2017   Sodium      136 - 145 mmol/L 133 (L)   Potassium      3.5 - 5.1 mmol/L 4.3   Chloride      98 - 107 mmol/L 99   Carbon Dioxide      23 - 29 mmol/L 27   Anion Gap      6 - 17 mmol/L 11.3   Glucose      70 - 105 mg/dL 115 (H)   Urea Nitrogen      7 - 30 mg/dL 18   Creatinine      0.70 - 1.30 mg/dL 1.23   GFR Estimate      >60 mL/min/1.7m2 59 (L)   GFR Estimate If Black      >60 mL/min/1.7m2 71   Calcium      8.5 - 10.5 mg/dL 9.5        [facility]: Cardiomyopathy, Optimization, Rehabilitation, Education  The [facility] is a heart failure specialty clinic within the [facility] where you will work with specialized nurse practitioners, physician assistants, doctors, and registered nurses. They are dedicated to helping patients with heart failure to carefully adjust medications, receive education, and learn who and when to call if symptoms develop. They specialize in helping you  better understand your condition, slow the progression of your disease, improve the length and quality of your life, help you detect future heart problems before they become life threatening, and avoid hospitalizations.
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
## 2017-11-21 ENCOUNTER — CARE COORDINATION (OUTPATIENT)
Dept: CARDIOLOGY | Facility: CLINIC | Age: 67
End: 2017-11-21

## 2017-11-21 DIAGNOSIS — I50.31 ACUTE DIASTOLIC HEART FAILURE (H): Primary | ICD-10-CM

## 2017-11-21 NOTE — PROGRESS NOTES
Pt w/ HFpEF and HTN w/ 2 recent admits for HTNsive urgency and fluid overload.    Pt's wife calls. She states pt has had 3-4 nights of orthopnea and increased FISCHER, dyspnea when talking a lot. They thought about going to ED this wknd, but instead called on call doctor-not clear of cardiology or PCP-and were reassured and asked to call us to f/u.     Seen by Roxanna More PAC 11/13, euvolemic to slightly hypervolemic, cont'd on lasix 20mg BID at home wt 194.5#. Goal wt 190-195#. Wt yesterday 195.0#, today 195.5#.     She correctly told me he's taking lasix 20mg BID, hydralazine 50mg TID, amlodipine 5mg BID, carvedilol 50mg BID (which was increased last OV). Unfortunately, they have not picked up, nor started, the 12.5mg daily losartan that Roxanna started 11/13. BP in last few days has ranged from 135/85, 143/92, 159/80. I instructed her to  losartan and have pt start med today.    Will route to Power Silver CNP in Roxanna and Dr. Miranda's absence.         Nina Larsen [facility] RN 11:40 AM 11/21/17  [phone]

## 2017-11-21 NOTE — PROGRESS NOTES
Reviewed below note w/ Roxanna More PAC. She recs to start losartan; increase lasix to 40mg BID through 11/19, then return to 20mg BID on 11/20 and CORE RN will call for update. I told wife that if pt's wt goes  <190# he may also decrease to 20mg BID at that time. If he has resp distress, SOB at rest or other urgent concerns, call 911. Wife verbalized instructions and agreed to plan.     Reminder sent to [facility] RN board to call wife 11/27.    She asked if amlodipine, asa and lasix are safe to take together. I told her combination is safe.    Nina Larsen [facility] RN 12:54 PM 11/21/17  [phone]

## 2017-11-27 NOTE — PROGRESS NOTES
pls d/c lasix and start bumex 1 mg bid.  Labs are stable. Bmp next week as scheduled is fine. Roxanna Kelly PA-C 11/27/2017 5:02 PM

## 2017-11-27 NOTE — PROGRESS NOTES
Spoke w/ pt's wife. Wt today 200.5#. Up 5# from when we spoke last wk and lasix was increased to 40mg BID through today; now back to 20mg BID.  Conts to have FISCHER, not better nor worse from last wk. Pt's wife clarifies that he maybe doesn't have orthopnea, but rather has dyspnea getting ready for and in and out of bed. She denies dietary indiscretion over Thanksgiving, says he's maintained his salt restriction below 2gm.     Per Roxanna goal wt 190-195#.     She wonders if deconditioning plays a role in his dyspnea. We reviewed that it could, but I'm also concerned about wt gain. Will send to Roxanna More PAC for review. BMP and OV w/ Roxanna 12/4.      Nina Larsen [facility] RN 4:27 PM 11/27/17  [phone]

## 2017-11-28 RX ORDER — BUMETANIDE 1 MG/1
1 TABLET ORAL 2 TIMES DAILY
Qty: 60 TABLET | Refills: 11 | Status: SHIPPED | OUTPATIENT
Start: 2017-11-28 | End: 2017-12-04

## 2017-11-30 NOTE — PROGRESS NOTES
"Received message from patient's wife, returning Afshan's call. I contacted patient, he said he is feeling fine since starting the Bumex. He handed the phone to his wife, \"she takes care of my medicines.\" Wife, Kristen said patient is tolerating Bumex just fine. Wt yesterday was 201.5 #, today wt was 199.5 # His breathing has improved.Wife stated he still has some bloating around his waist but overall he is feeling better and responding well to the diuretic. He continues to follow a low Na+ diet, no change in fluid intake. Will continue to monitor. I encouraged patient's wife to call if she has any questions. Pt is scheduled to see DONALD Hairston on 12/4/17.  Lynn Kirkland RN 11:41 AM 11/30/17      "

## 2017-12-04 ENCOUNTER — OFFICE VISIT (OUTPATIENT)
Dept: CARDIOLOGY | Facility: CLINIC | Age: 67
End: 2017-12-04
Payer: COMMERCIAL

## 2017-12-04 VITALS
SYSTOLIC BLOOD PRESSURE: 132 MMHG | BODY MASS INDEX: 32.33 KG/M2 | DIASTOLIC BLOOD PRESSURE: 80 MMHG | HEIGHT: 67 IN | HEART RATE: 64 BPM | WEIGHT: 206 LBS

## 2017-12-04 DIAGNOSIS — I10 HYPERTENSION GOAL BP (BLOOD PRESSURE) < 130/80: ICD-10-CM

## 2017-12-04 DIAGNOSIS — I50.31 ACUTE DIASTOLIC HEART FAILURE (H): ICD-10-CM

## 2017-12-04 DIAGNOSIS — I50.30 (HFPEF) HEART FAILURE WITH PRESERVED EJECTION FRACTION (H): Primary | ICD-10-CM

## 2017-12-04 DIAGNOSIS — I50.21 ACUTE SYSTOLIC CONGESTIVE HEART FAILURE (H): ICD-10-CM

## 2017-12-04 LAB
ANION GAP SERPL CALCULATED.3IONS-SCNC: 10.6 MMOL/L (ref 6–17)
BUN SERPL-MCNC: 21 MG/DL (ref 7–30)
CALCIUM SERPL-MCNC: 9.4 MG/DL (ref 8.5–10.5)
CHLORIDE SERPL-SCNC: 95 MMOL/L (ref 98–107)
CO2 SERPL-SCNC: 27 MMOL/L (ref 23–29)
CREAT SERPL-MCNC: 1.08 MG/DL (ref 0.7–1.3)
GFR SERPL CREATININE-BSD FRML MDRD: 69 ML/MIN/1.7M2
GLUCOSE SERPL-MCNC: 108 MG/DL (ref 70–105)
POTASSIUM SERPL-SCNC: 3.6 MMOL/L (ref 3.5–5.1)
SODIUM SERPL-SCNC: 129 MMOL/L (ref 136–145)

## 2017-12-04 PROCEDURE — 80048 BASIC METABOLIC PNL TOTAL CA: CPT | Performed by: PHYSICIAN ASSISTANT

## 2017-12-04 PROCEDURE — 99214 OFFICE O/P EST MOD 30 MIN: CPT | Performed by: PHYSICIAN ASSISTANT

## 2017-12-04 PROCEDURE — 99207 ZZC NO CHARGE LOS: CPT | Performed by: PHYSICIAN ASSISTANT

## 2017-12-04 PROCEDURE — 36415 COLL VENOUS BLD VENIPUNCTURE: CPT | Performed by: PHYSICIAN ASSISTANT

## 2017-12-04 RX ORDER — POTASSIUM CHLORIDE 1500 MG/1
20 TABLET, EXTENDED RELEASE ORAL DAILY
Qty: 30 TABLET | Refills: 3 | Status: SHIPPED | OUTPATIENT
Start: 2017-12-04 | End: 2017-12-21 | Stop reason: DRUGHIGH

## 2017-12-04 RX ORDER — BUMETANIDE 1 MG/1
TABLET ORAL
Qty: 60 TABLET | Refills: 11 | Status: ON HOLD | COMMUNITY
Start: 2017-12-04 | End: 2017-12-11

## 2017-12-04 NOTE — MR AVS SNAPSHOT
After Visit Summary   12/4/2017    Aubrey Freeman    MRN: [id number]           Patient Information     Date Of Birth          1950        Visit Information        Provider Department      12/4/2017 8:40 AM Hope Che PA-C [facility]   [place]        Today's Diagnoses     (HFpEF) heart failure with preserved ejection fraction (H)    -  1    Acute diastolic heart failure (H)          Care Instructions    Call [facility] nurse for any questions or concerns:  [phone]   *If you have concerns after hours, please call [phone], option 2 to speak with on call Cardiologist.    1. Medication changes from today:  Increase Bumex to two tablets in the morning and 1 tablet in the afternoon. Start taking the potassium supplement, 1 tablet every daily.      2. Continue to weigh yourself daily and write it down. Please bring your weight chart with you to your next visit.      3. Call [facility] nurse if your weight is up more than 2 pounds in one day or 5 pounds in one week.     4. Call [facility] nurse if you feel more short of breath, have more abdominal bloating, or leg swelling.     5. Continue low sodium diet (less than 2000 mg daily). If you eat less salt, you will retain less fluid.     6. Alcohol can weaken your heart further. You should avoid alcohol or limit its use to special times, such as a holiday or birthday.      7. Do NOT take Aleve or ibuprofen without talking to your doctor first.      8. Lab Results:   Component      Latest Ref Rng & Units 12/4/2017   Sodium      136 - 145 mmol/L 129 (L)   Potassium      3.5 - 5.1 mmol/L 3.6   Chloride      98 - 107 mmol/L 95 (L)   Carbon Dioxide      23 - 29 mmol/L 27   Anion Gap      6 - 17 mmol/L 10.6   Glucose      70 - 105 mg/dL 108 (H)   Urea Nitrogen      7 - 30 mg/dL 21   Creatinine      0.70 - 1.30 mg/dL 1.08   GFR Estimate      >60 mL/min/1.7m2 69   GFR Estimate If Black      >60 mL/min/1.7m2 83   Calcium       8.5 - 10.5 mg/dL 9.4        [facility]: Cardiomyopathy, Optimization, Rehabilitation, Education  The [facility] is a heart failure specialty clinic within the [facility] where you will work with specialized nurse practitioners, physician assistants, doctors, and registered nurses. They are dedicated to helping patients with heart failure to carefully adjust medications, receive education, and learn who and when to call if symptoms develop. They specialize in helping you better understand your condition, slow the progression of your disease, improve the length and quality of your life, help you detect future heart problems before they become life threatening, and avoid hospitalizations.              Follow-ups after your visit        Additional Services     Follow-Up with [facility]                 Your next 10 appointments already scheduled     Jan 19, 2018  9:45 AM CST   Return Visit with Dave Miranda MD   [facility] ([facility])    [address] [place] [id number] [phone]              Future tests that were ordered for you today     Open Future Orders        Priority Expected Expires Ordered    Basic metabolic panel Routine 12/11/2017 12/4/2018 12/4/2017    Follow-Up with [facility] Routine 12/11/2017 12/4/2018 12/4/2017            Who to contact     If you have questions or need follow up information about today's clinic visit or your schedule please contact [facility] directly at [phone].  Normal or non-critical lab and imaging results will be communicated to you by MyChart, letter or phone within 4 business days after the clinic has received the results. If you do not hear from us within 7 days, please contact the clinic through MyChart or phone. If you have a critical or abnormal lab result, we will notify you by phone as soon as possible.  Submit refill requests through  "[employer]lg[employer] or call your pharmacy and they will forward the refill request to us. Please allow 3 business days for your refill to be completed.          Additional Information About Your Visit        MyChart Information     Seesm[employer]art lets you send messages to your doctor, view your test results, renew your prescriptions, schedule appointments and more. To sign up, go to [email or web address] . Click on \"Log in\" on the left side of the screen, which will take you to the Welcome page. Then click on \"Sign up Now\" on the right side of the page.     You will be asked to enter the access code listed below, as well as some personal information. Please follow the directions to create your username and password.     Your access code is: [email or web address]  Expires: 2018  6:16 PM     Your access code will  in 90 days. If you need help or a new code, please call your [place] clinic or [phone].        Care EveryWhere ID     This is your Care EveryWhere ID. This could be used by other organizations to access your [place] medical records  [id number]        Your Vitals Were     Pulse Height BMI (Body Mass Index)             64 1.702 m (5' 7\") 32.26 kg/m2          Blood Pressure from Last 3 Encounters:   17 132/80   17 158/88   11/10/17 (!) 157/102    Weight from Last 3 Encounters:   17 93.4 kg (206 lb)   17 90.7 kg (200 lb)   11/10/17 86.1 kg (189 lb 14.4 oz)                 Today's Medication Changes          These changes are accurate as of: 17  9:23 AM.  If you have any questions, ask your nurse or doctor.               Start taking these medicines.        Dose/Directions    potassium chloride SA 20 MEQ CR tablet   Commonly known as:  KLOR-CON   Used for:  (HFpEF) heart failure with preserved ejection fraction (H)   Started by:  Hope Che PA-C        Dose:  20 mEq   Take 1 tablet (20 mEq) by mouth daily   Quantity:  30 tablet   Refills:  3         These medicines have " changed or have updated prescriptions.        Dose/Directions    bumetanide 1 MG tablet   Commonly known as:  BUMEX   This may have changed:    - how much to take  - how to take this  - when to take this  - additional instructions   Used for:  Acute diastolic heart failure (H)   Changed by:  Hope Che PA-C        Take 2 tablets (2 mg) in the morning and 1 tablet (1 mg) in the evening.   Quantity:  60 tablet   Refills:  11            Where to get your medicines      These medications were sent to [facility] [address] [place] [address] [address] [facility]  [address] [facility] [address]     Phone:  [phone]     potassium chloride SA 20 MEQ CR tablet                Primary Care Provider Office Phone # Fax #    Seamus Carroll [phone]       [facility] [address] [facility] [address]        Equal Access to Services     CLOVIS MCGEE : Hadii osman ku hadasho Soomaali, waaxda luqadaha, qaybta kaalmada adeegyada, enrico pearson hayabby garcia . So [facility] [phone].    ATENCIÓN: Si habla español, tiene a rodriguez disposición servicios gratuitos de asistencia lingüística. Amado al [phone].    We comply with applicable federal civil rights laws and [place] laws. We do not discriminate on the basis of race, color, national origin, age, disability, sex, sexual orientation, or gender identity.            Thank you!     Thank you for choosing [facility]  for your care. Our goal is always to provide you with excellent care. Hearing back from our patients is one way we can continue to improve our services. Please take a few minutes to complete the written survey that you may receive in the mail after your visit with us. Thank you!             Your Updated Medication List - Protect others around you: Learn how to safely use, store and throw away your medicines at www.disposemymeds.org.          This  list is accurate as of: 12/4/17  9:23 AM.  Always use your most recent med list.                   Brand Name Dispense Instructions for use Diagnosis    amLODIPine 5 MG tablet    NORVASC    60 tablet    Take 1 tablet (5 mg) by mouth 2 times daily    Hypertension goal BP (blood pressure) < 130/80       aspirin 81 MG EC tablet     100 tablet    Take 1 tablet (81 mg) by mouth daily    Acute systolic congestive heart failure (H)       bumetanide 1 MG tablet    BUMEX    60 tablet    Take 2 tablets (2 mg) in the morning and 1 tablet (1 mg) in the evening.    Acute diastolic heart failure (H)       carvedilol 25 MG tablet    COREG    120 tablet    Take 2 tablets (50 mg) by mouth 2 times daily (with meals)    Hypertensive crisis       CO Q 10 PO      1 tab daily        hydrALAZINE 50 MG tablet    APRESOLINE    90 tablet    Take 1 tablet (50 mg) by mouth 3 times daily    Hypertensive crisis       latanoprost 0.005 % ophthalmic solution    XALATAN     Place 1 drop Into the left eye At Bedtime        LEVETIRACETAM PO      Keppra 500 mg twice daily        losartan 25 MG tablet    COZAAR    30 tablet    Take 0.5 tablets (12.5 mg) by mouth daily    Hypertensive crisis       LYSINE      1 tab daily        potassium chloride SA 20 MEQ CR tablet    KLOR-CON    30 tablet    Take 1 tablet (20 mEq) by mouth daily    (HFpEF) heart failure with preserved ejection fraction (H)       traZODone 50 MG tablet    DESYREL     Take 25 mg by mouth At Bedtime        ZINC ACETATE      1 tablet daily
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
## 2017-12-04 NOTE — MR AVS SNAPSHOT
After Visit Summary   12/4/2017    Aubrey Freeman    MRN: [id number]           Patient Information     Date Of Birth          1950        Today's Diagnoses     Acute systolic congestive heart failure (H)           Follow-ups after your visit        Your next 10 appointments already scheduled     Dec 11, 2017 12:50 PM CST   LAB with MIRANDA LAB   [facility] ([facility])    [address]  [place] [address] [phone]           Please do not eat 10-12 hours before your appointment if you are coming in fasting for labs on lipids, cholesterol, or glucose (sugar). This does not apply to pregnant women. Water, hot tea and black coffee (with nothing added) are okay. Do not drink other fluids, diet soda or chew gum.            Dec 11, 2017  1:50 PM CST   Core Return with Hope Che PA-C   [facility] ([facility])    [address]  [place] [address] [phone]            Jan 19, 2018  9:45 AM CST   Return Visit with Dave Miranda MD   [facility] ([facility])    [address]  [place] [address] [phone]              Who to contact     If you have questions or need follow up information about today's clinic visit or your schedule please contact [facility] directly at [phone].  Normal or non-critical lab and imaging results will be communicated to you by MyChart, letter or phone within 4 business days after the clinic has received the results. If you do not hear from us within 7 days, please contact the clinic through MyChart or phone. If you have a critical or abnormal lab result, we will notify you by phone as soon as possible.  Submit refill requests through [facility] or call your pharmacy and they will forward the refill request to  "us. Please allow 3 business days for your refill to be completed.          Additional Information About Your Visit        MyChart Information     [employer] lets you send messages to your doctor, view your test results, renew your prescriptions, schedule appointments and more. To sign up, go to [email or web address] . Click on \"Log in\" on the left side of the screen, which will take you to the Welcome page. Then click on \"Sign up Now\" on the right side of the page.     You will be asked to enter the access code listed below, as well as some personal information. Please follow the directions to create your username and password.     Your access code is: [email or web address]  Expires: 2018  6:16 PM     Your access code will  in 90 days. If you need help or a new code, please call your [place] clinic or [phone].        Care EveryWhere ID     This is your Care EveryWhere ID. This could be used by other organizations to access your [place] medical records  [id number]         Blood Pressure from Last 3 Encounters:   17 132/80   17 158/88   11/10/17 (!) 157/102    Weight from Last 3 Encounters:   17 93.4 kg (206 lb)   17 90.7 kg (200 lb)   11/10/17 86.1 kg (189 lb 14.4 oz)              We Performed the Following     Follow-Up with [facility] - [facility] visit          Today's Medication Changes          These changes are accurate as of: 17 11:59 PM.  If you have any questions, ask your nurse or doctor.               Start taking these medicines.        Dose/Directions    potassium chloride SA 20 MEQ CR tablet   Commonly known as:  KLOR-CON   Used for:  (HFpEF) heart failure with preserved ejection fraction (H)   Started by:  Hope Che PA-C        Dose:  20 mEq   Take 1 tablet (20 mEq) by mouth daily   Quantity:  30 tablet   Refills:  3         These medicines have changed or have updated prescriptions.        Dose/Directions    bumetanide 1 MG tablet   Commonly known as:  " BUMEX   This may have changed:    - how much to take  - how to take this  - when to take this  - additional instructions   Used for:  Acute diastolic heart failure (H)   Changed by:  Hope Che PA-C        Take 2 tablets (2 mg) in the morning and 1 tablet (1 mg) in the evening.   Quantity:  60 tablet   Refills:  11            Where to get your medicines      These medications were sent to [facility] [address] [place] [address] [address]  [address] [facility] [place] [address]     Phone:  [phone]     potassium chloride SA 20 MEQ CR tablet                Primary Care Provider Office Phone # Fax #    Seamus Carroll [phone]       [facility] [address] [facility] [address]        Equal Access to Services     CLOVIS MCGEE : Hadii osman waddell hadasho Soomaali, waaxda luqadaha, qaybta kaalmada adeegyada, enrico garcia . So [facility] [phone].    ATENCIÓN: Si habla español, tiene a rodriguez disposición servicios gratuitos de asistencia lingüística. Amado al [phone].    We comply with applicable federal civil rights laws and [place] laws. We do not discriminate on the basis of race, color, national origin, age, disability, sex, sexual orientation, or gender identity.            Thank you!     Thank you for choosing [facility]  for your care. Our goal is always to provide you with excellent care. Hearing back from our patients is one way we can continue to improve our services. Please take a few minutes to complete the written survey that you may receive in the mail after your visit with us. Thank you!             Your Updated Medication List - Protect others around you: Learn how to safely use, store and throw away your medicines at www.disposemymeds.org.          This list is accurate as of: 12/4/17 11:59 PM.  Always use your most recent med list.                   Brand Name  Dispense Instructions for use Diagnosis    amLODIPine 5 MG tablet    NORVASC    60 tablet    Take 1 tablet (5 mg) by mouth 2 times daily    Hypertension goal BP (blood pressure) < 130/80       aspirin 81 MG EC tablet     100 tablet    Take 1 tablet (81 mg) by mouth daily    Acute systolic congestive heart failure (H)       bumetanide 1 MG tablet    BUMEX    60 tablet    Take 2 tablets (2 mg) in the morning and 1 tablet (1 mg) in the evening.    Acute diastolic heart failure (H)       carvedilol 25 MG tablet    COREG    120 tablet    Take 2 tablets (50 mg) by mouth 2 times daily (with meals)    Hypertensive crisis       CO Q 10 PO      1 tab daily        hydrALAZINE 50 MG tablet    APRESOLINE    90 tablet    Take 1 tablet (50 mg) by mouth 3 times daily    Hypertensive crisis       latanoprost 0.005 % ophthalmic solution    XALATAN     Place 1 drop Into the left eye At Bedtime        LEVETIRACETAM PO      Keppra 500 mg twice daily        losartan 25 MG tablet    COZAAR    30 tablet    Take 0.5 tablets (12.5 mg) by mouth daily    Hypertensive crisis       LYSINE      1 tab daily        potassium chloride SA 20 MEQ CR tablet    KLOR-CON    30 tablet    Take 1 tablet (20 mEq) by mouth daily    (HFpEF) heart failure with preserved ejection fraction (H)       traZODone 50 MG tablet    DESYREL     Take 25 mg by mouth At Bedtime        ZINC ACETATE      1 tablet daily
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
## 2017-12-04 NOTE — PATIENT INSTRUCTIONS
Call [facility] nurse for any questions or concerns:  [phone]   *If you have concerns after hours, please call [phone], option 2 to speak with on call Cardiologist.    1. Medication changes from today:  Increase Bumex to two tablets in the morning and 1 tablet in the afternoon. Start taking the potassium supplement, 1 tablet every daily.      2. Continue to weigh yourself daily and write it down. Please bring your weight chart with you to your next visit.      3. Call [facility] nurse if your weight is up more than 2 pounds in one day or 5 pounds in one week.     4. Call [facility] nurse if you feel more short of breath, have more abdominal bloating, or leg swelling.     5. Continue low sodium diet (less than 2000 mg daily). If you eat less salt, you will retain less fluid.     6. Alcohol can weaken your heart further. You should avoid alcohol or limit its use to special times, such as a holiday or birthday.      7. Do NOT take Aleve or ibuprofen without talking to your doctor first.      8. Lab Results:   Component      Latest Ref Rng & Units 12/4/2017   Sodium      136 - 145 mmol/L 129 (L)   Potassium      3.5 - 5.1 mmol/L 3.6   Chloride      98 - 107 mmol/L 95 (L)   Carbon Dioxide      23 - 29 mmol/L 27   Anion Gap      6 - 17 mmol/L 10.6   Glucose      70 - 105 mg/dL 108 (H)   Urea Nitrogen      7 - 30 mg/dL 21   Creatinine      0.70 - 1.30 mg/dL 1.08   GFR Estimate      >60 mL/min/1.7m2 69   GFR Estimate If Black      >60 mL/min/1.7m2 83   Calcium      8.5 - 10.5 mg/dL 9.4        [facility]: Cardiomyopathy, Optimization, Rehabilitation, Education  The [facility] is a heart failure specialty clinic within the [facility] where you will work with specialized nurse practitioners, physician assistants, doctors, and registered nurses. They are dedicated to helping patients with heart failure to carefully adjust medications, receive education, and learn who and when to call if symptoms develop. They specialize in  helping you better understand your condition, slow the progression of your disease, improve the length and quality of your life, help you detect future heart problems before they become life threatening, and avoid hospitalizations.

## 2017-12-06 ENCOUNTER — CARE COORDINATION (OUTPATIENT)
Dept: CARDIOLOGY | Facility: CLINIC | Age: 67
End: 2017-12-06

## 2017-12-06 DIAGNOSIS — I10 HYPERTENSION GOAL BP (BLOOD PRESSURE) < 130/80: ICD-10-CM

## 2017-12-06 DIAGNOSIS — I16.9 HYPERTENSIVE CRISIS: ICD-10-CM

## 2017-12-06 NOTE — PROGRESS NOTES
Received call from pt's wife, Kristen, who reports that pt's wt today is 209#, which is up 206# on 12/4 when pt was seen in clinic. Kristen confirms that pt did increased Bumex as directed on 12/4 to 2 tabs in AM and 1 tab in PM and started KCL 20mEq daily.  She reports pts legs seem more swollen today and his breathing is maybe a little more labored today.  Discussed with Kristen that will review with JULIUS Bautista and call back with recommendations. She verbalized understanding and agrees with this plan.     Of note, Kristen reports she had pt stop losratan since he was started on KCL.  Reviewed with Kristen that pt should remain on losartan 12.5mg daily, was not advised to stop this medication.  Kristen states she will have pt restart losartan today.      Pt is already scheduled for f/u with JULIUS Bautista and MARYANN on 12/11/17   Routed to JULIUS Bautista.  JACQUI Vargas 4:31 PM 12/6/2017

## 2017-12-07 RX ORDER — HYDRALAZINE HYDROCHLORIDE 50 MG/1
75 TABLET, FILM COATED ORAL 3 TIMES DAILY
Qty: 405 TABLET | Refills: 3 | Status: ON HOLD | OUTPATIENT
Start: 2017-12-07 | End: 2017-12-17

## 2017-12-07 RX ORDER — AMLODIPINE BESYLATE 2.5 MG/1
2.5 TABLET ORAL 2 TIMES DAILY
Qty: 180 TABLET | Refills: 3 | Status: ON HOLD | OUTPATIENT
Start: 2017-12-07 | End: 2017-12-17

## 2017-12-07 NOTE — PROGRESS NOTES
Called and spoke with Kristen.  Reviewed that JULIUS Bautista recommends that pt continue losartan, potassium, and increased Bumex dose.  She also recommends that amlodipine be decreased to 2.5mg (1/2 tab) BID to see if that helps with pt's swelling.  Also reviewed recommendation to increase hydralazine to 75mg (1.5 tabs) TID. Kristen wrote down instructions and read back correctly.      Also reviewed recommendation for pt to elevate his legs and wear compression stockings to help with edema.  Kristen confirms that pt has been elevating his legs above his heart for 20min 2-3 times daily.  She states pt does not have compression stockings and inquired were to get them.  Advised Kristen that JULIUS Bautista can write Rx for compression stockings at OV on 12/11, she verbalized understanding and agrees with this plan.     Kristen confirms that she has been tracking pt's wt and BP daily.  Asked Kristen to bring wt and BP record with to pt's next OV on 12/11 and encouraged her to come with pt for that visit.  She verbalized understanding and agrees with this plan. JACQUI Vargas 10:38 AM 12/7/2017

## 2017-12-07 NOTE — PROGRESS NOTES
Continue losartan, potassium, and increased Bumex dose. Let's decrease amlodipine to 2.5 mg twice daily to see if that helps with the swelling. Increase hydralazine to 75 mg three times daily. Recommend elevation and compression stockings to help with edema. Please make sure they are writing down his weights and blood pressures and have them bring that to his appointment on Monday.    Hope Che 9:04AM 12/7/2017

## 2017-12-11 ENCOUNTER — OFFICE VISIT (OUTPATIENT)
Dept: CARDIOLOGY | Facility: CLINIC | Age: 67
End: 2017-12-11
Attending: PHYSICIAN ASSISTANT
Payer: COMMERCIAL

## 2017-12-11 ENCOUNTER — HOSPITAL ENCOUNTER (INPATIENT)
Facility: CLINIC | Age: 67
LOS: 6 days | Discharge: HOME | DRG: 291 | End: 2017-12-17
Attending: HOSPITALIST | Admitting: HOSPITALIST
Payer: COMMERCIAL

## 2017-12-11 VITALS
BODY MASS INDEX: 32.97 KG/M2 | OXYGEN SATURATION: 93 % | HEART RATE: 58 BPM | SYSTOLIC BLOOD PRESSURE: 137 MMHG | WEIGHT: 210.5 LBS | DIASTOLIC BLOOD PRESSURE: 88 MMHG

## 2017-12-11 DIAGNOSIS — I50.30 (HFPEF) HEART FAILURE WITH PRESERVED EJECTION FRACTION (H): ICD-10-CM

## 2017-12-11 DIAGNOSIS — I50.33 ACUTE ON CHRONIC DIASTOLIC CONGESTIVE HEART FAILURE (H): ICD-10-CM

## 2017-12-11 DIAGNOSIS — I10 HYPERTENSION GOAL BP (BLOOD PRESSURE) < 130/80: Primary | ICD-10-CM

## 2017-12-11 DIAGNOSIS — I50.31 ACUTE DIASTOLIC HEART FAILURE (H): ICD-10-CM

## 2017-12-11 PROBLEM — I50.9 CHF (CONGESTIVE HEART FAILURE) (H): Status: ACTIVE | Noted: 2017-12-11

## 2017-12-11 LAB
ANION GAP SERPL CALCULATED.3IONS-SCNC: 10.2 MMOL/L (ref 6–17)
BUN SERPL-MCNC: 17 MG/DL (ref 7–30)
CALCIUM SERPL-MCNC: 9 MG/DL (ref 8.5–10.5)
CHLORIDE SERPL-SCNC: 90 MMOL/L (ref 98–107)
CO2 SERPL-SCNC: 27 MMOL/L (ref 23–29)
CREAT SERPL-MCNC: 1.15 MG/DL (ref 0.7–1.3)
GFR SERPL CREATININE-BSD FRML MDRD: 64 ML/MIN/1.7M2
GLUCOSE SERPL-MCNC: 135 MG/DL (ref 70–105)
NT-PROBNP SERPL-MCNC: 2783 PG/ML (ref 0–900)
POTASSIUM SERPL-SCNC: 4.2 MMOL/L (ref 3.5–5.1)
SODIUM SERPL-SCNC: 123 MMOL/L (ref 136–145)
TROPONIN I SERPL-MCNC: <0.015 UG/L (ref 0–0.04)
TROPONIN I SERPL-MCNC: <0.015 UG/L (ref 0–0.04)

## 2017-12-11 PROCEDURE — 36415 COLL VENOUS BLD VENIPUNCTURE: CPT | Performed by: INTERNAL MEDICINE

## 2017-12-11 PROCEDURE — 40000275 ZZH STATISTIC RCP TIME EA 10 MIN

## 2017-12-11 PROCEDURE — 25000128 H RX IP 250 OP 636: Performed by: INTERNAL MEDICINE

## 2017-12-11 PROCEDURE — 83880 ASSAY OF NATRIURETIC PEPTIDE: CPT | Performed by: PHYSICIAN ASSISTANT

## 2017-12-11 PROCEDURE — 21000001 ZZH R&B HEART CARE

## 2017-12-11 PROCEDURE — 36415 COLL VENOUS BLD VENIPUNCTURE: CPT | Performed by: PHYSICIAN ASSISTANT

## 2017-12-11 PROCEDURE — 99223 1ST HOSP IP/OBS HIGH 75: CPT | Mod: AI | Performed by: PHYSICIAN ASSISTANT

## 2017-12-11 PROCEDURE — 84484 ASSAY OF TROPONIN QUANT: CPT | Performed by: PHYSICIAN ASSISTANT

## 2017-12-11 PROCEDURE — 25000132 ZZH RX MED GY IP 250 OP 250 PS 637: Performed by: PHYSICIAN ASSISTANT

## 2017-12-11 PROCEDURE — 94660 CPAP INITIATION&MGMT: CPT

## 2017-12-11 PROCEDURE — 99215 OFFICE O/P EST HI 40 MIN: CPT | Performed by: PHYSICIAN ASSISTANT

## 2017-12-11 PROCEDURE — 99207 ZZC MOONLIGHTING INDICATOR: CPT | Performed by: PHYSICIAN ASSISTANT

## 2017-12-11 PROCEDURE — 84484 ASSAY OF TROPONIN QUANT: CPT | Performed by: INTERNAL MEDICINE

## 2017-12-11 PROCEDURE — 80048 BASIC METABOLIC PNL TOTAL CA: CPT | Performed by: INTERNAL MEDICINE

## 2017-12-11 RX ORDER — POTASSIUM CHLORIDE 1.5 G/1.58G
20-40 POWDER, FOR SOLUTION ORAL
Status: DISCONTINUED | OUTPATIENT
Start: 2017-12-11 | End: 2017-12-17 | Stop reason: HOSPADM

## 2017-12-11 RX ORDER — BISACODYL 5 MG
15 TABLET, DELAYED RELEASE (ENTERIC COATED) ORAL DAILY PRN
Status: DISCONTINUED | OUTPATIENT
Start: 2017-12-11 | End: 2017-12-17 | Stop reason: HOSPADM

## 2017-12-11 RX ORDER — ONDANSETRON 4 MG/1
4 TABLET, ORALLY DISINTEGRATING ORAL EVERY 6 HOURS PRN
Status: DISCONTINUED | OUTPATIENT
Start: 2017-12-11 | End: 2017-12-17 | Stop reason: HOSPADM

## 2017-12-11 RX ORDER — ONDANSETRON 2 MG/ML
4 INJECTION INTRAMUSCULAR; INTRAVENOUS EVERY 6 HOURS PRN
Status: DISCONTINUED | OUTPATIENT
Start: 2017-12-11 | End: 2017-12-17 | Stop reason: HOSPADM

## 2017-12-11 RX ORDER — FUROSEMIDE 10 MG/ML
40 INJECTION INTRAMUSCULAR; INTRAVENOUS 3 TIMES DAILY
Status: DISCONTINUED | OUTPATIENT
Start: 2017-12-11 | End: 2017-12-11

## 2017-12-11 RX ORDER — LEVETIRACETAM 500 MG/1
500 TABLET ORAL 2 TIMES DAILY
Status: DISCONTINUED | OUTPATIENT
Start: 2017-12-11 | End: 2017-12-17 | Stop reason: HOSPADM

## 2017-12-11 RX ORDER — CARVEDILOL 25 MG/1
50 TABLET ORAL 2 TIMES DAILY WITH MEALS
Status: DISCONTINUED | OUTPATIENT
Start: 2017-12-11 | End: 2017-12-17 | Stop reason: HOSPADM

## 2017-12-11 RX ORDER — POTASSIUM CHLORIDE 1500 MG/1
20-40 TABLET, EXTENDED RELEASE ORAL
Status: DISCONTINUED | OUTPATIENT
Start: 2017-12-11 | End: 2017-12-17 | Stop reason: HOSPADM

## 2017-12-11 RX ORDER — NALOXONE HYDROCHLORIDE 0.4 MG/ML
.1-.4 INJECTION, SOLUTION INTRAMUSCULAR; INTRAVENOUS; SUBCUTANEOUS
Status: DISCONTINUED | OUTPATIENT
Start: 2017-12-11 | End: 2017-12-17 | Stop reason: HOSPADM

## 2017-12-11 RX ORDER — AMOXICILLIN 250 MG
2 CAPSULE ORAL 2 TIMES DAILY PRN
Status: DISCONTINUED | OUTPATIENT
Start: 2017-12-11 | End: 2017-12-17 | Stop reason: HOSPADM

## 2017-12-11 RX ORDER — BISACODYL 5 MG
10 TABLET, DELAYED RELEASE (ENTERIC COATED) ORAL DAILY PRN
Status: DISCONTINUED | OUTPATIENT
Start: 2017-12-11 | End: 2017-12-17 | Stop reason: HOSPADM

## 2017-12-11 RX ORDER — AMOXICILLIN 250 MG
1 CAPSULE ORAL 2 TIMES DAILY PRN
Status: DISCONTINUED | OUTPATIENT
Start: 2017-12-11 | End: 2017-12-17 | Stop reason: HOSPADM

## 2017-12-11 RX ORDER — MAGNESIUM SULFATE HEPTAHYDRATE 40 MG/ML
4 INJECTION, SOLUTION INTRAVENOUS EVERY 4 HOURS PRN
Status: DISCONTINUED | OUTPATIENT
Start: 2017-12-11 | End: 2017-12-17 | Stop reason: HOSPADM

## 2017-12-11 RX ORDER — LATANOPROST 50 UG/ML
1 SOLUTION/ DROPS OPHTHALMIC AT BEDTIME
Status: DISCONTINUED | OUTPATIENT
Start: 2017-12-11 | End: 2017-12-17 | Stop reason: HOSPADM

## 2017-12-11 RX ORDER — POTASSIUM CHLORIDE 7.45 MG/ML
10 INJECTION INTRAVENOUS
Status: DISCONTINUED | OUTPATIENT
Start: 2017-12-11 | End: 2017-12-17 | Stop reason: HOSPADM

## 2017-12-11 RX ORDER — ASPIRIN 81 MG/1
81 TABLET ORAL DAILY
Status: DISCONTINUED | OUTPATIENT
Start: 2017-12-12 | End: 2017-12-17 | Stop reason: HOSPADM

## 2017-12-11 RX ORDER — AMLODIPINE BESYLATE 2.5 MG/1
2.5 TABLET ORAL 2 TIMES DAILY
Qty: 180 TABLET | Refills: 3 | Status: CANCELLED | OUTPATIENT
Start: 2017-12-11

## 2017-12-11 RX ORDER — POTASSIUM CL/LIDO/0.9 % NACL 10MEQ/0.1L
10 INTRAVENOUS SOLUTION, PIGGYBACK (ML) INTRAVENOUS
Status: DISCONTINUED | OUTPATIENT
Start: 2017-12-11 | End: 2017-12-17 | Stop reason: HOSPADM

## 2017-12-11 RX ORDER — MULTIPLE VITAMINS W/ MINERALS TAB 9MG-400MCG
1 TAB ORAL DAILY
COMMUNITY

## 2017-12-11 RX ORDER — ACETAMINOPHEN 325 MG/1
650 TABLET ORAL EVERY 4 HOURS PRN
Status: DISCONTINUED | OUTPATIENT
Start: 2017-12-11 | End: 2017-12-17 | Stop reason: HOSPADM

## 2017-12-11 RX ORDER — POTASSIUM CHLORIDE 29.8 MG/ML
20 INJECTION INTRAVENOUS
Status: DISCONTINUED | OUTPATIENT
Start: 2017-12-11 | End: 2017-12-17 | Stop reason: HOSPADM

## 2017-12-11 RX ORDER — AMLODIPINE BESYLATE 2.5 MG/1
2.5 TABLET ORAL 2 TIMES DAILY
Status: DISCONTINUED | OUTPATIENT
Start: 2017-12-11 | End: 2017-12-12

## 2017-12-11 RX ORDER — POTASSIUM CHLORIDE 1500 MG/1
20 TABLET, EXTENDED RELEASE ORAL DAILY
Status: DISCONTINUED | OUTPATIENT
Start: 2017-12-12 | End: 2017-12-14

## 2017-12-11 RX ORDER — BISACODYL 5 MG
5 TABLET, DELAYED RELEASE (ENTERIC COATED) ORAL DAILY PRN
Status: DISCONTINUED | OUTPATIENT
Start: 2017-12-11 | End: 2017-12-17 | Stop reason: HOSPADM

## 2017-12-11 RX ADMIN — HYDRALAZINE HYDROCHLORIDE 75 MG: 50 TABLET ORAL at 22:07

## 2017-12-11 RX ADMIN — FUROSEMIDE 5 MG/HR: 10 INJECTION, SOLUTION INTRAVENOUS at 18:10

## 2017-12-11 RX ADMIN — CARVEDILOL 50 MG: 25 TABLET, FILM COATED ORAL at 18:09

## 2017-12-11 RX ADMIN — LATANOPROST 1 DROP: 50 SOLUTION OPHTHALMIC at 22:17

## 2017-12-11 RX ADMIN — AMLODIPINE BESYLATE 2.5 MG: 2.5 TABLET ORAL at 20:42

## 2017-12-11 RX ADMIN — LEVETIRACETAM 500 MG: 500 TABLET, FILM COATED ORAL at 20:42

## 2017-12-11 RX ADMIN — Medication 25 MG: at 22:07

## 2017-12-11 NOTE — IP AVS SNAPSHOT
[facility]    [address] [place], [place] [address]., [address]    [place] [address]    Phone:  [phone]                                       After Visit Summary   12/11/2017    Aubrey Freeman    MRN: [id number]           After Visit Summary Signature Page     I have received my discharge instructions, and my questions have been answered. I have discussed any challenges I see with this plan with the nurse or doctor.    ..........................................................................................................................................  Patient/Patient Representative Signature      ..........................................................................................................................................  Patient Representative Print Name and Relationship to Patient    ..................................................               ................................................  Date                                            Time    ..........................................................................................................................................  Reviewed by Signature/Title    ...................................................              ..............................................  Date                                                            Time

## 2017-12-11 NOTE — PROGRESS NOTES
Pt seen and examined. See detailed note from ur [facility].  Chest - decerased AE at bases, JVP increased, 2-3+ leg edema.  Decompensated heart failure despite close fu and change to potent diuretics. No change in diet. No chest pain.   Cycle trops. Get ekg. Consider IV lasix drip.  May benefit from [facility] and [facility] this admit.  Repeat echo and if image quality poor, consider CMR.  May be candidate for cardimems.   CHF education done.  Hyponatremia, likely dilutional, repeat after diuresis.

## 2017-12-11 NOTE — PHARMACY-ADMISSION MEDICATION HISTORY
Admission Medication History    Admission medication history interview status for the 12/11/2017 admission is complete. See EPIC admission navigator for prior to admission medications     Medication history source reliability:Good    Actions taken by pharmacist (provider contacted, etc):None     Additional medication history information not noted on PTA med list :None    Medication reconciliation/reorder completed by provider prior to medication history? No    Time spent in this activity: 15 minutes    Prior to Admission medications    Medication Sig Last Dose Taking? Auth Provider   Ascorbic Acid (VITAMIN C) 500 MG CHEW Take by mouth daily 12/10/2017 at Noon Yes Reported, Patient   COENZYME Q-10 PO Take 100 mg by mouth daily 12/10/2017 at Noon Yes Unknown, Entered By History   BUMETANIDE PO Take 2 mg by mouth every morning 12/11/2017 at AM Yes Unknown, Entered By History   Bumetanide (BUMEX PO) Take 1 mg by mouth daily In the afternoon 12/11/2017 at 1500 Yes Unknown, Entered By History   LYSINE PO Take 1,500 mg by mouth daily 12/10/2017 at Noon Yes Unknown, Entered By History   multivitamin, therapeutic with minerals (MULTI-VITAMIN) TABS tablet Take 1 tablet by mouth daily [facility] Support formulation 12/10/2017 at Noon Yes Unknown, Entered By History   Probiotic Product (PROBIOTIC PO) Take 1 capsule by mouth daily Brand = Vegyzyme 12/10/2017 at Noon Yes Unknown, Entered By History   Zinc Sulfate (ZINC 15 PO) Take 15 mg by mouth daily 12/10/2017 at Noon Yes Unknown, Entered By History   MAGNESIUM PO Take 400 mg by mouth daily 12/10/2017 at Noon Yes Unknown, Entered By History   hydrALAZINE (APRESOLINE) 50 MG tablet Take 1.5 tablets (75 mg) by mouth 3 times daily 12/11/2017 at x2 Yes Hope Che PA-C   amLODIPine (NORVASC) 2.5 MG tablet Take 1 tablet (2.5 mg) by mouth 2 times daily 12/11/2017 at AM Yes Hope Che PA-C   potassium chloride SA (KLOR-CON) 20 MEQ CR tablet Take 1 tablet (20 mEq)  by mouth daily 12/11/2017 at AM Yes Hope Che PA-C   carvedilol (COREG) 25 MG tablet Take 2 tablets (50 mg) by mouth 2 times daily (with meals) 12/11/2017 at AM Yes Roxanna Kelly PA-C   losartan (COZAAR) 25 MG tablet Take 0.5 tablets (12.5 mg) by mouth daily 12/11/2017 at AM Yes Leticia, Roxanna Salter PA-C   aspirin EC 81 MG EC tablet Take 1 tablet (81 mg) by mouth daily 12/11/2017 at AM Yes Jered Yin MD   traZODone (DESYREL) 50 MG tablet Take 25 mg by mouth At Bedtime 12/10/2017 at HS Yes Reported, Patient   latanoprost (XALATAN) 0.005 % ophthalmic solution Place 1 drop Into the left eye At Bedtime 12/10/2017 at HS Yes Reported, Patient   LEVETIRACETAM OR Keppra 500 mg twice daily 12/11/2017 at AM Yes Reported, Patient       Elvis Metcalf, PeterD

## 2017-12-11 NOTE — LETTER
Transition Communication Hand-off for Care Transitions to Next Level of Care Provider    Name: Aubrey Freeman  MRN #: [id number]  Primary Care Provider: Seamus Carroll     Primary Clinic: [facility] [address]  [facility] [address]     Reason for Hospitalization:  heart failure  CHF (congestive heart failure) (H)  Admit Date/Time: 12/11/2017  4:10 PM  Discharge Date: 12/17/17  Payor Source: Payor: [employer]ARE / Plan: [employer] SENIORS NON FPA / Product Type: HMO /     Readmission Assessment Measure (BEN) Risk Score/category: Elevated         Reason for Communication Hand-off Referral: Admission diagnoses: CHF    Discharge Plan: home with outpt cardiac follow-up and follow-up with PCP with BMP and Hemoglobin       Concern for non-adherence with plan of care:   Y/N :No  Discharge Needs Assessment:  Needs       Most Recent Value    Equipment Currently Used at Home grab bar    Transportation Available car, family or friend will provide [Wife does most driving,  however, pt still does. ]          Already enrolled in Tele-monitoring program and name of program:  yes, [facility]  Follow-up specialty is recommended: Yes    Follow-up plan:  Future Appointments  Date Time Provider Department Center   12/18/2017 5:00 AM Martina Mcgovern, OT [facility]   12/21/2017 11:30 AM MIRANDA LAB SUL[email or web address]   12/21/2017 12:30 PM Hope Che PA-C [facility]   1/19/2018 9:45 AM Dave Miranda MD [facility]       Any outstanding tests or procedures:              Key Recommendations:  Pt needs to be seen within 7 days with PCP and needs BMP and Hemoglobin drawn. May need follow-up with Oncology should his hemoglobin continues to be low.    Maria Horton RN    AVS/Discharge Summary is the source of truth; this is a helpful guide for improved communication of patient story

## 2017-12-11 NOTE — NURSING NOTE
Per Laura Che PAC, pt to be admitted to [facility] [place]. Called pt placement to arrange for bed. Pt placement to connect Laura to hospitalist.     Will await bed assignment and transport pt to hospital when bed available.    Nina Larsen [facility] RN 2:46 PM 12/11/17  [phone]

## 2017-12-11 NOTE — MR AVS SNAPSHOT
After Visit Summary   12/11/2017    Aubrey Freeman    MRN: [id number]           Patient Information     Date Of Birth          1950        Visit Information        Provider Department      12/11/2017 1:50 PM Hope Che PA-C [facility]   [place]        Today's Diagnoses     Hypertension goal BP (blood pressure) < 130/80    -  1    (HFpEF) heart failure with preserved ejection fraction (H)        Acute diastolic heart failure (H)           Follow-ups after your visit        Your next 10 appointments already scheduled     Jan 19, 2018  9:45 AM CST   Return Visit with Dave Miranda MD   [facility]   [place] ([facility])    [facility] [address] [address] [phone]              Future tests that were ordered for you today     Open Standing Orders        Priority Remaining Interval Expires Ordered    CPAP for stable sleep apnea with home equipment settings Routine 15/16 AT BEDTIME  12/11/2017    Troponin I Timed 1/3 NOW THEN EVERY 6 HOURS  12/11/2017    Oxygen: Nasal cannula, Oxygen mask Routine 56105/12048 CONTINUOUS  12/11/2017    Basic metabolic panel Routine 1/1 AM DRAW  12/11/2017    CBC with platelets differential Routine 1/1 AM DRAW  12/11/2017    Potassium Routine 100/100 CONDITIONAL (SPECIFY)  12/11/2017    Magnesium Routine 100/100 CONDITIONAL (SPECIFY)  12/11/2017    Potassium STAT 1/1 CONDITIONAL X 1 STAT  12/11/2017    Daily standing weights Routine 1/1 DAILY  12/11/2017    Activity: Up with assist Routine 72488/73264 PRN  12/11/2017    Daily weights Routine 1/1 DAILY  12/11/2017            Who to contact     If you have questions or need follow up information about today's clinic visit or your schedule please contact [facility] directly at [phone].  Normal or non-critical lab and imaging results will be communicated to you by  "MyChart, letter or phone within 4 business days after the clinic has received the results. If you do not hear from us within 7 days, please contact the clinic through [employer] or phone. If you have a critical or abnormal lab result, we will notify you by phone as soon as possible.  Submit refill requests through [employer] or call your pharmacy and they will forward the refill request to us. Please allow 3 business days for your refill to be completed.          Additional Information About Your Visit        [email or web address] Information     [employer] lets you send messages to your doctor, view your test results, renew your prescriptions, schedule appointments and more. To sign up, go to [email or web address] . Click on \"Log in\" on the left side of the screen, which will take you to the Welcome page. Then click on \"Sign up Now\" on the right side of the page.     You will be asked to enter the access code listed below, as well as some personal information. Please follow the directions to create your username and password.     Your access code is: [email or web address]  Expires: 2018  6:16 PM     Your access code will  in 90 days. If you need help or a new code, please call your [place] clinic or [phone].        Care EveryWhere ID     This is your Care EveryWhere ID. This could be used by other organizations to access your [place] medical records  [id number]        Your Vitals Were     Pulse Pulse Oximetry BMI (Body Mass Index)             58 93% 32.97 kg/m2          Blood Pressure from Last 3 Encounters:   17 127/72   17 137/88   17 132/80    Weight from Last 3 Encounters:   17 95.4 kg (210 lb 6.4 oz)   17 95.5 kg (210 lb 8 oz)   17 93.4 kg (206 lb)              We Performed the Following     Follow-Up with [facility]        Primary Care Provider Office Phone # Fax #    Seamus Carroll [phone]       [facility] [address] [place] [address] " [phone]        Equal Access to Services     [facility] : Hadii osman waddell smooth Tirado, waaxda luqadaha, qaybta kaalmamarissa stearns, enrico minler. So [facility] [phone].    ATENCIÓN: Si habla español, tiene a rodriguez disposición servicios gratuitos de asistencia lingüística. Amado al [phone].    We comply with applicable federal civil rights laws and [place] laws. We do not discriminate on the basis of race, color, national origin, age, disability, sex, sexual orientation, or gender identity.            Thank you!     Thank you for choosing [facility]  for your care. Our goal is always to provide you with excellent care. Hearing back from our patients is one way we can continue to improve our services. Please take a few minutes to complete the written survey that you may receive in the mail after your visit with us. Thank you!             Your Updated Medication List - Protect others around you: Learn how to safely use, store and throw away your medicines at www.disposemymeds.org.          This list is accurate as of: 12/11/17  4:10 PM.  Always use your most recent med list.                   Brand Name Dispense Instructions for use Diagnosis    amLODIPine 2.5 MG tablet    NORVASC    180 tablet    Take 1 tablet (2.5 mg) by mouth 2 times daily    Hypertension goal BP (blood pressure) < 130/80       aspirin 81 MG EC tablet     100 tablet    Take 1 tablet (81 mg) by mouth daily    Acute systolic congestive heart failure (H)       carvedilol 25 MG tablet    COREG    120 tablet    Take 2 tablets (50 mg) by mouth 2 times daily (with meals)    Hypertensive crisis       hydrALAZINE 50 MG tablet    APRESOLINE    405 tablet    Take 1.5 tablets (75 mg) by mouth 3 times daily    Hypertensive crisis       latanoprost 0.005 % ophthalmic solution    XALATAN     Place 1 drop Into the left eye At Bedtime        LEVETIRACETAM PO      Keppra 500 mg twice daily         losartan 25 MG tablet    COZAAR    30 tablet    Take 0.5 tablets (12.5 mg) by mouth daily    Hypertensive crisis       potassium chloride SA 20 MEQ CR tablet    KLOR-CON    30 tablet    Take 1 tablet (20 mEq) by mouth daily    (HFpEF) heart failure with preserved ejection fraction (H)       traZODone 50 MG tablet    DESYREL     Take 25 mg by mouth At Bedtime        vitamin C 500 MG Chew      Take by mouth daily

## 2017-12-11 NOTE — IP AVS SNAPSHOT
MRN:[id number]                      After Visit Summary   12/11/2017    Aubrey Freemna    MRN: [id number]           Thank you!     Thank you for choosing [facility] for your care. Our goal is always to provide you with excellent care. Hearing back from our patients is one way we can continue to improve our services. Please take a few minutes to complete the written survey that you may receive in the mail after you visit with us. Thank you!        Patient Information     Date Of Birth          1950        Designated Caregiver       Most Recent Value    Caregiver    Will someone help with your care after discharge? yes    Name of designated caregiver Kristen Freeman    Phone number of caregiver [phone]    Caregiver address [address] [place] [address]      About your hospital stay     You were admitted on:  December 11, 2017 You last received care in the:  [facility]    You were discharged on:  December 17, 2017        Reason for your hospital stay       Further management of decompensated heart failure.                  Who to Call     For medical emergencies, please call 911.  For non-urgent questions about your medical care, please call your primary care provider or clinic, [phone]          Attending Provider     Provider Specialty    Willem Yadav MD Internal Medicine       Primary Care Provider Office Phone # Fax #    Seamus Carroll [phone]      After Care Instructions     Activity       Your activity upon discharge: activity as tolerated            Diet       Follow this diet upon discharge: Orders Placed This Encounter      Combination Diet Low Saturated Fat Na <2400mg Diet            Discharge Instructions       Compression stockings or ACE wrap b/l LE up to knees during daytime.                  Follow-up Appointments     Follow-up and recommended labs and tests        Follow up with primary care providerSeamus  Glen, within 7 days for hospital follow- up.  The following labs/tests are recommended: BMP and hemoglobin. If hemoglobin reduces over time then will need hematology f/u for bone marrow biopsy.  F/u Cardiology for consideration of Cardiomems device, OP angio and/or cardiac MR  F/u CORE clinic                  Your next 10 appointments already scheduled     Dec 21, 2017 11:30 AM CST   LAB with MIRANDA [facility]   [facility] ([facility])    [address] [place] [id number]   [phone]           Please do not eat 10-12 hours before your appointment if you are coming in fasting for labs on lipids, cholesterol, or glucose (sugar). This does not apply to pregnant women. Water, hot tea and black coffee (with nothing added) are okay. Do not drink other fluids, diet soda or chew gum.            Dec 21, 2017 12:30 PM CST   Core Return with Hope Che PA-C   [facility] ([facility])    [address] [place] [address]   [phone]            Jan 19, 2018  9:45 AM CST   Return Visit with Dave Miranda MD   [facility] ([facility])    [address] [place] [id number] [phone]              Further instructions from your care team       Please call your Primary Care Physician Dr. Seamus Carroll to make an appointment within 7 days of discharge.  It is very important to go to it--bring these papers and your insurance card with you.  At the visit, talk about your hospital stay.  Tell your doctor how you feel.  Show your new list of medications.  Your doctor will update records, make sure you are still doing OK, and decide if any tests or medication changes are needed. Please make sure you have your labs drawn at this appointment.     General Recommendations To Control Heart Failure When You Get Home       Heart  Failure Instructions for Patients and Families: Please read and check off each of these important instructions as you do them when you get home.          Weight and Symptoms       ___ Put a scale in your bathroom.    ___ Post a weight chart or calendar next to your scale.    ___ Weigh yourself everyday as soon as you get up in the morning (before breakfast).  You should only be wearing your pajamas.  Write your weight on the chart/calendar.  ___ Bring your weight chart/calendar with you to all appointments.  ___ Call your doctor or nurse practitioner if you gain 2 pounds (in 1 day) or 5 pounds in (1 week) from your goal  good  weight.  Your good weight is also called your  dry  weight.  Your doctor or nurse will tell you what your good weight should be.    ___ Call your doctor or nurse practitioner if you have shortness of breath that gets worse over time, leg swelling or fatigue.       Medications and Diet       ___ Make sure to take your medication as prescribed.    ___ Bring a current list of your medication and all of your medicine bottles with you to all appointments.    ___ Limit fluids if you still have swelling or shortness of breath, or if your doctor tells you to do so.    ___ Eat less than 2000 mg of sodium (salt) every day. Read food labels, and do not add salt to meals. Remember, if you eat less salt you retain less fluid.  ___ Follow a heart healthy diet that is low in saturated fat.        Activity and Suggested Lifestyle Changes      ___ Stay active. Talk to your doctor about an exercise program that is safe for your heart.  ___ Stop smoking. Reduce alcohol use.      ___ Lose weight if you are overweight. Extra weight puts a lot of stress on the heart.                 Control for Leg Swelling     ___ Keep your legs elevated (raised) as needed for swelling. If swelling is uncomfortable or elevation doesn t help, ask your doctor about using ACE wraps or support stockings.        What is the C.O.R.E.  Clinic?  Cardiomyopathy  Optimization  Rehabilitation  Education    The C.O.R.E. Clinic is a heart failure specialty clinic within [facility].  It is an outpatient disease management program that is based on a phase-by-phase approach, which is tailored to each patient s individual needs.  The cardiologist, nurse practitioner, physician assistant and nurses provide an ongoing outpatient care and treatment plan that guides heart failure and cardiomyopathy patients from evaluation and education to stabilization. This team works with your current primary care doctor and cardiologist to help you:    - Avoid hospitalizations  - Slow the progression of the disease  - Improve length and quality of life  - Know who and when to call if heart failure symptoms appear  - Receive easy access to quality health care and advice  - Better understand your condition and treatment  - Decrease the tremendous cost burden of heart failure care  - Detect future heart problems before they become life threatening                                 Follow-up Appointments     ___ An appointment with the C.O.R.E. Clinic may already have been made for you (see section   Your next appointments already scheduled ).  If you have a question about your appointment, would like to speak with a C.O.R.E. nurse, or would like to become a C.O.R.E. Clinic patient, please call one of the following locations:     - [facility]):                                             [phone]  - [facility]):                                            [phone]  - [facility] ([place]):                 [phone]      ___ Your C.O.R.E. Clinic Team will continue to educate you on your heart failure and may adjust medications based on your vital signs, lab work, and how you are feeling.  Therefore, it is very important to bring the following to all C.O.R.E. appointments:    - An  "accurate list of your medications  - Your medicine bottles  - Your weight chart/calendar                   Pending Results     No orders found from 2017 to 2017.            Statement of Approval     Ordered          17 1157  I have reviewed and agree with all the recommendations and orders detailed in this document.  EFFECTIVE NOW     Approved and electronically signed by:  Xiang De La Rosa MD             Admission Information     Date & Time Provider Department Dept. Phone    2017 Willem Yadav MD [facility] [phone]      Your Vitals Were     Blood Pressure Pulse Temperature Respirations Weight Pulse Oximetry    139/76 (BP Location: Left arm) 64 98  F (36.7  C) (Oral) 18 91.7 kg (202 lb 3.2 oz) 95%    BMI (Body Mass Index)                   31.67 kg/m2           MyChart Information     [employer] lets you send messages to your doctor, view your test results, renew your prescriptions, schedule appointments and more. To sign up, go to [email or web address] . Click on \"Log in\" on the left side of the screen, which will take you to the Welcome page. Then click on \"Sign up Now\" on the right side of the page.     You will be asked to enter the access code listed below, as well as some personal information. Please follow the directions to create your username and password.     Your access code is: [email or web address]  Expires: 2018  6:16 PM     Your access code will  in 90 days. If you need help or a new code, please call your [place] clinic or [phone].        Care EveryWhere ID     This is your Care EveryWhere ID. This could be used by other organizations to access your [place] medical records  [id number]        Equal Access to Services     [facility] EVERARDO : Edelmira Tirado, jeanne cardenas, meenakshi stearns, enrico milner. So [facility] [phone].    ATENCIÓN: Si habla español, tiene a rodriguez " disposición servicios gratuitos de asistencia lingüística. Amado duenas [phone].    We comply with applicable federal civil rights laws and [place] laws. We do not discriminate on the basis of race, color, national origin, age, disability, sex, sexual orientation, or gender identity.               Review of your medicines      START taking        Dose / Directions    torsemide 20 MG tablet   Commonly known as:  DEMADEX   Used for:  Acute on chronic diastolic congestive heart failure (H)        Dose:  20 mg   Start taking on:  12/18/2017   Take 1 tablet (20 mg) by mouth daily   Quantity:  30 tablet   Refills:  0         CONTINUE these medicines which may have CHANGED, or have new prescriptions. If we are uncertain of the size of tablets/capsules you have at home, strength may be listed as something that might have changed.        Dose / Directions    hydrALAZINE 50 MG tablet   Commonly known as:  APRESOLINE   This may have changed:  how much to take   Used for:  Hypertension goal BP (blood pressure) < 130/80        Dose:  50 mg   Take 1 tablet (50 mg) by mouth 3 times daily   Quantity:  90 tablet   Refills:  0         CONTINUE these medicines which have NOT CHANGED        Dose / Directions    aspirin 81 MG EC tablet   Used for:  Acute systolic congestive heart failure (H)        Dose:  81 mg   Take 1 tablet (81 mg) by mouth daily   Quantity:  100 tablet   Refills:  3       carvedilol 25 MG tablet   Commonly known as:  COREG   Used for:  Hypertensive crisis        Dose:  50 mg   Take 2 tablets (50 mg) by mouth 2 times daily (with meals)   Quantity:  120 tablet   Refills:  3       COENZYME Q-10 PO        Dose:  100 mg   Take 100 mg by mouth daily   Refills:  0       latanoprost 0.005 % ophthalmic solution   Commonly known as:  XALATAN        Dose:  1 drop   Place 1 drop Into the left eye At Bedtime   Refills:  0       LEVETIRACETAM PO        Keppra 500 mg twice daily   Refills:  0       LYSINE PO        Dose:  1500  mg   Take 1,500 mg by mouth daily   Refills:  0       MAGNESIUM PO        Dose:  400 mg   Take 400 mg by mouth daily   Refills:  0       Multi-vitamin Tabs tablet        Dose:  1 tablet   Take 1 tablet by mouth daily [facility] Support formulation   Refills:  0       potassium chloride SA 20 MEQ CR tablet   Commonly known as:  KLOR-CON   Used for:  (HFpEF) heart failure with preserved ejection fraction (H)        Dose:  20 mEq   Take 1 tablet (20 mEq) by mouth daily   Quantity:  30 tablet   Refills:  3       PROBIOTIC PO        Dose:  1 capsule   Take 1 capsule by mouth daily Brand = Vegyzyme   Refills:  0       traZODone 50 MG tablet   Commonly known as:  DESYREL        Dose:  25 mg   Take 25 mg by mouth At Bedtime   Refills:  0       vitamin C 500 MG Chew        Take by mouth daily   Refills:  0       ZINC 15 PO        Dose:  15 mg   Take 15 mg by mouth daily   Refills:  0         STOP taking     amLODIPine 2.5 MG tablet   Commonly known as:  NORVASC           BUMETANIDE PO           BUMEX PO           losartan 25 MG tablet   Commonly known as:  COZAAR                Where to get your medicines      These medications were sent to [facility] [place] - [address]  [address] [facility], [facility] [address]     Phone:  [phone]     hydrALAZINE 50 MG tablet    torsemide 20 MG tablet                Protect others around you: Learn how to safely use, store and throw away your medicines at www.disposemymeds.org.             Medication List: This is a list of all your medications and when to take them. Check marks below indicate your daily home schedule. Keep this list as a reference.      Medications           Morning Afternoon Evening Bedtime As Needed    aspirin 81 MG EC tablet   Take 1 tablet (81 mg) by mouth daily   Last time this was given:  81 mg on 12/17/2017  8:32 AM   Next Dose Due:  12/18 morning                                   carvedilol 25 MG tablet   Commonly known as:  COREG    Take 2 tablets (50 mg) by mouth 2 times daily (with meals)   Last time this was given:  50 mg on 12/17/2017  8:32 AM   Next Dose Due:  12/17 evening                                      COENZYME Q-10 PO   Take 100 mg by mouth daily   Next Dose Due:  12/18 morning                                   hydrALAZINE 50 MG tablet   Commonly known as:  APRESOLINE   Take 1 tablet (50 mg) by mouth 3 times daily   Last time this was given:  50 mg on 12/17/2017  8:32 AM   Next Dose Due:  12/17 at 4pm and bedtime                                         latanoprost 0.005 % ophthalmic solution   Commonly known as:  XALATAN   Place 1 drop Into the left eye At Bedtime   Last time this was given:  1 drop on 12/16/2017  9:48 PM   Next Dose Due:  12/17 evening                                   LEVETIRACETAM PO   Keppra 500 mg twice daily   Last time this was given:  500 mg on 12/17/2017  8:32 AM   Next Dose Due:  12/17 evening                                      LYSINE PO   Take 1,500 mg by mouth daily   Next Dose Due:  12/18 morning                                   MAGNESIUM PO   Take 400 mg by mouth daily   Next Dose Due:  12/18 morning                                   Multi-vitamin Tabs tablet   Take 1 tablet by mouth daily [facility] Support formulation   Next Dose Due:  12/18 morning                                   potassium chloride SA 20 MEQ CR tablet   Commonly known as:  KLOR-CON   Take 1 tablet (20 mEq) by mouth daily   Last time this was given:  20 mEq on 12/14/2017  8:30 AM   Next Dose Due:  12/18 morning                                   PROBIOTIC PO   Take 1 capsule by mouth daily Brand = Vegyzyme   Next Dose Due:  12/18 morning                                   torsemide 20 MG tablet   Commonly known as:  DEMADEX   Take 1 tablet (20 mg) by mouth daily   Start taking on:  12/18/2017   Last time this was given:  20 mg on 12/17/2017  8:32 AM   Next Dose Due:  12/18 morning                                   traZODone 50 MG  tablet   Commonly known as:  DESYREL   Take 25 mg by mouth At Bedtime   Last time this was given:  25 mg on 12/16/2017  9:47 PM   Next Dose Due:  12/17 evening                                   vitamin C 500 MG Chew   Take by mouth daily   Next Dose Due:  12/18 morning                                   ZINC 15 PO   Take 15 mg by mouth daily   Next Dose Due:  12/18 morning                                             More Information        Patient Education    Torsemide Oral tablet    Torsemide Solution for injection  Torsemide Oral tablet  What is this medicine?  TORSEMIDE (TORE se mide) is a diuretic. It helps you make more urine and to lose salt and excess water from your body. This medicine is used to treat high blood pressure, and edema or swelling from heart, kidney, or liver disease.  This medicine may be used for other purposes; ask your health care provider or pharmacist if you have questions.  What should I tell my health care provider before I take this medicine?  They need to know if you have any of these conditions:    abnormal blood electrolytes    diabetes    gout    heart disease    kidney disease    liver disease    small amounts of urine, or difficulty passing urine    an unusual or allergic reaction to torsemide, sulfa drugs, other medicines, foods, dyes, or preservatives    pregnant or trying to get pregnant    breast-feeding  How should I use this medicine?  Take this medicine by mouth with a glass of water. Follow the directions on the prescription label. You may take this medicine with or without food. If it upsets your stomach, take it with food or milk. Do not take your medicine more often than directed. Remember that you will need to pass more urine after taking this medicine. Do not take your medicine at a time of day that will cause you problems. Do not take at bedtime.  Talk to your pediatrician regarding the use of this medicine in children. Special care may be needed.  Overdosage: If  you think you have taken too much of this medicine contact a poison control center or emergency room at once.  NOTE: This medicine is only for you. Do not share this medicine with others.  What if I miss a dose?  If you miss a dose, take it as soon as you can. If it is almost time for your next dose, take only that dose. Do not take double or extra doses.  What may interact with this medicine?    alcohol    certain antibiotics given by injection  certain heart medicines like digoxin    diuretics    lithium    medicines for diabetes    medicines for blood pressure    medicines for cholesterol like cholestyramine    medicines that relax muscles for surgery    NSAIDs, medicines for pain and inflammation, like ibuprofen or naproxen    OTC supplements like ginseng and ephedra    probenecid    steroid medicines like prednisone or cortisone  This list may not describe all possible interactions. Give your health care provider a list of all the medicines, herbs, non-prescription drugs, or dietary supplements you use. Also tell them if you smoke, drink alcohol, or use illegal drugs. Some items may interact with your medicine.  What should I watch for while using this medicine?  Visit your doctor or health care professional for regular checks on your progress. Check your blood pressure regularly. Ask your doctor or health care professional what your blood pressure should be, and when you should contact him or her. If you are a diabetic, check your blood sugar as directed.  You may need to be on a special diet while taking this medicine. Check with your doctor. Also, ask how many glasses of fluid you need to drink a day. You must not get dehydrated.  You may get drowsy or dizzy. Do not drive, use machinery, or do anything that needs mental alertness until you know how this drug affects you. Do not stand or sit up quickly, especially if you are an older patient. This reduces the risk of dizzy or fainting spells. Alcohol can make  you more drowsy and dizzy. Avoid alcoholic drinks.  What side effects may I notice from receiving this medicine?  Side effects that you should report to your doctor or health care professional as soon as possible:    allergic reactions such as skin rash or itching, hives, swelling of the lips, mouth, tongue or throat    blood in urine or stool    dry mouth    hearing loss or ringing in the ears    irregular heartbeat    muscle pain, weakness or cramps    pain or difficulty passing urine    unusually weak or tired    vomiting or diarrhea  Side effects that usually do not require medical attention (report to your doctor or health care professional if they continue or are bothersome):    dizzy or lightheaded    headache    increased thirst    passing large amounts of urine    sexual difficulties    stomach pain, upset or nausea  This list may not describe all possible side effects. Call your doctor for medical advice about side effects. You may report side effects to FDA at 1-019-FDA-3021.  Where should I keep my medicine?  Keep out of the reach of children.  Store at room temperature between 15 and 30 degrees C (59 and 86 degrees F). Throw away any unused medicine after the expiration date.  NOTE:This sheet is a summary. It may not cover all possible information. If you have questions about this medicine, talk to your doctor, pharmacist, or health care provider. Copyright  2016 [employer]
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
## 2017-12-12 ENCOUNTER — APPOINTMENT (OUTPATIENT)
Dept: OCCUPATIONAL THERAPY | Facility: CLINIC | Age: 67
DRG: 291 | End: 2017-12-12
Attending: PHYSICIAN ASSISTANT
Payer: COMMERCIAL

## 2017-12-12 ENCOUNTER — APPOINTMENT (OUTPATIENT)
Dept: CARDIOLOGY | Facility: CLINIC | Age: 67
DRG: 291 | End: 2017-12-12
Attending: INTERNAL MEDICINE
Payer: COMMERCIAL

## 2017-12-12 DIAGNOSIS — I50.33 ACUTE ON CHRONIC DIASTOLIC HEART FAILURE (H): Primary | ICD-10-CM

## 2017-12-12 LAB
ANION GAP SERPL CALCULATED.3IONS-SCNC: 11 MMOL/L (ref 3–14)
BASOPHILS # BLD AUTO: 0 10E9/L (ref 0–0.2)
BASOPHILS NFR BLD AUTO: 0.6 %
BUN SERPL-MCNC: 18 MG/DL (ref 7–30)
CALCIUM SERPL-MCNC: 8.2 MG/DL (ref 8.5–10.1)
CHLORIDE SERPL-SCNC: 95 MMOL/L (ref 94–109)
CO2 SERPL-SCNC: 24 MMOL/L (ref 20–32)
CREAT SERPL-MCNC: 1.02 MG/DL (ref 0.66–1.25)
DIFFERENTIAL METHOD BLD: ABNORMAL
EOSINOPHIL # BLD AUTO: 0.2 10E9/L (ref 0–0.7)
EOSINOPHIL NFR BLD AUTO: 3.2 %
ERYTHROCYTE [DISTWIDTH] IN BLOOD BY AUTOMATED COUNT: 13.9 % (ref 10–15)
GFR SERPL CREATININE-BSD FRML MDRD: 73 ML/MIN/1.7M2
GLUCOSE SERPL-MCNC: 88 MG/DL (ref 70–99)
HCT VFR BLD AUTO: 28.4 % (ref 40–53)
HGB BLD-MCNC: 9.7 G/DL (ref 13.3–17.7)
IMM GRANULOCYTES # BLD: 0 10E9/L (ref 0–0.4)
IMM GRANULOCYTES NFR BLD: 0.2 %
LYMPHOCYTES # BLD AUTO: 0.7 10E9/L (ref 0.8–5.3)
LYMPHOCYTES NFR BLD AUTO: 12.8 %
MAGNESIUM SERPL-MCNC: 2.2 MG/DL (ref 1.6–2.3)
MCH RBC QN AUTO: 29.8 PG (ref 26.5–33)
MCHC RBC AUTO-ENTMCNC: 34.2 G/DL (ref 31.5–36.5)
MCV RBC AUTO: 87 FL (ref 78–100)
MONOCYTES # BLD AUTO: 0.9 10E9/L (ref 0–1.3)
MONOCYTES NFR BLD AUTO: 16.8 %
NEUTROPHILS # BLD AUTO: 3.4 10E9/L (ref 1.6–8.3)
NEUTROPHILS NFR BLD AUTO: 66.4 %
NRBC # BLD AUTO: 0 10*3/UL
NRBC BLD AUTO-RTO: 0 /100
PLATELET # BLD AUTO: 167 10E9/L (ref 150–450)
POTASSIUM SERPL-SCNC: 3 MMOL/L (ref 3.4–5.3)
POTASSIUM SERPL-SCNC: 3.8 MMOL/L (ref 3.4–5.3)
RBC # BLD AUTO: 3.25 10E12/L (ref 4.4–5.9)
SODIUM SERPL-SCNC: 130 MMOL/L (ref 133–144)
TROPONIN I SERPL-MCNC: <0.015 UG/L (ref 0–0.04)
WBC # BLD AUTO: 5.1 10E9/L (ref 4–11)

## 2017-12-12 PROCEDURE — 36415 COLL VENOUS BLD VENIPUNCTURE: CPT | Performed by: NURSE PRACTITIONER

## 2017-12-12 PROCEDURE — 99233 SBSQ HOSP IP/OBS HIGH 50: CPT | Performed by: INTERNAL MEDICINE

## 2017-12-12 PROCEDURE — 25000128 H RX IP 250 OP 636: Performed by: INTERNAL MEDICINE

## 2017-12-12 PROCEDURE — 97110 THERAPEUTIC EXERCISES: CPT | Mod: GO

## 2017-12-12 PROCEDURE — 93308 TTE F-UP OR LMTD: CPT | Mod: 26 | Performed by: INTERNAL MEDICINE

## 2017-12-12 PROCEDURE — 97535 SELF CARE MNGMENT TRAINING: CPT | Mod: GO

## 2017-12-12 PROCEDURE — 93325 DOPPLER ECHO COLOR FLOW MAPG: CPT | Mod: 26 | Performed by: INTERNAL MEDICINE

## 2017-12-12 PROCEDURE — 80048 BASIC METABOLIC PNL TOTAL CA: CPT | Performed by: INTERNAL MEDICINE

## 2017-12-12 PROCEDURE — 83735 ASSAY OF MAGNESIUM: CPT | Performed by: INTERNAL MEDICINE

## 2017-12-12 PROCEDURE — 25500064 ZZH RX 255 OP 636: Performed by: HOSPITALIST

## 2017-12-12 PROCEDURE — 25000132 ZZH RX MED GY IP 250 OP 250 PS 637: Performed by: PHYSICIAN ASSISTANT

## 2017-12-12 PROCEDURE — 93321 DOPPLER ECHO F-UP/LMTD STD: CPT | Mod: 26 | Performed by: INTERNAL MEDICINE

## 2017-12-12 PROCEDURE — 84484 ASSAY OF TROPONIN QUANT: CPT | Performed by: INTERNAL MEDICINE

## 2017-12-12 PROCEDURE — 85025 COMPLETE CBC W/AUTO DIFF WBC: CPT | Performed by: INTERNAL MEDICINE

## 2017-12-12 PROCEDURE — 40000133 ZZH STATISTIC OT WARD VISIT

## 2017-12-12 PROCEDURE — 36415 COLL VENOUS BLD VENIPUNCTURE: CPT | Performed by: INTERNAL MEDICINE

## 2017-12-12 PROCEDURE — 21000001 ZZH R&B HEART CARE

## 2017-12-12 PROCEDURE — 99233 SBSQ HOSP IP/OBS HIGH 50: CPT | Mod: 25 | Performed by: INTERNAL MEDICINE

## 2017-12-12 PROCEDURE — 97165 OT EVAL LOW COMPLEX 30 MIN: CPT | Mod: GO

## 2017-12-12 PROCEDURE — 40000264 ECHO LIMITED WITH LUMASON

## 2017-12-12 PROCEDURE — 84132 ASSAY OF SERUM POTASSIUM: CPT | Performed by: NURSE PRACTITIONER

## 2017-12-12 RX ORDER — LOSARTAN POTASSIUM 25 MG/1
25 TABLET ORAL DAILY
Status: DISCONTINUED | OUTPATIENT
Start: 2017-12-13 | End: 2017-12-13

## 2017-12-12 RX ADMIN — HYDRALAZINE HYDROCHLORIDE 75 MG: 50 TABLET ORAL at 21:37

## 2017-12-12 RX ADMIN — CARVEDILOL 50 MG: 25 TABLET, FILM COATED ORAL at 08:55

## 2017-12-12 RX ADMIN — LATANOPROST 1 DROP: 50 SOLUTION OPHTHALMIC at 21:37

## 2017-12-12 RX ADMIN — ASPIRIN 81 MG: 81 TABLET, COATED ORAL at 08:56

## 2017-12-12 RX ADMIN — CARVEDILOL 50 MG: 25 TABLET, FILM COATED ORAL at 17:22

## 2017-12-12 RX ADMIN — POTASSIUM CHLORIDE 20 MEQ: 1500 TABLET, EXTENDED RELEASE ORAL at 08:55

## 2017-12-12 RX ADMIN — SULFUR HEXAFLUORIDE 2 ML: KIT at 08:45

## 2017-12-12 RX ADMIN — LEVETIRACETAM 500 MG: 500 TABLET, FILM COATED ORAL at 21:37

## 2017-12-12 RX ADMIN — FUROSEMIDE 5 MG/HR: 10 INJECTION, SOLUTION INTRAVENOUS at 13:18

## 2017-12-12 RX ADMIN — HYDRALAZINE HYDROCHLORIDE 75 MG: 50 TABLET ORAL at 08:55

## 2017-12-12 RX ADMIN — AMLODIPINE BESYLATE 2.5 MG: 2.5 TABLET ORAL at 08:55

## 2017-12-12 RX ADMIN — LEVETIRACETAM 500 MG: 500 TABLET, FILM COATED ORAL at 08:55

## 2017-12-12 RX ADMIN — Medication 12.5 MG: at 08:56

## 2017-12-12 RX ADMIN — POTASSIUM CHLORIDE 40 MEQ: 1500 TABLET, EXTENDED RELEASE ORAL at 06:59

## 2017-12-12 RX ADMIN — HYDRALAZINE HYDROCHLORIDE 75 MG: 50 TABLET ORAL at 17:22

## 2017-12-12 RX ADMIN — Medication 25 MG: at 21:37

## 2017-12-12 ASSESSMENT — ACTIVITIES OF DAILY LIVING (ADL): PREVIOUS_RESPONSIBILITIES: DRIVING

## 2017-12-12 NOTE — PROGRESS NOTES
Cardiology Progress Note  Shahram Munoz         Assessment and Plan:      Acute diastolic congestive heart failure  Improved on intravenous Lasix drip.  Weight down by 6 pounds.  Echo done today and preliminary report on my review shows low normal ejection fraction with mild pulmonary hypertension.  Likely advanced diastolic dysfunction.  Official report pending.  Troponins remained negative.  I would continue Lasix drip today.  Will reevaluate tomorrow and if fluid status is improved further, may consider right and left heart cath and coronary angiography to assess hemodynamics and rule out obstructive CAD.  He may be a candidate for cardiomegaly his device given he has a small window of euvolemic weights.  Will check CMP today as I want to assess for any hypo gamma globulinemia.    Hypertension  Reasonably well controlled.  Increase losartan as able.  We will stop amlodipine given leg edema and increased losartan.    Hypo natremia  Improving with diuresis.  Sodium now 130 from 123.    Anemia  Hemoglobin 9.7.  MCV is normal.  Will need workup for the anemia and address this as it can precipitate heart failure.  Again we will assess for albumin and globulin levels and make sure there is no hypogammaglobulinemia.  Hospice to assist with this process.                         Interval History:   denies chest pain and denies shortness of breath          Review of Systems:   The 5 point Review of Systems is negative other than noted in the HPI          Physical Exam:        Blood pressure 128/77, pulse 98, temperature 97.9  F (36.6  C), temperature source Oral, resp. rate 16, weight 92.9 kg (204 lb 12.8 oz), SpO2 93 %.  Vitals:    12/11/17 1621 12/12/17 0545   Weight: 95.4 kg (210 lb 6.4 oz) 92.9 kg (204 lb 12.8 oz)     Vital Signs with Ranges  Temp:  [97.5  F (36.4  C)-97.9  F (36.6  C)] 97.9  F (36.6  C)  Pulse:  [58-98] 98  Heart Rate:  [56-67] 58  Resp:  [16-20] 16  BP: (123-159)/(71-88) 128/77  SpO2:  [93 %-97  %] 93 %  I/O's Last 24 hours  I/O last 3 completed shifts:  In: 230.6 [P.O.:200; I.V.:30.6]  Out: 2800 [Urine:2800]    EXAM:    Constitutional:   in no apparent distress     Neck:   elevated JVP, 8-10     Lungs:   symmetric, clear to auscultation     Cardiovascular:   normal S1 and S2 and S4 present     Extremities and Back:   Edema 2+     Neurological:   No gross or focal neurologic abnormalities                        Medications:        amLODIPine  2.5 mg Oral BID     aspirin  81 mg Oral Daily     hydrALAZINE  75 mg Oral TID     carvedilol  50 mg Oral BID w/meals     latanoprost  1 drop Left Eye At Bedtime     losartan  12.5 mg Oral Daily     potassium chloride SA  20 mEq Oral Daily     traZODone  25 mg Oral At Bedtime     levETIRAcetam  500 mg Oral BID            Data:      All new lab and imaging data was reviewed.   Recent Labs   Lab Test  12/12/17   0530  11/09/17   0647  11/08/17   0545   WBC  5.1  9.8  9.8   HGB  9.7*  11.8*  11.6*   MCV  87  85  85   PLT  167  229  225      Recent Labs   Lab Test  12/12/17   0530  12/11/17   1145  12/04/17   0814   NA  130*  123*  129*   POTASSIUM  3.0*  4.2  3.6   CHLORIDE  95  90*  95*   CO2  24  27  27   BUN  18  17  21   CR  1.02  1.15  1.08   ANIONGAP  11  10.2  10.6   ANGEL  8.2*  9.0  9.4   GLC  88  135*  108*     Recent Labs   Lab Test  12/12/17   0530  12/11/17   2325  12/11/17   1705   TROPI  <0.015  <0.015  <0.015              Imaging:   No results found for this or any previous visit (from the past 24 hour(s)).

## 2017-12-12 NOTE — PLAN OF CARE
Problem: Patient Care Overview  Goal: Plan of Care/Patient Progress Review  OT/CR: Eval complete and Tx initiated. Pt admitted for CHF exacerbation. Prior to admit, pt lives in house with wife and reports I with ADLs.     Discharge Planner OT   Patient plan for discharge: Home soon    Current status: Pt required SBA during ambulation of ~400 ft at moderate pace and navigation of 5 stairs. Increased SOB with activity; however, O2 sats stable in low 90s on room air.     Barriers to return to prior living situation: None    Recommendations for discharge: Home with WELS program follow up    Rationale for recommendations: Pt limited by decreased balance and activity tolerance; however, pt doing well. OT/CR will continue with daily intervention to monitor exercise.        Entered by: Lawrence Aguilar 12/12/2017 9:35 AM

## 2017-12-12 NOTE — CONSULTS
Received [facility] Consult from Sigrid VEGA.    Mykel is an established patient in the [facility]. He has been seen by Roxanna Kelly PAC and Hope Che PAC. Ms. Che sent him for direct admission from clinic 12/11/17.     Note Dr. Munoz's plan, ?lasix gtt, ?heart cath.     Arranged for follow up with Hope Che PAC 12/21/17 at 11:30am for BMP and a visit w/ Ms Che at 12:30pm. He also has follow up with Dr. Miranda planned for 1/19/18.     Please order nutrition consult when appropriate. On going HF education is needed for patient and his wife Kristen.    Please call with questions, or if follow up needs change.      Nina Larsen RN, BSN  [facility] Care Coordinator  [phone]      [facility]: Cardiomyopathy, Optimization, Rehabilitation, Education   The [facility] is a heart failure specialty clinic within the [facility] where you will work with your cardiologist, nurse practitioners, physician assistants and registered nurses who specialize in heart failure care. They are dedicated to helping patients with heart failure to carefully adjust medications, receive education, and learn who and when to call if symptoms develop. They specialize in helping you better understand your condition, slow the progression of your disease, improve the length and quality of your life, help you detect future heart problems before they become life threatening, and avoid hospitalizations.
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
## 2017-12-12 NOTE — PLAN OF CARE
Problem: Patient Care Overview  Goal: Plan of Care/Patient Progress Review  Outcome: Improving  A&Ox4. Up with SBA. VSS on RA, CPAP while asleep. Lasix gtt with good output. FISCHER and edema both improving.  Denies pain. Will continue to monitor.

## 2017-12-12 NOTE — PLAN OF CARE
Problem: Cardiac: Heart Failure (Adult)  Goal: Signs and Symptoms of Listed Potential Problems Will be Absent, Minimized or Managed (Cardiac: Heart Failure)  Signs and symptoms of listed potential problems will be absent, minimized or managed by discharge/transition of care (reference Cardiac: Heart Failure (Adult) CPG).   Outcome: No Change  Heart Failure Care Pathway  GOALS TO BE MET BEFORE DISCHARGE:    1. Decrease congestion and/or edema with diuretic therapy to achieve near      optimal volume status.            Dyspnea improved:  Yes            Edema improved:     Yes        Net I/O and Weights since admission:          12/07 0700 - 12/12 0659  In: 230.6 [P.O.:200; I.V.:30.6]  Out: 2800 [Urine:2800]  Net: -2569.4            Vitals:    12/11/17 1621 12/12/17 0545   Weight: 95.4 kg (210 lb 6.4 oz) 92.9 kg (204 lb 12.8 oz)       2.  O2 sats > 92% on RA or at prior home O2 therapy level.          Current oxygenation status:       SpO2: 95 %         O2 Device: BiPAP/CPAP,  Oxygen Delivery: 2 LPM         Able to wean O2 this shift to keep sats > 92%:  No, please explain: CPAP overnight       Does patient use Home O2? No    3.  Tolerates ambulation and mobility near baseline: No, please explain: up to BSC d/t FISCHER        How many times did the patient ambulate with nursing staff this shift? 1    Please review the Heart Failure Care Pathway for additional HF goal outcomes.    Dolores Tirado RN  12/12/2017     Pt A/O. Up to BSC with SBA. VSS on CPAP overnight. Tele: SB, HR 50's. Pt denies pain, reports SOB improved. LS diminished, fine crackles at the bases. +2-3 LE edema. Lasix gtt@5mg/hr. K+ 3.0, replacement initiated per protocol. Plan for echo today.

## 2017-12-12 NOTE — PLAN OF CARE
Problem: Patient Care Overview  Goal: Plan of Care/Patient Progress Review  Outcome: No Change  A&O to self only. Pleasant and cooperative throughout shift with sitter at bedside. Up with SBA, walker. VSS on 2L, denies pain. Will continue to monitor.

## 2017-12-12 NOTE — PROGRESS NOTES
12/12/17 0938   Quick Adds   Type of Visit Initial Occupational Therapy Evaluation   Living Environment   Lives With spouse   Living Arrangements house   Home Accessibility stairs to enter home   Number of Stairs to Enter Home 5   Transportation Available car;family or friend will provide  (Wife does most driving; however, pt still does. )   Self-Care   Usual Activity Tolerance good   Current Activity Tolerance moderate   Equipment Currently Used at Home grab bar   Functional Level Prior   Ambulation 0-->independent   Transferring 0-->independent   Toileting 0-->independent   Bathing 1-->assistive equipment   Dressing 0-->independent   Eating 0-->independent   Communication 0-->understands/communicates without difficulty   Swallowing 0-->swallows foods/liquids without difficulty   Cognition 1 - attention or memory deficits   Fall history within last six months no   General Information   Onset of Illness/Injury or Date of Surgery - Date 12/11/17   Referring Physician SOREN Villanueva PA-C   Patient/Family Goals Statement Pt plans to return home soon   Additional Occupational Profile Info/Pertinent History of Current Problem Admitted for CHF exacerbation. PMH of TBI   Precautions/Limitations fall precautions   Heart Disease Risk Factors Lack of physical activity;Overweight;High blood pressure;Gender;Age   Cognitive Status Examination   Orientation orientation to person, place and time   Level of Consciousness alert   Able to Follow Commands WNL/WFL   Personal Safety (Cognitive) at risk behaviors demonstrated   Cognitive Comment Per chart, baseline cognitive impairment due to TBI.   Visual Perception   Visual Perception Wears glasses   Pain Assessment   Patient Currently in Pain No   Transfer Skill: Sit to Stand   Level of [place]: Sit/Stand stand-by assist   Transfer Skill: Toilet Transfer   Level of [place]: Toilet stand-by assist   Lower Body Dressing   Level of [place]: Dress Lower Body stand-by  "assist   Eating/Self Feeding   Level of [place]: Eating independent   Instrumental Activities of Daily Living (IADL)   Previous Responsibilities driving   IADL Comments Pt's wife manages IADLs, including med mgmt   Activities of Daily Living Analysis   Impairments Contributing to Impaired Activities of Daily Living cognition impaired;balance impaired   ADL Comments Decreased activity tolerance   General Therapy Interventions   Planned Therapy Interventions ADL retraining;transfer training;home program guidelines;progressive activity/exercise;risk factor education   Clinical Impression   Criteria for Skilled Therapeutic Interventions Met yes, treatment indicated   OT Diagnosis Decreased I with ADLs and exercise   Influenced by the following impairments Decreased activity tolerance   Assessment of Occupational Performance 1-3 Performance Deficits   Identified Performance Deficits Dressing; Functional mobility/transfers   Clinical Decision Making (Complexity) Low complexity   Therapy Frequency daily   Predicted Duration of Therapy Intervention (days/wks) 3 days   Anticipated Discharge Disposition Home with Assist   Risks and Benefits of Treatment have been explained. Yes   Patient, Family & other staff in agreement with plan of care Yes   [facility] TM \"6 Clicks\"   2016, Trustees of [facility], under license to [employer].  All rights reserved.   6 Clicks Short Forms Daily Activity Inpatient Short Form   [facility]  \"6 Clicks\" Daily Activity Inpatient Short Form   1. Putting on and taking off regular lower body clothing? 3 - A Little   2. Bathing (including washing, rinsing, drying)? 3 - A Little   3. Toileting, which includes using toilet, bedpan or urinal? 4 - None   4. Putting on and taking off regular upper body clothing? 4 - None   5. Taking care of personal grooming such as brushing teeth? 4 - None   6. Eating meals? 4 - None   Daily Activity Raw Score (Score out of " 24.Lower scores equate to lower levels of function) 22   Total Evaluation Time   Total Evaluation Time (Minutes) 10

## 2017-12-12 NOTE — PLAN OF CARE
Problem: Cardiac: Heart Failure (Adult)  Goal: Signs and Symptoms of Listed Potential Problems Will be Absent, Minimized or Managed (Cardiac: Heart Failure)  Signs and symptoms of listed potential problems will be absent, minimized or managed by discharge/transition of care (reference Cardiac: Heart Failure (Adult) CPG).   Outcome: No Change  Heart Failure Care Pathway  GOALS TO BE MET BEFORE DISCHARGE:     1. Decrease congestion and/or edema with diuretic therapy to achieve near      optimal volume status.            Dyspnea improved:  NO            Edema improved:     NO        Net I/O and Weights since admission:                       Vitals:    12/11/17 1621   Weight: 95.4 kg (210 lb 6.4 oz)       2.  O2 sats > 92% on RA or at prior home O2 therapy level.          Current oxygenation status:       SpO2: 97 %         O2 Device: Nasal cannula,  Oxygen Delivery: 2 LPM         Able to wean O2 this shift to keep sats > 92%: pt has 2l oxygen for comfort.       Does patient use Home O2?no    3.  Tolerates ambulation and mobility near baseline: no        How many times did the patient ambulate with nursing staff this shift? Pt gets SOB on exertion.Pt walked to [facility].    Please review the Heart Failure Care Pathway for additional HF goal outcomes.  Pt is alert oriented.Denies any cp.SOB reported.2 L OXYGEN for comfort. Card seen the pt. Pt receiving IV lasix gtt.5mg/h. Assist of 1 at BS.Echo pending.Pt on CPAP at . Wife at bedside.will continue to monitor.  Tata Parisi RN  12/11/2017

## 2017-12-13 ENCOUNTER — APPOINTMENT (OUTPATIENT)
Dept: OCCUPATIONAL THERAPY | Facility: CLINIC | Age: 67
DRG: 291 | End: 2017-12-13
Attending: HOSPITALIST
Payer: COMMERCIAL

## 2017-12-13 LAB
ALBUMIN SERPL-MCNC: 3 G/DL (ref 3.4–5)
ALP SERPL-CCNC: 63 U/L (ref 40–150)
ALT SERPL W P-5'-P-CCNC: 55 U/L (ref 0–70)
ANION GAP SERPL CALCULATED.3IONS-SCNC: 9 MMOL/L (ref 3–14)
AST SERPL W P-5'-P-CCNC: 15 U/L (ref 0–45)
BILIRUB DIRECT SERPL-MCNC: <0.1 MG/DL (ref 0–0.2)
BILIRUB SERPL-MCNC: 0.4 MG/DL (ref 0.2–1.3)
BUN SERPL-MCNC: 23 MG/DL (ref 7–30)
CALCIUM SERPL-MCNC: 8.4 MG/DL (ref 8.5–10.1)
CHLORIDE SERPL-SCNC: 98 MMOL/L (ref 94–109)
CO2 SERPL-SCNC: 27 MMOL/L (ref 20–32)
CREAT SERPL-MCNC: 1.17 MG/DL (ref 0.66–1.25)
ERYTHROCYTE [DISTWIDTH] IN BLOOD BY AUTOMATED COUNT: 14.2 % (ref 10–15)
FOLATE SERPL-MCNC: 18.7 NG/ML
GFR SERPL CREATININE-BSD FRML MDRD: 62 ML/MIN/1.7M2
GLUCOSE SERPL-MCNC: 88 MG/DL (ref 70–99)
HCT VFR BLD AUTO: 30.6 % (ref 40–53)
HGB BLD-MCNC: 10.2 G/DL (ref 13.3–17.7)
IRON SATN MFR SERPL: 16 % (ref 15–46)
IRON SERPL-MCNC: 36 UG/DL (ref 35–180)
MCH RBC QN AUTO: 29.6 PG (ref 26.5–33)
MCHC RBC AUTO-ENTMCNC: 33.3 G/DL (ref 31.5–36.5)
MCV RBC AUTO: 89 FL (ref 78–100)
PLATELET # BLD AUTO: 184 10E9/L (ref 150–450)
POTASSIUM SERPL-SCNC: 3 MMOL/L (ref 3.4–5.3)
POTASSIUM SERPL-SCNC: 3.9 MMOL/L (ref 3.4–5.3)
PROT SERPL-MCNC: 5.9 G/DL (ref 6.8–8.8)
RBC # BLD AUTO: 3.45 10E12/L (ref 4.4–5.9)
SODIUM SERPL-SCNC: 134 MMOL/L (ref 133–144)
TIBC SERPL-MCNC: 223 UG/DL (ref 240–430)
VIT B12 SERPL-MCNC: 520 PG/ML (ref 193–986)
WBC # BLD AUTO: 5.7 10E9/L (ref 4–11)

## 2017-12-13 PROCEDURE — 25000132 ZZH RX MED GY IP 250 OP 250 PS 637: Performed by: INTERNAL MEDICINE

## 2017-12-13 PROCEDURE — 82607 VITAMIN B-12: CPT | Performed by: INTERNAL MEDICINE

## 2017-12-13 PROCEDURE — 85027 COMPLETE CBC AUTOMATED: CPT | Performed by: INTERNAL MEDICINE

## 2017-12-13 PROCEDURE — 25000128 H RX IP 250 OP 636: Performed by: INTERNAL MEDICINE

## 2017-12-13 PROCEDURE — 21000001 ZZH R&B HEART CARE

## 2017-12-13 PROCEDURE — 84166 PROTEIN E-PHORESIS/URINE/CSF: CPT | Performed by: INTERNAL MEDICINE

## 2017-12-13 PROCEDURE — 83550 IRON BINDING TEST: CPT | Performed by: INTERNAL MEDICINE

## 2017-12-13 PROCEDURE — 83540 ASSAY OF IRON: CPT | Performed by: INTERNAL MEDICINE

## 2017-12-13 PROCEDURE — 99207 ZZC CDG-MDM COMPONENT: MEETS MODERATE - UP CODED: CPT | Performed by: INTERNAL MEDICINE

## 2017-12-13 PROCEDURE — 86335 IMMUNFIX E-PHORSIS/URINE/CSF: CPT | Performed by: INTERNAL MEDICINE

## 2017-12-13 PROCEDURE — 97110 THERAPEUTIC EXERCISES: CPT | Mod: GO

## 2017-12-13 PROCEDURE — 99233 SBSQ HOSP IP/OBS HIGH 50: CPT | Performed by: INTERNAL MEDICINE

## 2017-12-13 PROCEDURE — 00000402 ZZHCL STATISTIC TOTAL PROTEIN: Performed by: INTERNAL MEDICINE

## 2017-12-13 PROCEDURE — 25000132 ZZH RX MED GY IP 250 OP 250 PS 637: Performed by: PHYSICIAN ASSISTANT

## 2017-12-13 PROCEDURE — 80048 BASIC METABOLIC PNL TOTAL CA: CPT | Performed by: INTERNAL MEDICINE

## 2017-12-13 PROCEDURE — 84132 ASSAY OF SERUM POTASSIUM: CPT | Performed by: INTERNAL MEDICINE

## 2017-12-13 PROCEDURE — 82746 ASSAY OF FOLIC ACID SERUM: CPT | Performed by: INTERNAL MEDICINE

## 2017-12-13 PROCEDURE — 84165 PROTEIN E-PHORESIS SERUM: CPT | Performed by: INTERNAL MEDICINE

## 2017-12-13 PROCEDURE — 97535 SELF CARE MNGMENT TRAINING: CPT | Mod: GO

## 2017-12-13 PROCEDURE — 36415 COLL VENOUS BLD VENIPUNCTURE: CPT | Performed by: INTERNAL MEDICINE

## 2017-12-13 PROCEDURE — 80076 HEPATIC FUNCTION PANEL: CPT | Performed by: INTERNAL MEDICINE

## 2017-12-13 PROCEDURE — 40000133 ZZH STATISTIC OT WARD VISIT

## 2017-12-13 RX ORDER — LOSARTAN POTASSIUM 50 MG/1
50 TABLET ORAL DAILY
Status: DISCONTINUED | OUTPATIENT
Start: 2017-12-13 | End: 2017-12-14

## 2017-12-13 RX ADMIN — LOSARTAN POTASSIUM 50 MG: 50 TABLET ORAL at 09:48

## 2017-12-13 RX ADMIN — LATANOPROST 1 DROP: 50 SOLUTION OPHTHALMIC at 20:47

## 2017-12-13 RX ADMIN — LEVETIRACETAM 500 MG: 500 TABLET, FILM COATED ORAL at 20:47

## 2017-12-13 RX ADMIN — HYDRALAZINE HYDROCHLORIDE 75 MG: 50 TABLET ORAL at 20:47

## 2017-12-13 RX ADMIN — ASPIRIN 81 MG: 81 TABLET, COATED ORAL at 09:47

## 2017-12-13 RX ADMIN — HYDRALAZINE HYDROCHLORIDE 75 MG: 50 TABLET ORAL at 18:34

## 2017-12-13 RX ADMIN — Medication 25 MG: at 20:47

## 2017-12-13 RX ADMIN — POTASSIUM CHLORIDE 40 MEQ: 1500 TABLET, EXTENDED RELEASE ORAL at 09:49

## 2017-12-13 RX ADMIN — HYDRALAZINE HYDROCHLORIDE 75 MG: 50 TABLET ORAL at 09:47

## 2017-12-13 RX ADMIN — POTASSIUM CHLORIDE 20 MEQ: 1500 TABLET, EXTENDED RELEASE ORAL at 12:42

## 2017-12-13 RX ADMIN — FUROSEMIDE 5 MG/HR: 10 INJECTION, SOLUTION INTRAVENOUS at 06:10

## 2017-12-13 RX ADMIN — LEVETIRACETAM 500 MG: 500 TABLET, FILM COATED ORAL at 09:48

## 2017-12-13 RX ADMIN — CARVEDILOL 50 MG: 25 TABLET, FILM COATED ORAL at 09:48

## 2017-12-13 RX ADMIN — POTASSIUM CHLORIDE 20 MEQ: 1500 TABLET, EXTENDED RELEASE ORAL at 09:46

## 2017-12-13 RX ADMIN — CARVEDILOL 50 MG: 25 TABLET, FILM COATED ORAL at 18:34

## 2017-12-13 NOTE — PROVIDER NOTIFICATION
MD Notification    Patient was in the shower and slipped. He was able to grab and grab bar and lower himself to the floor. He reports not having hit any part of his body. He does not report being light headed or dizzy at the time of the fall. VS prior 126/71 HR 55 and post fall 108/59 HR 69.     Notified Person:  MD    Notified Persons Name: Dr. Hough and Dr. Munoz    Notification Date/Time: 11:30 12/13/17    Notification Interaction:  Talked with Physician    Purpose of Notification: Fall    Orders Received: No current new orders.
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
## 2017-12-13 NOTE — PLAN OF CARE
Problem: Cardiac: Heart Failure (Adult)  Goal: Signs and Symptoms of Listed Potential Problems Will be Absent, Minimized or Managed (Cardiac: Heart Failure)  Signs and symptoms of listed potential problems will be absent, minimized or managed by discharge/transition of care (reference Cardiac: Heart Failure (Adult) CPG).   Outcome: Improving  Heart Failure Care Pathway  GOALS TO BE MET BEFORE DISCHARGE:    1. Decrease congestion and/or edema with diuretic therapy to achieve near      optimal volume status.            Dyspnea improved:  Yes            Edema improved:     Yes        Net I/O and Weights since admission:          12/07 2300 - 12/12 2259  In: 291.02 [P.O.:200; I.V.:91.02]  Out: 4650 [Urine:4650]  Net: -4358.98            Vitals:    12/11/17 1621 12/12/17 0545   Weight: 95.4 kg (210 lb 6.4 oz) 92.9 kg (204 lb 12.8 oz)       2.  O2 sats > 92% on RA or at prior home O2 therapy level.          Current oxygenation status:       SpO2: 96 %         O2 Device: None (Room air),            Able to wean O2 this shift to keep sats > 92%:  Yes       Does patient use Home O2? No    3.  Tolerates ambulation and mobility near baseline: Yes        How many times did the patient ambulate with nursing staff this shift? 2    Please review the Heart Failure Care Pathway for additional HF goal outcomes.    A&O. VSS on RA. FISCHER. Tele shows SR. No complaint of pain. Lasix drip 5ml/hour. Home CPAP on at night. Wife at bedside. Plan pending diuresis and possible R/L heart cath. Will continue to monitor.     Tonja Woodard RN  12/13/2017

## 2017-12-13 NOTE — CONSULTS
REASON FOR ASSESSMENT:  CHF Consult for 2 gm NA Diet Education    NUTRITION HISTORY:    Visited with pt this afternoon. Pt was with wife. bothe report that they have not had any previous ed.     Living situation:   Lives at home with wife     Grocery shopping:  Pt says that wife does the shopping and the cooking    Meal preparation:  Wife does the cooking. Says she try's really hard to watch the salt.   Makes her own butter from butter, flax seed oil     Breakfast:  typically they don't wake up until 10 so brunch is usually first meal of the day.  Oatmeal, orange slices, sometimes-eggs with toast (dustin bread) w/homemade butter. Coffee -black, milk -skim    Snack: no salt peanuts    Dinner:   Soup, red meat 2x/week, chx, frozen vegetables. Sometimes just  crackers, pb toast.     Previous diet instructions:  None.       CURRENT DIET:  2g Na    NUTRITION DIAGNOSIS:  Food- and nutrition-related knowledge deficit.     INTERVENTIONS:    Nutrition Prescription:  No nutrition intervention at this time    Implementation:    Assessed learning needs, learning preferences, and willingness to learn    Nutrition Education (Content):  a) Provided handouts:  1) Tips for Low Na Diet  2) Label Reading  3) Low Na Foods/Drinks  4) Seasoning Your Food Without Salt  5) Low Na Recipe Booklet  b) Discussed rational for limiting Na for CHF and stressed importance of following 2 gm Na guidelines   c) Encouraged patient to keep a daily food record    Nutrition Education (Application):  a) Discussed current eating habits and recommended alternative food choices    Anticipated good compliance    Diet Education - refer to Education Flowsheet    Goals:    Patient verbalizes understanding of diet.    All of the above goals met during the education session    Follow Up:    Provided RD contact information for future questions.    Recommend Out-Patient Nutrition Referral, if further diet instructions are needed.    Augie Hardy (Dietitic Intern)

## 2017-12-13 NOTE — PLAN OF CARE
Problem: Patient Care Overview  Goal: Plan of Care/Patient Progress Review  Discharge Planner OT   Patient plan for discharge: Home.  Current status: Exercised for 15 min on the NuStep with stable CV response. Education to patient and wife on CHF.   Barriers to return to prior living situation: none.   Recommendations for discharge: Home with WEL.   Rationale for recommendations: Would benefit from continued monitored exercise.       Entered by: Suni Meredith 12/13/2017 8:53 AM

## 2017-12-13 NOTE — PROGRESS NOTES
Cardiology Progress Note  Shahram Munoz         Assessment and Plan:      Acute diastolic congestive heart failure  Improved on intravenous Lasix drip.  Weight down by 6 pounds.  Echo done today and preliminary report on my review shows low normal ejection fraction with mild pulmonary hypertension.  Likely advanced diastolic dysfunction.  Official report pending.  Troponins remained negative.  I would continue Lasix drip today. Decrease to 2.5mg/hour tomorrow. Once Na improves, start aldactone.   Consider right and left heart cath and coronary angiography to assess hemodynamics and rule out obstructive CAD.  He may be a candidate for cardiomems device given he has a small window of euvolemic weights.  May consider right heart biopsy and CMR if increased light chains (see below)    Hypertension  Reasonably well controlled.  Increase losartan to 50mg.      Hyponatremia  Improving with diuresis.  Sodium now 134 from 123 on admission.    Anemia  Hemoglobin 9.7.  MCV is normal.  Hypogammaglobulinemia. Should assess for light chains in serum and urine. Will D/w hospitalist.                        Interval History:   Edema decreasing, weight down          Review of Systems:   The 5 point Review of Systems is negative other than noted in the HPI          Physical Exam:        Blood pressure 124/70, pulse 77, temperature 97.8  F (36.6  C), temperature source Axillary, resp. rate 18, weight 91.2 kg (201 lb 1.6 oz), SpO2 97 %.  Vitals:    12/11/17 1621 12/12/17 0545 12/13/17 0651   Weight: 95.4 kg (210 lb 6.4 oz) 92.9 kg (204 lb 12.8 oz) 91.2 kg (201 lb 1.6 oz)     Vital Signs with Ranges  Temp:  [97.8  F (36.6  C)-99  F (37.2  C)] 97.8  F (36.6  C)  Pulse:  [55-94] 77  Heart Rate:  [58-62] 58  Resp:  [16-18] 18  BP: (118-134)/(64-81) 124/70  SpO2:  [94 %-97 %] 97 %  I/O's Last 24 hours  I/O last 3 completed shifts:  In: 530.42 [P.O.:350; I.V.:180.42]  Out: 3700 [Urine:3700]    EXAM:    Constitutional:   in no apparent  distress     Neck:   elevated JVP, 9     Lungs:   Decreased AE in LZ/bases     Cardiovascular:   normal S1 and S2 and S4 present     Extremities and Back:   Edema 1-2+     Neurological:   No gross or focal neurologic abnormalities                        Medications:        losartan  25 mg Oral Daily     aspirin  81 mg Oral Daily     hydrALAZINE  75 mg Oral TID     carvedilol  50 mg Oral BID w/meals     latanoprost  1 drop Left Eye At Bedtime     potassium chloride SA  20 mEq Oral Daily     traZODone  25 mg Oral At Bedtime     levETIRAcetam  500 mg Oral BID            Data:      All new lab and imaging data was reviewed.   Recent Labs   Lab Test  12/12/17   0530  11/09/17   0647  11/08/17   0545   WBC  5.1  9.8  9.8   HGB  9.7*  11.8*  11.6*   MCV  87  85  85   PLT  167  229  225      Recent Labs   Lab Test  12/13/17   0615  12/12/17   1118  12/12/17   0530  12/11/17   1145   NA  134   --   130*  123*   POTASSIUM  3.0*  3.8  3.0*  4.2   CHLORIDE  98   --   95  90*   CO2  27   --   24  27   BUN  23   --   18  17   CR  1.17   --   1.02  1.15   ANIONGAP  9   --   11  10.2   ANGEL  8.4*   --   8.2*  9.0   GLC  88   --   88  135*     Recent Labs   Lab Test  12/12/17   0530  12/11/17   2325  12/11/17   1705   TROPI  <0.015  <0.015  <0.015              Imaging:   No results found for this or any previous visit (from the past 24 hour(s)).

## 2017-12-14 ENCOUNTER — APPOINTMENT (OUTPATIENT)
Dept: OCCUPATIONAL THERAPY | Facility: CLINIC | Age: 67
DRG: 291 | End: 2017-12-14
Attending: HOSPITALIST
Payer: COMMERCIAL

## 2017-12-14 LAB
ALBUMIN SERPL ELPH-MCNC: 3.6 G/DL (ref 3.7–5.1)
ALPHA1 GLOB SERPL ELPH-MCNC: 0.4 G/DL (ref 0.2–0.4)
ALPHA2 GLOB SERPL ELPH-MCNC: 0.7 G/DL (ref 0.5–0.9)
ANION GAP SERPL CALCULATED.3IONS-SCNC: 10 MMOL/L (ref 3–14)
B-GLOBULIN SERPL ELPH-MCNC: 0.8 G/DL (ref 0.6–1)
BUN SERPL-MCNC: 30 MG/DL (ref 7–30)
CALCIUM SERPL-MCNC: 8.4 MG/DL (ref 8.5–10.1)
CHLORIDE SERPL-SCNC: 100 MMOL/L (ref 94–109)
CO2 SERPL-SCNC: 26 MMOL/L (ref 20–32)
CREAT SERPL-MCNC: 1.5 MG/DL (ref 0.66–1.25)
FERRITIN SERPL-MCNC: 335 NG/ML (ref 26–388)
GAMMA GLOB SERPL ELPH-MCNC: 0.8 G/DL (ref 0.7–1.6)
GFR SERPL CREATININE-BSD FRML MDRD: 47 ML/MIN/1.7M2
GLUCOSE SERPL-MCNC: 93 MG/DL (ref 70–99)
HEMOCCULT STL QL: NEGATIVE
LDH SERPL L TO P-CCNC: 145 U/L (ref 85–227)
M PROTEIN SERPL ELPH-MCNC: 0 G/DL
POTASSIUM SERPL-SCNC: 3.5 MMOL/L (ref 3.4–5.3)
PROT PATTERN SERPL ELPH-IMP: ABNORMAL
RETICS # AUTO: 42.6 10E9/L (ref 25–95)
RETICS/RBC NFR AUTO: 1.2 % (ref 0.5–2)
SODIUM SERPL-SCNC: 136 MMOL/L (ref 133–144)

## 2017-12-14 PROCEDURE — 97110 THERAPEUTIC EXERCISES: CPT | Mod: GO

## 2017-12-14 PROCEDURE — 82728 ASSAY OF FERRITIN: CPT | Performed by: INTERNAL MEDICINE

## 2017-12-14 PROCEDURE — 25000132 ZZH RX MED GY IP 250 OP 250 PS 637: Performed by: INTERNAL MEDICINE

## 2017-12-14 PROCEDURE — 82668 ASSAY OF ERYTHROPOIETIN: CPT | Performed by: INTERNAL MEDICINE

## 2017-12-14 PROCEDURE — 25000128 H RX IP 250 OP 636: Performed by: INTERNAL MEDICINE

## 2017-12-14 PROCEDURE — 36415 COLL VENOUS BLD VENIPUNCTURE: CPT | Performed by: INTERNAL MEDICINE

## 2017-12-14 PROCEDURE — 83615 LACTATE (LD) (LDH) ENZYME: CPT | Performed by: INTERNAL MEDICINE

## 2017-12-14 PROCEDURE — 82272 OCCULT BLD FECES 1-3 TESTS: CPT | Performed by: INTERNAL MEDICINE

## 2017-12-14 PROCEDURE — 40000133 ZZH STATISTIC OT WARD VISIT

## 2017-12-14 PROCEDURE — 83010 ASSAY OF HAPTOGLOBIN QUANT: CPT | Performed by: INTERNAL MEDICINE

## 2017-12-14 PROCEDURE — 85045 AUTOMATED RETICULOCYTE COUNT: CPT | Performed by: INTERNAL MEDICINE

## 2017-12-14 PROCEDURE — 80048 BASIC METABOLIC PNL TOTAL CA: CPT | Performed by: INTERNAL MEDICINE

## 2017-12-14 PROCEDURE — 99232 SBSQ HOSP IP/OBS MODERATE 35: CPT | Performed by: INTERNAL MEDICINE

## 2017-12-14 PROCEDURE — 25000132 ZZH RX MED GY IP 250 OP 250 PS 637: Performed by: PHYSICIAN ASSISTANT

## 2017-12-14 PROCEDURE — 99233 SBSQ HOSP IP/OBS HIGH 50: CPT | Performed by: INTERNAL MEDICINE

## 2017-12-14 PROCEDURE — 21000001 ZZH R&B HEART CARE

## 2017-12-14 RX ADMIN — ASPIRIN 81 MG: 81 TABLET, COATED ORAL at 08:30

## 2017-12-14 RX ADMIN — HYDRALAZINE HYDROCHLORIDE 75 MG: 50 TABLET ORAL at 17:59

## 2017-12-14 RX ADMIN — CARVEDILOL 50 MG: 25 TABLET, FILM COATED ORAL at 08:30

## 2017-12-14 RX ADMIN — LEVETIRACETAM 500 MG: 500 TABLET, FILM COATED ORAL at 08:30

## 2017-12-14 RX ADMIN — LATANOPROST 1 DROP: 50 SOLUTION OPHTHALMIC at 21:36

## 2017-12-14 RX ADMIN — HYDRALAZINE HYDROCHLORIDE 75 MG: 50 TABLET ORAL at 21:36

## 2017-12-14 RX ADMIN — HYDRALAZINE HYDROCHLORIDE 75 MG: 50 TABLET ORAL at 08:42

## 2017-12-14 RX ADMIN — POTASSIUM CHLORIDE 20 MEQ: 1500 TABLET, EXTENDED RELEASE ORAL at 08:30

## 2017-12-14 RX ADMIN — CARVEDILOL 50 MG: 25 TABLET, FILM COATED ORAL at 17:59

## 2017-12-14 RX ADMIN — Medication 25 MG: at 21:36

## 2017-12-14 RX ADMIN — FUROSEMIDE 5 MG/HR: 10 INJECTION, SOLUTION INTRAVENOUS at 02:45

## 2017-12-14 RX ADMIN — LOSARTAN POTASSIUM 50 MG: 50 TABLET ORAL at 08:30

## 2017-12-14 RX ADMIN — LEVETIRACETAM 500 MG: 500 TABLET, FILM COATED ORAL at 21:36
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
## 2017-12-14 NOTE — PLAN OF CARE
Problem: Patient Care Overview  Goal: Plan of Care/Patient Progress Review  Discharge Planner OT   Patient plan for discharge: Home.  Current status: Doing well with exercise on the NuStep. Completes 20 minutes with stable CV response. Would like to see one more session after angio to assess stability with exercise. Note angio planned for Friday.   Barriers to return to prior living situation: None.  Recommendations for discharge: Home with WEL.   Rationale for recommendations: Nearing baseline for exercise.       Entered by: Suni Meredith 12/14/2017 3:24 PM

## 2017-12-14 NOTE — PLAN OF CARE
Problem: Patient Care Overview  Goal: Plan of Care/Patient Progress Review  OT/CR: Chart reviewed, discussed with nursing. Pt pending possible angio today. Per nursing- hold tx session this AM and check on pts status in PM.

## 2017-12-14 NOTE — PLAN OF CARE
Problem: Patient Care Overview  Goal: Plan of Care/Patient Progress Review  Outcome: No Change  Alert and oriented x 4. VS stable, on RA and no complaints of pain. He has +3 LE edema, tyron hose applied. Lasix drip continues with good output. Plan for possible heart cath tomorrow.

## 2017-12-14 NOTE — PROGRESS NOTES
Cardiology Progress Note  Shahram Munoz         Assessment and Plan:      Acute diastolic congestive heart failure  Improved on intravenous Lasix drip.  Weight down by 9 pounds since admit.    Crea increased to 1.57 and therefore, we will stop lasix drip and losartan.  Discontinue fluid restriction for today  Hold off on the  right and left heart cath and coronary angiography to assess hemodynamics and rule out obstructive CAD.  May consider right heart biopsy and CMR if increased light chains (see below)    Hypertension  Reasonably well controlled.  Since crea high, will stop losartan.       Hyponatremia  Improving with diuresis.      Anemia  Hemoglobin 9.7.  MCV is normal.  Hypogammaglobulinemia. Should assess for light chains in serum and urine. Consider Heme consult   D/w hospitalist.                        Interval History:   Edema decreasing, weight down but higher crea          Review of Systems:   The 5 point Review of Systems is negative other than noted in the HPI          Physical Exam:        Blood pressure 125/72, pulse 64, temperature 98.2  F (36.8  C), temperature source Oral, resp. rate 15, weight 91.3 kg (201 lb 4.8 oz), SpO2 94 %.  Vitals:    12/12/17 0545 12/13/17 0651 12/14/17 0652   Weight: 92.9 kg (204 lb 12.8 oz) 91.2 kg (201 lb 1.6 oz) 91.3 kg (201 lb 4.8 oz)     Vital Signs with Ranges  Temp:  [97.3  F (36.3  C)-98.2  F (36.8  C)] 98.2  F (36.8  C)  Pulse:  [64] 64  Heart Rate:  [55-66] 57  Resp:  [14-16] 15  BP: (108-132)/(59-78) 125/72  SpO2:  [94 %-97 %] 94 %  I/O's Last 24 hours  I/O last 3 completed shifts:  In: 1846 [P.O.:1725; I.V.:121]  Out: 3025 [Urine:3025]    EXAM:    Constitutional:   in no apparent distress     Neck:   elevated JVP, 8-10     Lungs:   No rales     Cardiovascular:   normal S1 and S2 and S4 present     Extremities and Back:   Edema 1-2+     Neurological:   No gross or focal neurologic abnormalities                        Medications:        aspirin  81 mg  Oral Daily     hydrALAZINE  75 mg Oral TID     carvedilol  50 mg Oral BID w/meals     latanoprost  1 drop Left Eye At Bedtime     potassium chloride SA  20 mEq Oral Daily     traZODone  25 mg Oral At Bedtime     levETIRAcetam  500 mg Oral BID            Data:      All new lab and imaging data was reviewed.   Recent Labs   Lab Test  12/13/17   0615  12/12/17   0530  11/09/17   0647   WBC  5.7  5.1  9.8   HGB  10.2*  9.7*  11.8*   MCV  89  87  85   PLT  184  167  229      Recent Labs   Lab Test  12/14/17   0615  12/13/17   1405  12/13/17   0615   12/12/17   0530   NA  136   --   134   --   130*   POTASSIUM  3.5  3.9  3.0*   < >  3.0*   CHLORIDE  100   --   98   --   95   CO2  26   --   27   --   24   BUN  30   --   23   --   18   CR  1.50*   --   1.17   --   1.02   ANIONGAP  10   --   9   --   11   ANGEL  8.4*   --   8.4*   --   8.2*   GLC  93   --   88   --   88    < > = values in this interval not displayed.     Recent Labs   Lab Test  12/12/17   0530  12/11/17   2325  12/11/17   1705   TROPI  <0.015  <0.015  <0.015              Imaging:   No results found for this or any previous visit (from the past 24 hour(s)).

## 2017-12-14 NOTE — PLAN OF CARE
Problem: Cardiac: Heart Failure (Adult)  Goal: Signs and Symptoms of Listed Potential Problems Will be Absent, Minimized or Managed (Cardiac: Heart Failure)  Signs and symptoms of listed potential problems will be absent, minimized or managed by discharge/transition of care (reference Cardiac: Heart Failure (Adult) CPG).   Outcome: Improving  Heart Failure Care Pathway  GOALS TO BE MET BEFORE DISCHARGE:    1. Decrease congestion and/or edema with diuretic therapy to achieve near      optimal volume status.            Dyspnea improved:  Yes            Edema improved:     No, please explain: continues to have 3+ in bilateral LE        Net I/O and Weights since admission:          12/08 2300 - 12/13 2259  In: 2261.02 [P.O.:2050; I.V.:211.02]  Out: 7875 [Urine:7875]  Net: -5613.98            Vitals:    12/11/17 1621 12/12/17 0545 12/13/17 0651 12/14/17 0412   Weight: 95.4 kg (210 lb 6.4 oz) 92.9 kg (204 lb 12.8 oz) 91.2 kg (201 lb 1.6 oz) 92.4 kg (203 lb 9.6 oz)       2.  O2 sats > 92% on RA or at prior home O2 therapy level.          Current oxygenation status:       SpO2: 94 %         O2 Device: None (Room air),            Able to wean O2 this shift to keep sats > 92%:  Yes       Does patient use Home O2? No    3.  Tolerates ambulation and mobility near baseline: Yes        How many times did the patient ambulate with nursing staff this shift? 2, up to bathroom independently, tolerates well.     Please review the Heart Failure Care Pathway for additional HF goal outcomes.    VSS, A&O, up independently in room. Pt denies pain and SOB, oxygen stable on RA, wearing CPAP at night. Tele SR, HR 70's. Continue lasix gtt, good urine output. Possible L & R heart cath today, pt NPO since midnight. Will continue to monitory.     Rebecca Zamora RN  12/14/2017
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
## 2017-12-15 LAB
ANION GAP SERPL CALCULATED.3IONS-SCNC: 10 MMOL/L (ref 3–14)
BUN SERPL-MCNC: 37 MG/DL (ref 7–30)
CALCIUM SERPL-MCNC: 8.7 MG/DL (ref 8.5–10.1)
CHLORIDE SERPL-SCNC: 100 MMOL/L (ref 94–109)
CO2 SERPL-SCNC: 25 MMOL/L (ref 20–32)
CREAT SERPL-MCNC: 1.82 MG/DL (ref 0.66–1.25)
EPO SERPL-ACNC: 5 MU/ML (ref 4–27)
ERYTHROCYTE [DISTWIDTH] IN BLOOD BY AUTOMATED COUNT: 14 % (ref 10–15)
GFR SERPL CREATININE-BSD FRML MDRD: 37 ML/MIN/1.7M2
GLUCOSE SERPL-MCNC: 89 MG/DL (ref 70–99)
HCT VFR BLD AUTO: 30.5 % (ref 40–53)
HGB BLD-MCNC: 10.3 G/DL (ref 13.3–17.7)
MCH RBC QN AUTO: 29.8 PG (ref 26.5–33)
MCHC RBC AUTO-ENTMCNC: 33.8 G/DL (ref 31.5–36.5)
MCV RBC AUTO: 88 FL (ref 78–100)
PLATELET # BLD AUTO: 170 10E9/L (ref 150–450)
POTASSIUM SERPL-SCNC: 3.6 MMOL/L (ref 3.4–5.3)
PROT ELPH PNL UR ELPH: NORMAL
PROT PATTERN UR ELPH-IMP: NORMAL
RBC # BLD AUTO: 3.46 10E12/L (ref 4.4–5.9)
SODIUM SERPL-SCNC: 135 MMOL/L (ref 133–144)
WBC # BLD AUTO: 6.7 10E9/L (ref 4–11)

## 2017-12-15 PROCEDURE — 21000001 ZZH R&B HEART CARE

## 2017-12-15 PROCEDURE — 25000132 ZZH RX MED GY IP 250 OP 250 PS 637: Performed by: INTERNAL MEDICINE

## 2017-12-15 PROCEDURE — 99233 SBSQ HOSP IP/OBS HIGH 50: CPT | Performed by: INTERNAL MEDICINE

## 2017-12-15 PROCEDURE — 36415 COLL VENOUS BLD VENIPUNCTURE: CPT | Performed by: INTERNAL MEDICINE

## 2017-12-15 PROCEDURE — 99232 SBSQ HOSP IP/OBS MODERATE 35: CPT | Performed by: INTERNAL MEDICINE

## 2017-12-15 PROCEDURE — 80048 BASIC METABOLIC PNL TOTAL CA: CPT | Performed by: INTERNAL MEDICINE

## 2017-12-15 PROCEDURE — 99207 ZZC CDG-MDM COMPONENT: MEETS MODERATE - UP CODED: CPT | Performed by: INTERNAL MEDICINE

## 2017-12-15 PROCEDURE — 25000132 ZZH RX MED GY IP 250 OP 250 PS 637: Performed by: PHYSICIAN ASSISTANT

## 2017-12-15 PROCEDURE — 85027 COMPLETE CBC AUTOMATED: CPT | Performed by: INTERNAL MEDICINE

## 2017-12-15 RX ORDER — HYDRALAZINE HYDROCHLORIDE 50 MG/1
50 TABLET, FILM COATED ORAL 3 TIMES DAILY
Status: DISCONTINUED | OUTPATIENT
Start: 2017-12-15 | End: 2017-12-17 | Stop reason: HOSPADM

## 2017-12-15 RX ADMIN — HYDRALAZINE HYDROCHLORIDE 75 MG: 50 TABLET ORAL at 08:38

## 2017-12-15 RX ADMIN — HYDRALAZINE HYDROCHLORIDE 50 MG: 50 TABLET ORAL at 21:50

## 2017-12-15 RX ADMIN — HYDRALAZINE HYDROCHLORIDE 50 MG: 50 TABLET ORAL at 16:37

## 2017-12-15 RX ADMIN — LEVETIRACETAM 500 MG: 500 TABLET, FILM COATED ORAL at 08:38

## 2017-12-15 RX ADMIN — Medication 25 MG: at 21:50

## 2017-12-15 RX ADMIN — CARVEDILOL 50 MG: 25 TABLET, FILM COATED ORAL at 18:26

## 2017-12-15 RX ADMIN — ASPIRIN 81 MG: 81 TABLET, COATED ORAL at 08:38

## 2017-12-15 RX ADMIN — CARVEDILOL 50 MG: 25 TABLET, FILM COATED ORAL at 08:37

## 2017-12-15 RX ADMIN — LEVETIRACETAM 500 MG: 500 TABLET, FILM COATED ORAL at 21:50

## 2017-12-15 RX ADMIN — LATANOPROST 1 DROP: 50 SOLUTION OPHTHALMIC at 21:51

## 2017-12-15 NOTE — PLAN OF CARE
Problem: Patient Care Overview  Goal: Plan of Care/Patient Progress Review  Outcome: No Change  Alert and oriented x4. VS stable, on RA and no complaints of pain. +3 LE edema and fine lower lobe lung craskles. Tele SR with rates in the 60's. Plan for possible angio tomorrow and monitor creat.

## 2017-12-15 NOTE — PLAN OF CARE
Problem: Cardiac: Heart Failure (Adult)  Goal: Signs and Symptoms of Listed Potential Problems Will be Absent, Minimized or Managed (Cardiac: Heart Failure)  Signs and symptoms of listed potential problems will be absent, minimized or managed by discharge/transition of care (reference Cardiac: Heart Failure (Adult) CPG).   Outcome: Improving  Remains FISCHER but states much improved since admission.Still has 3+ edema LE. Encouraged to elevate when able. Ace wraps ordered from

## 2017-12-15 NOTE — PROGRESS NOTES
Cardiology Progress Note  Shahram Munoz         Assessment and Plan:      Acute diastolic congestive heart failure  Creatinine increased 1.8 today.  Diuretics and losartan discontinued yesterday.  Will get nephrology consult.  We will continue to hold off on diuresis now.  No coronary angiography of right heart cath for now.    Hypertension  Reasonably well controlled.      Hyponatremia  Improved      Anemia  Hemoglobin 9.7.  MCV is normal.  Heme consult appreciated.  No clear cause noted.  Urine electrophoresis is negative for any evidence of monoclonal protein                     Interval History:   Feels same. Edema persists          Review of Systems:   The 5 point Review of Systems is negative other than noted in the HPI          Physical Exam:        Blood pressure 118/68, pulse 64, temperature 97.5  F (36.4  C), temperature source Oral, resp. rate 18, weight 91.6 kg (202 lb), SpO2 96 %.  Vitals:    12/13/17 0651 12/14/17 0652 12/15/17 0608   Weight: 91.2 kg (201 lb 1.6 oz) 91.3 kg (201 lb 4.8 oz) 91.6 kg (202 lb)     Vital Signs with Ranges  Temp:  [97.5  F (36.4  C)-98.4  F (36.9  C)] 97.5  F (36.4  C)  Heart Rate:  [60-84] 63  Resp:  [16-18] 18  BP: (105-130)/(67-78) 118/68  SpO2:  [96 %-97 %] 96 %  I/O's Last 24 hours  I/O last 3 completed shifts:  In: 1356 [P.O.:1350; I.V.:6]  Out: 2000 [Urine:2000]    EXAM:    Constitutional:   in no apparent distress     Neck:   elevated JVP, 8-10     Lungs:   No rales     Cardiovascular:   normal S1 and S2 and S4 present     Extremities and Back:   Edema 2+     Neurological:   No gross or focal neurologic abnormalities                        Medications:        hydrALAZINE  50 mg Oral TID     sodium chloride (PF)  3 mL Intracatheter Q8H     aspirin  81 mg Oral Daily     carvedilol  50 mg Oral BID w/meals     latanoprost  1 drop Left Eye At Bedtime     traZODone  25 mg Oral At Bedtime     levETIRAcetam  500 mg Oral BID            Data:      All new lab and imaging  data was reviewed.   Recent Labs   Lab Test  12/15/17   0620  12/13/17   0615  12/12/17   0530   WBC  6.7  5.7  5.1   HGB  10.3*  10.2*  9.7*   MCV  88  89  87   PLT  170  184  167      Recent Labs   Lab Test  12/15/17   0620  12/14/17   0615  12/13/17   1405  12/13/17   0615   NA  135  136   --   134   POTASSIUM  3.6  3.5  3.9  3.0*   CHLORIDE  100  100   --   98   CO2  25  26   --   27   BUN  37*  30   --   23   CR  1.82*  1.50*   --   1.17   ANIONGAP  10  10   --   9   ANGEL  8.7  8.4*   --   8.4*   GLC  89  93   --   88     Recent Labs   Lab Test  12/12/17   0530  12/11/17   2325  12/11/17   1705   TROPI  <0.015  <0.015  <0.015              Imaging:   No results found for this or any previous visit (from the past 24 hour(s)).
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
## 2017-12-15 NOTE — PLAN OF CARE
Problem: Cardiac: Heart Failure (Adult)  Goal: Signs and Symptoms of Listed Potential Problems Will be Absent, Minimized or Managed (Cardiac: Heart Failure)  Signs and symptoms of listed potential problems will be absent, minimized or managed by discharge/transition of care (reference Cardiac: Heart Failure (Adult) CPG).   Outcome: No Change  Heart Failure Care Pathway  GOALS TO BE MET BEFORE DISCHARGE:    1. Decrease congestion and/or edema with diuretic therapy to achieve near      optimal volume status.            Dyspnea improved:  Yes            Edema improved:     No, please explain: +3 LE edema        Net I/O and Weights since admission:          12/09 2300 - 12/14 2259  In: 3610.02 [P.O.:3275; I.V.:335.02]  Out: 15048 [Urine:65692]  Net: -7114.98            Vitals:    12/11/17 1621 12/12/17 0545 12/13/17 0651 12/14/17 0652   Weight: 95.4 kg (210 lb 6.4 oz) 92.9 kg (204 lb 12.8 oz) 91.2 kg (201 lb 1.6 oz) 91.3 kg (201 lb 4.8 oz)    12/15/17 0608   Weight: 91.6 kg (202 lb)       2.  O2 sats > 92% on RA or at prior home O2 therapy level.          Current oxygenation status:       SpO2: 96 %         O2 Device: BiPAP/CPAP,            Able to wean O2 this shift to keep sats > 92%:  Yes       Does patient use Home O2? No    3.  Tolerates ambulation and mobility near baseline: Yes        How many times did the patient ambulate with nursing staff this shift? 1    Please review the Heart Failure Care Pathway for additional HF goal outcomes.    Dolores Tirado RN  12/15/2017     A/O. VSS on RA, CPAP overnight. Tele: SR. +3 LE edema. Pt denies pain/SOB. NPO at midnight for possible R/L heart cath.

## 2017-12-15 NOTE — PROGRESS NOTES
Chart check for Dr. Rob. Labs reviewed. No evidence for hemolysis or nutritional deficiency state. No evidence for iron deficiency as well. Creatinine is increased since admission c/w acute kidney injury. This is unlikely to be the basis for the anemia given normal value at admission. Recommend serial monitoring with consideration of marrow biopsy if his anemia worsens.
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
## 2017-12-15 NOTE — PLAN OF CARE
Problem: Patient Care Overview  Goal: Plan of Care/Patient Progress Review  Heart Failure Care Pathway  GOALS TO BE MET BEFORE DISCHARGE:    1. Decrease congestion and/or edema with diuretic therapy to achieve near      optimal volume status.            Dyspnea improved:  Yes            Edema improved:     No, please explain: +3 BLE        Net I/O and Weights since admission:          12/09 1500 - 12/14 1459  In: 3607.02 [P.O.:3275; I.V.:332.02]  Out: 04271 [Urine:30566]  Net: -7117.98            Vitals:    12/11/17 1621 12/12/17 0545 12/13/17 0651 12/14/17 0652   Weight: 95.4 kg (210 lb 6.4 oz) 92.9 kg (204 lb 12.8 oz) 91.2 kg (201 lb 1.6 oz) 91.3 kg (201 lb 4.8 oz)       2.  O2 sats > 92% on RA or at prior home O2 therapy level.          Current oxygenation status:       SpO2: 97 %         O2 Device: None (Room air),            Able to wean O2 this shift to keep sats > 92%:  Yes       Does patient use Home O2? No    3.  Tolerates ambulation and mobility near baseline: Yes        How many times did the patient ambulate with nursing staff this shift? 1    Please review the Heart Failure Care Pathway for additional HF goal outcomes.  Alert and oriented x4. VS stable, . +3 LE edema, LS diminished bilaterally. Denies pain and SOB. Tele SR with rates in the 60's. Plan for possible angio tomorrow, NPO at Midnight. CTM    Alva Monzon RN  12/14/2017

## 2017-12-16 ENCOUNTER — APPOINTMENT (OUTPATIENT)
Dept: PHYSICAL THERAPY | Facility: CLINIC | Age: 67
DRG: 291 | End: 2017-12-16
Attending: INTERNAL MEDICINE
Payer: COMMERCIAL

## 2017-12-16 ENCOUNTER — APPOINTMENT (OUTPATIENT)
Dept: OCCUPATIONAL THERAPY | Facility: CLINIC | Age: 67
DRG: 291 | End: 2017-12-16
Attending: HOSPITALIST
Payer: COMMERCIAL

## 2017-12-16 LAB
ANION GAP SERPL CALCULATED.3IONS-SCNC: 5 MMOL/L (ref 3–14)
BUN SERPL-MCNC: 28 MG/DL (ref 7–30)
CALCIUM SERPL-MCNC: 8.7 MG/DL (ref 8.5–10.1)
CHLORIDE SERPL-SCNC: 99 MMOL/L (ref 94–109)
CO2 SERPL-SCNC: 29 MMOL/L (ref 20–32)
CREAT SERPL-MCNC: 1.32 MG/DL (ref 0.66–1.25)
GFR SERPL CREATININE-BSD FRML MDRD: 54 ML/MIN/1.7M2
GLUCOSE SERPL-MCNC: 94 MG/DL (ref 70–99)
HAPTOGLOB SERPL-MCNC: 166 MG/DL (ref 35–175)
POTASSIUM SERPL-SCNC: 3.9 MMOL/L (ref 3.4–5.3)
SODIUM SERPL-SCNC: 133 MMOL/L (ref 133–144)

## 2017-12-16 PROCEDURE — 97110 THERAPEUTIC EXERCISES: CPT | Mod: GO

## 2017-12-16 PROCEDURE — 25000132 ZZH RX MED GY IP 250 OP 250 PS 637: Performed by: INTERNAL MEDICINE

## 2017-12-16 PROCEDURE — 99232 SBSQ HOSP IP/OBS MODERATE 35: CPT | Performed by: INTERNAL MEDICINE

## 2017-12-16 PROCEDURE — 97140 MANUAL THERAPY 1/> REGIONS: CPT | Mod: GP | Performed by: PHYSICAL THERAPIST

## 2017-12-16 PROCEDURE — 21000001 ZZH R&B HEART CARE

## 2017-12-16 PROCEDURE — 25000132 ZZH RX MED GY IP 250 OP 250 PS 637: Performed by: PHYSICIAN ASSISTANT

## 2017-12-16 PROCEDURE — 36415 COLL VENOUS BLD VENIPUNCTURE: CPT | Performed by: INTERNAL MEDICINE

## 2017-12-16 PROCEDURE — 40000193 ZZH STATISTIC PT WARD VISIT: Performed by: PHYSICAL THERAPIST

## 2017-12-16 PROCEDURE — 99207 ZZC CDG-CHARGE REQUIRED MANUAL ENTRY: CPT | Performed by: INTERNAL MEDICINE

## 2017-12-16 PROCEDURE — 40000133 ZZH STATISTIC OT WARD VISIT

## 2017-12-16 PROCEDURE — 80048 BASIC METABOLIC PNL TOTAL CA: CPT | Performed by: INTERNAL MEDICINE

## 2017-12-16 PROCEDURE — 97161 PT EVAL LOW COMPLEX 20 MIN: CPT | Mod: GP | Performed by: PHYSICAL THERAPIST

## 2017-12-16 RX ORDER — TORSEMIDE 20 MG/1
20 TABLET ORAL DAILY
Status: DISCONTINUED | OUTPATIENT
Start: 2017-12-16 | End: 2017-12-17 | Stop reason: HOSPADM

## 2017-12-16 RX ADMIN — LEVETIRACETAM 500 MG: 500 TABLET, FILM COATED ORAL at 20:31

## 2017-12-16 RX ADMIN — LATANOPROST 1 DROP: 50 SOLUTION OPHTHALMIC at 21:48

## 2017-12-16 RX ADMIN — CARVEDILOL 50 MG: 25 TABLET, FILM COATED ORAL at 09:42

## 2017-12-16 RX ADMIN — TORSEMIDE 20 MG: 20 TABLET ORAL at 12:23

## 2017-12-16 RX ADMIN — LEVETIRACETAM 500 MG: 500 TABLET, FILM COATED ORAL at 09:42

## 2017-12-16 RX ADMIN — HYDRALAZINE HYDROCHLORIDE 50 MG: 50 TABLET ORAL at 09:42

## 2017-12-16 RX ADMIN — HYDRALAZINE HYDROCHLORIDE 50 MG: 50 TABLET ORAL at 21:47

## 2017-12-16 RX ADMIN — CARVEDILOL 50 MG: 25 TABLET, FILM COATED ORAL at 18:29

## 2017-12-16 RX ADMIN — ASPIRIN 81 MG: 81 TABLET, COATED ORAL at 09:42

## 2017-12-16 RX ADMIN — HYDRALAZINE HYDROCHLORIDE 50 MG: 50 TABLET ORAL at 16:01

## 2017-12-16 RX ADMIN — Medication 25 MG: at 21:47

## 2017-12-16 NOTE — PLAN OF CARE
Problem: Patient Care Overview  Goal: Plan of Care/Patient Progress Review  Discharge Planner PT    Lymphedema: Pt appropriate for quick wrap style short stretch bandage placement. Educated pt's wife on don/doff and schedule. Provided written handout for don/doff , signs and symptoms of intolerance, wash instructions and optimal follow up.   Patient plan for discharge: Home  Current status: Presents with 3+ pitting, foot to just below the knees. Thighs with trace edema, edema is focused below the knees.  Reports this began just 3-4 weeks ago, worse in the last 2.  Tolerated wash, dry, lotion application and don of short stretch bandages with ease. Provided wife with visual demo and instructions for home.   Barriers to return to prior living situation: None.  Recommendations for discharge:  Pt would benefit from continued lymphedema cares to assist with management of LE volume at discharge.  Rationale for recommendations: See above.        Entered by: Maria Sylvester 12/16/2017 12:42 PM
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
## 2017-12-16 NOTE — PROGRESS NOTES
Chart check for Dr. Rbo. Labs reviewed. No evidence for hemolysis or nutritional deficiency state. No monoclonal paraprotein either in the serum or urine.  H&H stable for now.  Agree with monitoring H&H and would offer additional work up with bone marrow studies if needed.  This can be done as an outpatient.

## 2017-12-16 NOTE — PLAN OF CARE
Problem: Patient Care Overview  Goal: Plan of Care/Patient Progress Review  Outcome: Improving  Heart Failure Care Pathway  GOALS TO BE MET BEFORE DISCHARGE:    1. Decrease congestion and/or edema with diuretic therapy to achieve near      optimal volume status.            Dyspnea improved:  Yes            Edema improved:     No, please explain: LLE still has +3 edema, while RLE has +2 edema, lymphedema wraps on.        Net I/O and Weights since admission:          12/11 1500 - 12/16 1459  In: 4738.02 [P.O.:4400; I.V.:338.02]  Out: 18172 [Urine:66956]  Net: -35857.98            Vitals:    12/11/17 1621 12/12/17 0545 12/13/17 0651 12/14/17 0652   Weight: 95.4 kg (210 lb 6.4 oz) 92.9 kg (204 lb 12.8 oz) 91.2 kg (201 lb 1.6 oz) 91.3 kg (201 lb 4.8 oz)    12/15/17 0608 12/16/17 0500   Weight: 91.6 kg (202 lb) 92.4 kg (203 lb 9.6 oz)       2.  O2 sats > 92% on RA or at prior home O2 therapy level.          Current oxygenation status:       SpO2: 96 %         O2 Device: None (Room air),            Able to wean O2 this shift to keep sats > 92%:  NA       Does patient use Home O2? No    3.  Tolerates ambulation and mobility near baseline: Yes        How many times did the patient ambulate with nursing staff this shift? 1    Please review the Heart Failure Care Pathway for additional HF goal outcomes.    Nereyda Mann RN  12/16/2017

## 2017-12-16 NOTE — PLAN OF CARE
Problem: Cardiac: Heart Failure (Adult)  Goal: Signs and Symptoms of Listed Potential Problems Will be Absent, Minimized or Managed (Cardiac: Heart Failure)  Signs and symptoms of listed potential problems will be absent, minimized or managed by discharge/transition of care (reference Cardiac: Heart Failure (Adult) CPG).   Heart Failure Care Pathway  GOALS TO BE MET BEFORE DISCHARGE:    1. Decrease congestion and/or edema with diuretic therapy to achieve near      optimal volume status.            Dyspnea improved:  Yes            Edema improved:     Yes        Net I/O and Weights since admission:          12/10 2300 - 12/15 2259  In: 4013.02 [P.O.:3675; I.V.:338.02]  Out: 89359 [Urine:25772]  Net: -8211.98            Vitals:    12/11/17 1621 12/12/17 0545 12/13/17 0651 12/14/17 0652   Weight: 95.4 kg (210 lb 6.4 oz) 92.9 kg (204 lb 12.8 oz) 91.2 kg (201 lb 1.6 oz) 91.3 kg (201 lb 4.8 oz)    12/15/17 0608   Weight: 91.6 kg (202 lb)       2.  O2 sats > 92% on RA or at prior home O2 therapy level.          Current oxygenation status:       SpO2: 96 %         O2 Device: BiPAP/CPAP,            Able to wean O2 this shift to keep sats > 92%:  Room air, CPAP overnight       Does patient use Home O2? No    3.  Tolerates ambulation and mobility near baseline: Yes        How many times did the patient ambulate with nursing staff this shift? 0, pt ambulated to bathroom     Please review the Heart Failure Care Pathway for additional HF goal outcomes.    Maribel Faulkner RN  12/16/2017       VSS. Alert & oriented. Tele: SB/SR; HR 50s-60s. Room air, CPAP overnight. SBA. Denies pain. Mild- moderate edema in LE. Wife at bedside. Plan: possible angio Monday. Will continue to monitor.

## 2017-12-16 NOTE — PROGRESS NOTES
Pt up in room and halls today with wife. Sl FISCHER but states much improved from admission. Angiogram on hold d/t elevated creatinine. VSS.
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
## 2017-12-16 NOTE — PROVIDER NOTIFICATION
Hospitalist Dr. De La Rosa wants to discharge patient, start torsemide today, and follow up with labs on Mon.  are you ok with this? lorraine lazo

## 2017-12-16 NOTE — PLAN OF CARE
Problem: Patient Care Overview  Goal: Plan of Care/Patient Progress Review  OT/cardiac rehab.  Discharge Planner OT   Patient plan for discharge:  Home w/spouse at dc, may need OPCR  Current status: Ambulating indep in halls, tolerated 20min on Nustep today at 2.8 MET LEVEL maintaining stable cardiac response, denied symptoms. Pt has met all current inpatient CR goals and will be placed on hold w/plan for CR to resume post-op (angio scheduled for Monday). Pt will cont to amb independently  Barriers to return to prior living situation: none noted  Recommendations for discharge: anticipate home w/spouse and OPCR w/ further recommendation to made post-op  Rationale for recommendations: angio scheduled for Monday 12/18       Entered by: Sina Fernandez 12/16/2017 10:59 AM

## 2017-12-16 NOTE — PROGRESS NOTES
[facility]     Renal Progress Note       SHORTHAND KEY FOR MY NOTES:  c = with, s = without, p = after, a = before, x = except, asx = asymptomatic, tx = transplant or treatment, sx = symptoms or symptomatic, cx = canceled or culture, rxn = reaction, yday = yesterday, nl = normal, abx = antibiotics, fxn = function, dx = diagnosis, dz = disease, m/h = melena/hematochezia, c/d/l/ha = cramping/dizziness/lightheadedness/headache, d/c = discharge or diarrhea/constipation, f/c/n/v = fevers/chills/nausea/vomiting, cp/sob = chest pain/shortness of breath.         Assessment/Plan:     1.  JESSE.  Pt's cr is better today c holding of ARB.  He is resuming diuretics today (tors) due to his increased TBV.  A.  Ok c tors.  B.  Agree c holding ARB.  C.  Monitor labs, uo, sx daily.    2.  CHF.  Pt is diuresing and is on hydral and carvedilol.  Followed by Cards.  A.  Continue same plan.  B.  Monitor clinically.  C.  Agree c holding on cath for now.    3.  FEN.  Na is low, but better.  He is TBV up and c the loop diuretic should improve.  A.  Same plan.        Interval History:     Pt is feeling fine.  Denies any n/v/itching.  No f/c.  No cp/sob.  Urinating ok.           Medications and Allergies:       torsemide  20 mg Oral Daily     hydrALAZINE  50 mg Oral TID     sodium chloride (PF)  3 mL Intracatheter Q8H     aspirin  81 mg Oral Daily     carvedilol  50 mg Oral BID w/meals     latanoprost  1 drop Left Eye At Bedtime     traZODone  25 mg Oral At Bedtime     levETIRAcetam  500 mg Oral BID     Allergies   Allergen Reactions     No Known Drug Allergies           Physical Exam:     Vitals were reviewed    Heart Rate: 61, Blood pressure 126/71, pulse 64, temperature 97.6  F (36.4  C), temperature source Oral, resp. rate 18, weight 92.4 kg (203 lb 9.6 oz), SpO2 96 %.  Wt Readings from Last 3 Encounters:   12/16/17 92.4 kg (203 lb 9.6 oz)   12/11/17 95.5 kg (210 lb 8 oz)   12/04/17 93.4 kg (206 lb)     Intake/Output  Summary (Last 24 hours) at 12/16/17 1200  Last data filed at 12/16/17 1002   Gross per 24 hour   Intake              295 ml   Output             3225 ml   Net            -2930 ml     GENERAL APPEARANCE: pleasant, NAD, alert  HEENT:  Eyes/ears/nose/neck grossly normal x R eye is abn  RESP: lungs cta b c good efforts, no crackles  CV: RRR c 2/6 m, nl S1/S2  ABDOMEN: o/s/nt/nd  EXTREMITIES/SKIN: 2+ edema to his thighs         Data:     CBC RESULTS:     Recent Labs  Lab 12/15/17  0620 12/13/17  0615 12/12/17  0530   WBC 6.7 5.7 5.1   RBC 3.46* 3.45* 3.25*   HGB 10.3* 10.2* 9.7*   HCT 30.5* 30.6* 28.4*    184 167     Basic Metabolic Panel:    Recent Labs  Lab 12/16/17  0618 12/15/17  0620 12/14/17  0615 12/13/17  1405 12/13/17  0615 12/12/17  1118 12/12/17  0530 12/11/17  1145    135 136  --  134  --  130* 123*   POTASSIUM 3.9 3.6 3.5 3.9 3.0* 3.8 3.0* 4.2   CHLORIDE 99 100 100  --  98  --  95 90*   CO2 29 25 26  --  27  --  24 27   BUN 28 37* 30  --  23  --  18 17   CR 1.32* 1.82* 1.50*  --  1.17  --  1.02 1.15   GLC 94 89 93  --  88  --  88 135*   ANGEL 8.7 8.7 8.4*  --  8.4*  --  8.2* 9.0     INRNo lab results found in last 7 days.   Attestation:   I have reviewed today's relevant vital signs, notes, medications, labs and imaging.    Carlo Frederick MD  [facility] - Nephrology  [phone]

## 2017-12-16 NOTE — PROGRESS NOTES
Cardiology Progress Note  Shahram Munoz         Assessment and Plan:      Acute diastolic congestive heart failure  Diuresis was complicated by acute renal insufficiency.  Therefore Lasix drip and losartan were discontinued 2 days ago  Creatinine decreased to 1.3 from 1.8 today.  Suggest starting oral diuretics in the form of torsemide 20 mg daily.  Will hold off on losartan for now.  Recent stress nuclear study was negative for ischemia.  EF is 50-55%.  Given recent renal failure, with holding off coronary angiography which was initially considered to make sure there is no significant CAD.  Patient may benefit from cardiac MRI as an outpatient to assess for any infiltrative disease although urine electrophoresis showed no evidence of monoclonal protein or light chains.  This patient will benefit from Cardiomems device after discharge.    Hypertension  Reasonably well controlled.      Hyponatremia  Improved      Anemia  Hemoglobin 9.7.  MCV is normal.  Heme consult appreciated.  No clear cause noted.  Urine electrophoresis is negative for any evidence of monoclonal protein.  Will need follow-up with them.    Plan  Start torsemide 20 mg daily if okay with nephrology.                   Interval History:   Feels same. Edema persists          Review of Systems:   The 5 point Review of Systems is negative other than noted in the HPI          Physical Exam:        Blood pressure 120/72, pulse 64, temperature 97.3  F (36.3  C), temperature source Oral, resp. rate 18, weight 92.4 kg (203 lb 9.6 oz), SpO2 97 %.  Vitals:    12/14/17 0652 12/15/17 0608 12/16/17 0500   Weight: 91.3 kg (201 lb 4.8 oz) 91.6 kg (202 lb) 92.4 kg (203 lb 9.6 oz)     Vital Signs with Ranges  Temp:  [97.3  F (36.3  C)-97.9  F (36.6  C)] 97.3  F (36.3  C)  Heart Rate:  [53-63] 57  Resp:  [18] 18  BP: (118-137)/(53-80) 120/72  SpO2:  [95 %-97 %] 97 %  I/O's Last 24 hours  I/O last 3 completed shifts:  In: 75 [P.O.:75]  Out: 9035  [Urine:2525]    EXAM:    Constitutional:   in no apparent distress     Neck:   elevated JVP, 8-10     Lungs:   No rales     Cardiovascular:   normal S1 and S2 and S4 present     Extremities and Back:   Edema 2+     Neurological:   No gross or focal neurologic abnormalities                        Medications:        hydrALAZINE  50 mg Oral TID     sodium chloride (PF)  3 mL Intracatheter Q8H     aspirin  81 mg Oral Daily     carvedilol  50 mg Oral BID w/meals     latanoprost  1 drop Left Eye At Bedtime     traZODone  25 mg Oral At Bedtime     levETIRAcetam  500 mg Oral BID            Data:      All new lab and imaging data was reviewed.   Recent Labs   Lab Test  12/15/17   0620  12/13/17   0615  12/12/17   0530   WBC  6.7  5.7  5.1   HGB  10.3*  10.2*  9.7*   MCV  88  89  87   PLT  170  184  167      Recent Labs   Lab Test  12/16/17   0618  12/15/17   0620  12/14/17   0615   NA  133  135  136   POTASSIUM  3.9  3.6  3.5   CHLORIDE  99  100  100   CO2  29  25  26   BUN  28  37*  30   CR  1.32*  1.82*  1.50*   ANIONGAP  5  10  10   ANGEL  8.7  8.7  8.4*   GLC  94  89  93     Recent Labs   Lab Test  12/12/17   0530  12/11/17   2325  12/11/17   1705   TROPI  <0.015  <0.015  <0.015          Imaging:   No results found for this or any previous visit (from the past 24 hour(s)).

## 2017-12-16 NOTE — PLAN OF CARE
Problem: Cardiac: Heart Failure (Adult)  Goal: Signs and Symptoms of Listed Potential Problems Will be Absent, Minimized or Managed (Cardiac: Heart Failure)  Signs and symptoms of listed potential problems will be absent, minimized or managed by discharge/transition of care (reference Cardiac: Heart Failure (Adult) CPG).   Outcome: Improving  Heart Failure Care Pathway  GOALS TO BE MET BEFORE DISCHARGE:    1. Decrease congestion and/or edema with diuretic therapy to achieve near      optimal volume status.            Dyspnea improved:  Yes            Edema improved:     Yes        Net I/O and Weights since admission:          12/11 0700 - 12/16 0659  In: 4038.02 [P.O.:3700; I.V.:338.02]  Out: 52172 [Urine:06230]  Net: -51364.98            Vitals:    12/11/17 1621 12/12/17 0545 12/13/17 0651 12/14/17 0652   Weight: 95.4 kg (210 lb 6.4 oz) 92.9 kg (204 lb 12.8 oz) 91.2 kg (201 lb 1.6 oz) 91.3 kg (201 lb 4.8 oz)    12/15/17 0608 12/16/17 0500   Weight: 91.6 kg (202 lb) 92.4 kg (203 lb 9.6 oz)       2.  O2 sats > 92% on RA or at prior home O2 therapy level.          Current oxygenation status:       SpO2: 94 %         O2 Device: None (Room air),            Able to wean O2 this shift to keep sats > 92%:  Yes       Does patient use Home O2? No  Yes  3.  Tolerates ambulation and mobility near baseline:         How many times did the patient ambulate with nursing staff this shift? 3    Please review the Heart Failure Care Pathway for additional HF goal outcomes.    Bernadette Laura RN  12/16/2017

## 2017-12-17 ENCOUNTER — APPOINTMENT (OUTPATIENT)
Dept: PHYSICAL THERAPY | Facility: CLINIC | Age: 67
DRG: 291 | End: 2017-12-17
Attending: HOSPITALIST
Payer: COMMERCIAL

## 2017-12-17 VITALS
WEIGHT: 202.2 LBS | TEMPERATURE: 98 F | DIASTOLIC BLOOD PRESSURE: 76 MMHG | HEART RATE: 64 BPM | SYSTOLIC BLOOD PRESSURE: 139 MMHG | OXYGEN SATURATION: 95 % | BODY MASS INDEX: 31.67 KG/M2 | RESPIRATION RATE: 18 BRPM

## 2017-12-17 LAB
ANION GAP SERPL CALCULATED.3IONS-SCNC: 9 MMOL/L (ref 3–14)
BUN SERPL-MCNC: 23 MG/DL (ref 7–30)
CALCIUM SERPL-MCNC: 8.4 MG/DL (ref 8.5–10.1)
CHLORIDE SERPL-SCNC: 101 MMOL/L (ref 94–109)
CO2 SERPL-SCNC: 28 MMOL/L (ref 20–32)
CREAT SERPL-MCNC: 1.09 MG/DL (ref 0.66–1.25)
GFR SERPL CREATININE-BSD FRML MDRD: 68 ML/MIN/1.7M2
GLUCOSE SERPL-MCNC: 92 MG/DL (ref 70–99)
POTASSIUM SERPL-SCNC: 3.6 MMOL/L (ref 3.4–5.3)
SODIUM SERPL-SCNC: 138 MMOL/L (ref 133–144)

## 2017-12-17 PROCEDURE — 25000128 H RX IP 250 OP 636: Performed by: INTERNAL MEDICINE

## 2017-12-17 PROCEDURE — 40000193 ZZH STATISTIC PT WARD VISIT: Performed by: PHYSICAL THERAPIST

## 2017-12-17 PROCEDURE — 25000132 ZZH RX MED GY IP 250 OP 250 PS 637: Performed by: INTERNAL MEDICINE

## 2017-12-17 PROCEDURE — 99232 SBSQ HOSP IP/OBS MODERATE 35: CPT | Performed by: INTERNAL MEDICINE

## 2017-12-17 PROCEDURE — 99239 HOSP IP/OBS DSCHRG MGMT >30: CPT | Performed by: INTERNAL MEDICINE

## 2017-12-17 PROCEDURE — 80048 BASIC METABOLIC PNL TOTAL CA: CPT | Performed by: INTERNAL MEDICINE

## 2017-12-17 PROCEDURE — 97140 MANUAL THERAPY 1/> REGIONS: CPT | Mod: GP | Performed by: PHYSICAL THERAPIST

## 2017-12-17 PROCEDURE — 94660 CPAP INITIATION&MGMT: CPT

## 2017-12-17 PROCEDURE — 25000132 ZZH RX MED GY IP 250 OP 250 PS 637: Performed by: PHYSICIAN ASSISTANT

## 2017-12-17 PROCEDURE — 36415 COLL VENOUS BLD VENIPUNCTURE: CPT | Performed by: INTERNAL MEDICINE

## 2017-12-17 PROCEDURE — 40000275 ZZH STATISTIC RCP TIME EA 10 MIN

## 2017-12-17 RX ORDER — HYDRALAZINE HYDROCHLORIDE 50 MG/1
50 TABLET, FILM COATED ORAL 3 TIMES DAILY
Qty: 90 TABLET | Refills: 0 | Status: SHIPPED | OUTPATIENT
Start: 2017-12-17 | End: 2018-05-11

## 2017-12-17 RX ORDER — FUROSEMIDE 10 MG/ML
20 INJECTION INTRAMUSCULAR; INTRAVENOUS ONCE
Status: COMPLETED | OUTPATIENT
Start: 2017-12-17 | End: 2017-12-17

## 2017-12-17 RX ORDER — TORSEMIDE 20 MG/1
20 TABLET ORAL DAILY
Qty: 30 TABLET | Refills: 0 | Status: SHIPPED | OUTPATIENT
Start: 2017-12-18 | End: 2017-12-21

## 2017-12-17 RX ADMIN — FUROSEMIDE 20 MG: 10 INJECTION, SOLUTION INTRAMUSCULAR; INTRAVENOUS at 12:20

## 2017-12-17 RX ADMIN — ASPIRIN 81 MG: 81 TABLET, COATED ORAL at 08:32

## 2017-12-17 RX ADMIN — CARVEDILOL 50 MG: 25 TABLET, FILM COATED ORAL at 08:32

## 2017-12-17 RX ADMIN — HYDRALAZINE HYDROCHLORIDE 50 MG: 50 TABLET ORAL at 08:32

## 2017-12-17 RX ADMIN — TORSEMIDE 20 MG: 20 TABLET ORAL at 08:32

## 2017-12-17 RX ADMIN — LEVETIRACETAM 500 MG: 500 TABLET, FILM COATED ORAL at 08:32

## 2017-12-17 NOTE — PLAN OF CARE
Problem: Cardiac: Heart Failure (Adult)  Goal: Signs and Symptoms of Listed Potential Problems Will be Absent, Minimized or Managed (Cardiac: Heart Failure)  Signs and symptoms of listed potential problems will be absent, minimized or managed by discharge/transition of care (reference Cardiac: Heart Failure (Adult) CPG).   Outcome: Improving  Heart Failure Care Pathway  GOALS TO BE MET BEFORE DISCHARGE:    1. Decrease congestion and/or edema with diuretic therapy to achieve near      optimal volume status.            Dyspnea improved:  Yes            Edema improved:     LLE has +3 edema, while RLE has +2 edema, lymphedema wraps on.        Net I/O and Weights since admission:          12/11 2300 - 12/16 2259  In: 5521.02 [P.O.:5180; I.V.:341.02]  Out: 90328 [Urine:72999]  Net: -45421.98            Vitals:    12/11/17 1621 12/12/17 0545 12/13/17 0651 12/14/17 0652   Weight: 95.4 kg (210 lb 6.4 oz) 92.9 kg (204 lb 12.8 oz) 91.2 kg (201 lb 1.6 oz) 91.3 kg (201 lb 4.8 oz)    12/15/17 0608 12/16/17 0500 12/17/17 0348   Weight: 91.6 kg (202 lb) 92.4 kg (203 lb 9.6 oz) 91.5 kg (201 lb 12.8 oz)       2.  O2 sats > 92% on RA or at prior home O2 therapy level.          Current oxygenation status:       SpO2: 96 %         O2 Device: None (Room air),            Able to wean O2 this shift to keep sats > 92%:  NA       Does patient use Home O2? No    3.  Tolerates ambulation and mobility near baseline: Yes        How many times did the patient ambulate with nursing staff this shift? 0    Pt A&Ox4, VSS. Pt denies CP, sob, dizziness. Pt SBA w/ wife in room.     Please review the Heart Failure Care Pathway for additional HF goal outcomes.LLE still has +3 edema, while RLE has +2 edema, lymphedema wraps on. Pt appears to be resting comforltably at this time, will continue to monitor.    Elan Pace RN  12/17/2017

## 2017-12-17 NOTE — PROGRESS NOTES
Cardiology Progress Note  Shahram Munoz         Assessment and Plan:      Acute diastolic congestive heart failure  Diuresis was complicated by acute renal insufficiency.  Therefore Lasix drip and losartan were discontinued 3days ago  Creatinine now normal  Started on  torsemide 20 mg daily yesterday.  Will hold off on losartan for now.  Recent stress nuclear study was negative for ischemia.  EF is 50-55%.  Given recent renal failure, with holding off coronary angiography which was initially considered to make sure there is no significant CAD.  Patient may benefit from cardiac MRI as an outpatient to assess for any infiltrative disease although urine electrophoresis showed no evidence of monoclonal protein or light chains.  This patient will benefit from Cardiomems device after discharge.  Patient wants to go home and will be discharged today.  He is 8 pounds lighter than when he came in.  He still has edema.  There may be an element of venous insufficiency.      Hypertension  Reasonably well controlled.      Hyponatremia  Improved      Anemia  Hemoglobin 9.7.  MCV is normal.  Heme consult appreciated.  No clear cause noted.  Urine electrophoresis is negative for any evidence of monoclonal protein.  Will need follow-up with them.  Should follow with hematology if  hemoglobin remains low.    Plan  Home today with oral torsemide 20 mg daily.  Has core clinic appointment on Thursday already set up.  I have asked hospice to give 1 dose of IV Lasix before he leaves.  If creatinine remains stable, he may be a candidate for aldosterone inhibitor as an outpatient.  Suggest venous ultrasound for venous insufficiency as an outpatient  Consider cardiomems as an outpatient                   Interval History:   Symptoms improved. Edema decreased          Review of Systems:   The 5 point Review of Systems is negative other than noted in the HPI          Physical Exam:        Blood pressure 138/78, pulse 64, temperature 97.3   F (36.3  C), temperature source Oral, resp. rate 18, weight 91.7 kg (202 lb 3.2 oz), SpO2 94 %.  Vitals:    12/16/17 0500 12/17/17 0348 12/17/17 0724   Weight: 92.4 kg (203 lb 9.6 oz) 91.5 kg (201 lb 12.8 oz) 91.7 kg (202 lb 3.2 oz)     Vital Signs with Ranges  Temp:  [97.3  F (36.3  C)-98.8  F (37.1  C)] 97.3  F (36.3  C)  Heart Rate:  [58-97] 60  Resp:  [16-18] 18  BP: (104-139)/(61-83) 138/78  SpO2:  [92 %-96 %] 94 %  I/O's Last 24 hours  I/O last 3 completed shifts:  In: 1683 [P.O.:1680; I.V.:3]  Out: 3675 [Urine:3675]    EXAM:    Constitutional:   in no apparent distress     Neck:   elevated JVP, 8-10     Lungs:   No rales     Cardiovascular:   normal S1 and S2 and S4 present     Extremities and Back:   Edema 2+     Neurological:   No gross or focal neurologic abnormalities                        Medications:        torsemide  20 mg Oral Daily     hydrALAZINE  50 mg Oral TID     sodium chloride (PF)  3 mL Intracatheter Q8H     aspirin  81 mg Oral Daily     carvedilol  50 mg Oral BID w/meals     latanoprost  1 drop Left Eye At Bedtime     traZODone  25 mg Oral At Bedtime     levETIRAcetam  500 mg Oral BID            Data:      All new lab and imaging data was reviewed.   Recent Labs   Lab Test  12/15/17   0620  12/13/17   0615  12/12/17   0530   WBC  6.7  5.7  5.1   HGB  10.3*  10.2*  9.7*   MCV  88  89  87   PLT  170  184  167      Recent Labs   Lab Test  12/17/17   0530  12/16/17   0618  12/15/17   0620   NA  138  133  135   POTASSIUM  3.6  3.9  3.6   CHLORIDE  101  99  100   CO2  28  29  25   BUN  23  28  37*   CR  1.09  1.32*  1.82*   ANIONGAP  9  5  10   ANGEL  8.4*  8.7  8.7   GLC  92  94  89     Recent Labs   Lab Test  12/12/17   0530  12/11/17   2325  12/11/17   1705   TROPI  <0.015  <0.015  <0.015          Imaging:   No results found for this or any previous visit (from the past 24 hour(s)).

## 2017-12-17 NOTE — PLAN OF CARE
Problem: Patient Care Overview  Goal: Plan of Care/Patient Progress Review  Discharge Planner PT    Patient plan for discharge: Home with wife's assist for lymph wraps  Current status: Independent with mobility. Wife demonstrates safety with wrap placement, aware of signs and symptoms of intolerance. B LEs remain with edema 2+ pitting B. Contours at the ankle and feet have improved B, still edema present. Tolerated wraps well, re-donned with therapist guiding pt's wife though the process. Both confident in application. Written instructions provided.   Barriers to return to prior living situation: None.  Recommendations for discharge: Home with continued use of lymph wraps, can discussed with MD at next visit a script for graded compression garments (can get at local [employer] store) vs trial of TEDs as per baseline. Did extensive education on the difference. Wife verbalized understanding.   Rationale for recommendations: Pt will benefit from continued use of edema wraps (not ACE wraps) to move fluid and improve mobility of the LEs with functional activity.       Entered by: Maria Sylvester 12/17/2017 1:13 PM   Physical Therapy Discharge Summary    Reason for therapy discharge:    Discharged to home.    Progress towards therapy goal(s). See goals on Care Plan in [facility] electronic health record for goal details.  Goals met    Therapy recommendation(s):    Pt to continue to don gradient compression bandages with Wife's assist to continue to manage volume in the LEs.

## 2017-12-17 NOTE — DISCHARGE INSTRUCTIONS
Please call your Primary Care Physician Dr. Seamus Carroll to make an appointment within 7 days of discharge.  It is very important to go to it--bring these papers and your insurance card with you.  At the visit, talk about your hospital stay.  Tell your doctor how you feel.  Show your new list of medications.  Your doctor will update records, make sure you are still doing OK, and decide if any tests or medication changes are needed. Please make sure you have your labs drawn at this appointment.

## 2017-12-17 NOTE — PLAN OF CARE
Problem: Patient Care Overview  Goal: Plan of Care/Patient Progress Review  Outcome: Adequate for Discharge Date Met: 12/17/17  Heart Failure Care Pathway  GOALS TO BE MET BEFORE DISCHARGE:    1. Decrease congestion and/or edema with diuretic therapy to achieve near      optimal volume status.            Dyspnea improved:  Yes            Edema improved:     Yes        Net I/O and Weights since admission:          12/12 1500 - 12/17 1459  In: 5450 [P.O.:5180; I.V.:270]  Out: 34975 [Urine:20956]  Net: -9475            Vitals:    12/11/17 1621 12/12/17 0545 12/13/17 0651 12/14/17 0652   Weight: 95.4 kg (210 lb 6.4 oz) 92.9 kg (204 lb 12.8 oz) 91.2 kg (201 lb 1.6 oz) 91.3 kg (201 lb 4.8 oz)    12/15/17 0608 12/16/17 0500 12/17/17 0348 12/17/17 0724   Weight: 91.6 kg (202 lb) 92.4 kg (203 lb 9.6 oz) 91.5 kg (201 lb 12.8 oz) 91.7 kg (202 lb 3.2 oz)       2.  O2 sats > 92% on RA or at prior home O2 therapy level.          Current oxygenation status:       SpO2: 95 %         O2 Device: None (Room air),            Able to wean O2 this shift to keep sats > 92%:  NA       Does patient use Home O2? No    3.  Tolerates ambulation and mobility near baseline: Yes        How many times did the patient ambulate with nursing staff this shift? 2  Pt discharged, HF packet given, education completed, emphasis made on wt monitoring and low sodium intake, medication given. Discharged with his belongings in company of his wife.    Please review the Heart Failure Care Pathway for additional HF goal outcomes.    Nereyda Mann RN  12/17/2017
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
## 2017-12-17 NOTE — PROGRESS NOTES
Chart check for Dr. Rob. Labs reviewed. No evidence for hemolysis or nutritional deficiency state. Stool for occult blood negative.  No monoclonal paraprotein either in the serum or urine.  H&H stable for now.  Agree with monitoring H&H and would offer additional work up with bone marrow studies if needed.  This can be done as an outpatient.  No new recommendations.  Please call with specific questions, if any.

## 2017-12-17 NOTE — PROGRESS NOTES
Notes, labs, vitals reviewed.  Pt feels ok and has no complaints.  He still has swelling in his legs.  No cp/sob.    AF-VSS  Cr down to 1.1.  RRR c 2/6 m  CTA B  2+ ble edema    1.  JESSE.  Resolving.  Remains TBV up, but seems to be responding to diuretics.  He is on tors.    I will sign off.  Thank you for this consultation.  Please call if any ?s.

## 2017-12-18 ENCOUNTER — CARE COORDINATION (OUTPATIENT)
Dept: CARDIOLOGY | Facility: CLINIC | Age: 67
End: 2017-12-18

## 2017-12-18 NOTE — PROGRESS NOTES
Patient was evaluated by cardiology while inpatient for acute diastolic HF. Called patient to discuss any post hospital d/c questions he may have, review medication changes, and confirm f/u appts.Patient denied any questions regarding new medications or changes to some of his current medications that he was taking prior to admission. Patient denied any SOB, chest pain, or light headedness. RN confirmed with patient that he has an apt scheduled on 12/21/17 with [facility] (11:30am lab and 12:30PM with [facility]). RN reminded patient to weigh himself daily, record weight, and bring record of weights to apt. RN also reminded patient to call clinic with a weight gain of >2lb overnight or 5lb in a week. Patient advised to call clinic with any cardiac related questions or concerns prior to his apt on 12/21/17. Patient verbalized understanding and agreed with plan.

## 2017-12-18 NOTE — PLAN OF CARE
Problem: Patient Care Overview  Goal: Plan of Care/Patient Progress Review  Occupational Therapy and Cardiac Rehab Discharge Summary    Reason for therapy discharge:    Discharged to home.  All goals and outcomes met, no further needs identified.    Progress towards therapy goal(s). See goals on Care Plan in [facility] electronic health record for goal details.  Goals met    Therapy recommendation(s):    Continued therapy is recommended.  Rationale/Recommendations:  OP CR.

## 2017-12-19 ENCOUNTER — DOCUMENTATION ONLY (OUTPATIENT)
Dept: CARDIOLOGY | Facility: CLINIC | Age: 67
End: 2017-12-19

## 2017-12-19 PROBLEM — D64.9 ANEMIA: Status: ACTIVE | Noted: 2017-12-19

## 2017-12-19 NOTE — PROGRESS NOTES
Reviewed that Dr. Munoz rec'd pt be considered for CardioMEMS implant/therapy.     CardioMEMS criteria as of today:    Per chart review, patient's criteria for CardioMEMS is as follows:  -Class III heart failure symptoms: yes  -Hospitalization for heart failure in the past 12 months: yes  -Followed by [facility] clinic providers: yes  -Compliant with TELEMANAGEMENT: has not been enrolled  -GFR>25: yes  -Chest circumference <65 inches: unknown, can measure at OV 12/21  -Able to take dual antiplatelets [Plavix for 30 days in addition to aspirin 81mg for life (or already on Coumadin)]: currently on asa 81mg daily, has anemia and was followed by heme as inpt  -Active infection: no  -History of >1 pulmonary embolism or DVT: no  -Congenital heart disease: no  -Mechanical right heart valves: no  -CRT in last 3 months: no  -RVSP noted on Echo done: no    Nina Larsen [facility] RN 3:59 PM 12/19/17  [phone]

## 2017-12-21 ENCOUNTER — OFFICE VISIT (OUTPATIENT)
Dept: CARDIOLOGY | Facility: CLINIC | Age: 67
End: 2017-12-21
Payer: COMMERCIAL

## 2017-12-21 VITALS
DIASTOLIC BLOOD PRESSURE: 85 MMHG | OXYGEN SATURATION: 96 % | HEART RATE: 65 BPM | HEIGHT: 67 IN | BODY MASS INDEX: 30.61 KG/M2 | SYSTOLIC BLOOD PRESSURE: 138 MMHG | WEIGHT: 195 LBS

## 2017-12-21 DIAGNOSIS — I50.30 (HFPEF) HEART FAILURE WITH PRESERVED EJECTION FRACTION (H): ICD-10-CM

## 2017-12-21 DIAGNOSIS — I10 HYPERTENSION GOAL BP (BLOOD PRESSURE) < 130/80: Primary | ICD-10-CM

## 2017-12-21 DIAGNOSIS — I50.33 ACUTE ON CHRONIC DIASTOLIC CONGESTIVE HEART FAILURE (H): ICD-10-CM

## 2017-12-21 DIAGNOSIS — I50.33 ACUTE ON CHRONIC DIASTOLIC HEART FAILURE (H): ICD-10-CM

## 2017-12-21 LAB
ANION GAP SERPL CALCULATED.3IONS-SCNC: 11.7 MMOL/L (ref 6–17)
BUN SERPL-MCNC: 17 MG/DL (ref 7–30)
CALCIUM SERPL-MCNC: 9.6 MG/DL (ref 8.5–10.5)
CHLORIDE SERPL-SCNC: 100 MMOL/L (ref 98–107)
CO2 SERPL-SCNC: 27 MMOL/L (ref 23–29)
CREAT SERPL-MCNC: 1.24 MG/DL (ref 0.7–1.3)
GFR SERPL CREATININE-BSD FRML MDRD: 59 ML/MIN/1.7M2
GLUCOSE SERPL-MCNC: 139 MG/DL (ref 70–105)
POTASSIUM SERPL-SCNC: 4.7 MMOL/L (ref 3.5–5.1)
SODIUM SERPL-SCNC: 134 MMOL/L (ref 136–145)

## 2017-12-21 PROCEDURE — 99214 OFFICE O/P EST MOD 30 MIN: CPT | Performed by: PHYSICIAN ASSISTANT

## 2017-12-21 PROCEDURE — 80048 BASIC METABOLIC PNL TOTAL CA: CPT | Performed by: INTERNAL MEDICINE

## 2017-12-21 PROCEDURE — 36415 COLL VENOUS BLD VENIPUNCTURE: CPT | Performed by: INTERNAL MEDICINE

## 2017-12-21 RX ORDER — POTASSIUM CHLORIDE 750 MG/1
10 TABLET, EXTENDED RELEASE ORAL DAILY
Qty: 30 TABLET | Refills: 3 | Status: SHIPPED | OUTPATIENT
Start: 2017-12-21 | End: 2018-05-04

## 2017-12-21 RX ORDER — TORSEMIDE 20 MG/1
20 TABLET ORAL DAILY
Qty: 30 TABLET | Refills: 3 | Status: SHIPPED | OUTPATIENT
Start: 2017-12-21 | End: 2018-01-16

## 2017-12-21 NOTE — NURSING NOTE
The After Visit Summary was reviewed with the patient following their office visit with DONALD Neumann. Patient was educated about any medication changes, the importance of following a low sodium diet, importance of recording daily weights, and when to call [facility] clinic. Patient verbalized understanding of the information and agrees to call with any questions or concerns.     Return appointment: Patient was given instructions on when and how to schedule their next office visit with the [facility] clinic.     Medication changes: KCL decreased to 10meq daily    Pt enrolled in telemanagement. Instructions on how to call with pt and wife. Will watch for first call in tomorrow.       Lindsay Wan RN  [facility] Care Coordinator  [phone]
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
## 2017-12-21 NOTE — MR AVS SNAPSHOT
After Visit Summary   12/21/2017    Aubrey Freeman    MRN: [id number]           Patient Information     Date Of Birth          1950        Visit Information        Provider Department      12/21/2017 12:30 PM Hope Che PA-C [facility]        Today's Diagnoses     Hypertension goal BP (blood pressure) < 130/80    -  1    Acute on chronic diastolic heart failure (H)        Acute on chronic diastolic congestive heart failure (H)        (HFpEF) heart failure with preserved ejection fraction (H)          Care Instructions    Call CORE nurse for any questions or concerns:  [phone]   *If you have concerns after hours, please call [phone], option 2 to speak with on call Cardiologist.    1. Medication changes from today:  Decrease potassium supplement to 10 meq daily. You can use up the rest of the 20 meq tabs you have at home.      2. Weigh yourself daily and write it down.     3. Call CORE nurse if your weight is up more than 2 pounds in one day or 5 pounds in one week.     4. Call CORE nurse if you feel more short of breath, have more abdominal bloating, or leg swelling.     5. Continue low sodium diet (less than 2000 mg daily). If you eat less salt, you will retain less fluid.     6. Alcohol can weaken your heart further. You should avoid alcohol or limit its use to special times, such as a holiday or birthday.      7. Do NOT take Aleve or ibuprofen without talking to your doctor first.      8. Let's see you back in a few weeks with repeat labs to make sure things are going well.      9. Lab Results: Labs today look good.   Component      Latest Ref Rng & Units 12/21/2017   Sodium      136 - 145 mmol/L 134 (L)   Potassium      3.5 - 5.1 mmol/L 4.7   Chloride      98 - 107 mmol/L 100   Carbon Dioxide      23 - 29 mmol/L 27   Anion Gap      6 - 17 mmol/L 11.7   Glucose      70 - 105 mg/dL 139 (H)   Urea Nitrogen      7 - 30 mg/dL 17    Creatinine      0.70 - 1.30 mg/dL 1.24   GFR Estimate      >60 mL/min/1.7m2 59 (L)   GFR Estimate If Black      >60 mL/min/1.7m2 71   Calcium      8.5 - 10.5 mg/dL 9.6     [facility] Clinic: Cardiomyopathy, Optimization, Rehabilitation, Education  The [facility] Clinic is a heart failure specialty clinic within the [facility] where you will work with specialized nurse practitioners, physician assistants, doctors, and registered nurses. They are dedicated to helping patients with heart failure to carefully adjust medications, receive education, and learn who and when to call if symptoms develop. They specialize in helping you better understand your condition, slow the progression of your disease, improve the length and quality of your life, help you detect future heart problems before they become life threatening, and avoid hospitalizations.          Follow-ups after your visit        Your next 10 appointments already scheduled     Jan 19, 2018  9:45 AM CST   Return Visit with Dave Miranda MD   [facility] ([facility])    [address] [place] [id number] [phone]              Future tests that were ordered for you today     Open Future Orders        Priority Expected Expires Ordered    Basic metabolic panel Routine 1/4/2018 12/21/2018 12/21/2017            Who to contact     If you have questions or need follow up information about today's clinic visit or your schedule please contact [facility] directly at [phone].  Normal or non-critical lab and imaging results will be communicated to you by MyChart, letter or phone within 4 business days after the clinic has received the results. If you do not hear from us within 7 days, please contact the clinic through MyChart or phone. If you have a critical or abnormal lab result, we will notify you by phone as soon as possible.  Submit refill requests  "through [employer] or call your pharmacy and they will forward the refill request to us. Please allow 3 business days for your refill to be completed.          Additional Information About Your Visit        [employer] Information     [employer] lets you send messages to your doctor, view your test results, renew your prescriptions, schedule appointments and more. To sign up, go to [email or web address] . Click on \"Log in\" on the left side of the screen, which will take you to the Welcome page. Then click on \"Sign up Now\" on the right side of the page.     You will be asked to enter the access code listed below, as well as some personal information. Please follow the directions to create your username and password.     Your access code is: [email or web address]  Expires: 2018  6:16 PM     Your access code will  in 90 days. If you need help or a new code, please call your [place] clinic or [phone].        Care EveryWhere ID     This is your Care EveryWhere ID. This could be used by other organizations to access your [place] medical records  [id number]        Your Vitals Were     Pulse Height Pulse Oximetry BMI (Body Mass Index)          65 1.702 m (5' 7\") 96% 30.54 kg/m2         Blood Pressure from Last 3 Encounters:   17 138/85   17 139/76   17 137/88    Weight from Last 3 Encounters:   17 88.5 kg (195 lb)   17 91.7 kg (202 lb 3.2 oz)   17 95.5 kg (210 lb 8 oz)              We Performed the Following     Follow-Up with [facility] Clinic - [facility] visit          Today's Medication Changes          These changes are accurate as of: 17  1:23 PM.  If you have any questions, ask your nurse or doctor.               These medicines have changed or have updated prescriptions.        Dose/Directions    potassium chloride SA 10 MEQ CR tablet   Commonly known as:  KLOR-CON   This may have changed:    - medication strength  - how much to take   Used for:  (HFpEF) heart failure with preserved " ejection fraction (H)   Changed by:  Hope Che PA-C        Dose:  10 mEq   Take 1 tablet (10 mEq) by mouth daily   Quantity:  30 tablet   Refills:  3            Where to get your medicines      These medications were sent to [facility] [address] [place], [place] - [address]  [address] [facility] [place] [address]     Phone:  [phone]     potassium chloride SA 10 MEQ CR tablet    torsemide 20 MG tablet                Primary Care Provider Office Phone # Fax #    Seamus Carroll [phone]       [facility] [address]  [facility] [address]        Equal Access to Services     CLOVIS MCGEE : Hadii aad ku hadasho Soomaali, waaxda luqadaha, qaybta kaalmada adeegyada, enrico milner. So [facility] [phone].    ATENCIÓN: Si habla español, tiene a rodriguez disposición servicios gratuitos de asistencia lingüística. Llame al [phone].    We comply with applicable federal civil rights laws and [place] laws. We do not discriminate on the basis of race, color, national origin, age, disability, sex, sexual orientation, or gender identity.            Thank you!     Thank you for choosing [facility]  for your care. Our goal is always to provide you with excellent care. Hearing back from our patients is one way we can continue to improve our services. Please take a few minutes to complete the written survey that you may receive in the mail after your visit with us. Thank you!             Your Updated Medication List - Protect others around you: Learn how to safely use, store and throw away your medicines at www.disposemymeds.org.          This list is accurate as of: 12/21/17  1:23 PM.  Always use your most recent med list.                   Brand Name Dispense Instructions for use Diagnosis    aspirin 81 MG EC tablet     100 tablet    Take 1 tablet (81 mg) by mouth daily    Acute systolic  congestive heart failure (H)       carvedilol 25 MG tablet    COREG    120 tablet    Take 2 tablets (50 mg) by mouth 2 times daily (with meals)    Hypertensive crisis       COENZYME Q-10 PO      Take 100 mg by mouth daily        hydrALAZINE 50 MG tablet    APRESOLINE    90 tablet    Take 1 tablet (50 mg) by mouth 3 times daily    Hypertension goal BP (blood pressure) < 130/80       latanoprost 0.005 % ophthalmic solution    XALATAN     Place 1 drop Into the left eye At Bedtime        LEVETIRACETAM PO      Keppra 500 mg twice daily        LYSINE PO      Take 1,500 mg by mouth daily        MAGNESIUM PO      Take 400 mg by mouth daily        Multi-vitamin Tabs tablet      Take 1 tablet by mouth daily [facility] Support formulation        potassium chloride SA 10 MEQ CR tablet    KLOR-CON    30 tablet    Take 1 tablet (10 mEq) by mouth daily    (HFpEF) heart failure with preserved ejection fraction (H)       PROBIOTIC PO      Take 1 capsule by mouth daily Brand = Vegyzyme        torsemide 20 MG tablet    DEMADEX    30 tablet    Take 1 tablet (20 mg) by mouth daily    Acute on chronic diastolic congestive heart failure (H)       traZODone 50 MG tablet    DESYREL     Take 25 mg by mouth At Bedtime        vitamin C 500 MG Chew      Take by mouth daily        ZINC 15 PO      Take 15 mg by mouth daily

## 2017-12-21 NOTE — PATIENT INSTRUCTIONS
Call CORE nurse for any questions or concerns:  [phone]   *If you have concerns after hours, please call [phone], option 2 to speak with on call Cardiologist.    1. Medication changes from today:  Decrease potassium supplement to 10 meq daily. You can use up the rest of the 20 meq tabs you have at home.      2. Weigh yourself daily and write it down.     3. Call CORE nurse if your weight is up more than 2 pounds in one day or 5 pounds in one week.     4. Call CORE nurse if you feel more short of breath, have more abdominal bloating, or leg swelling.     5. Continue low sodium diet (less than 2000 mg daily). If you eat less salt, you will retain less fluid.     6. Alcohol can weaken your heart further. You should avoid alcohol or limit its use to special times, such as a holiday or birthday.      7. Do NOT take Aleve or ibuprofen without talking to your doctor first.      8. Let's see you back in a few weeks with repeat labs to make sure things are going well.      9. Lab Results: Labs today look good.   Component      Latest Ref Rng & Units 12/21/2017   Sodium      136 - 145 mmol/L 134 (L)   Potassium      3.5 - 5.1 mmol/L 4.7   Chloride      98 - 107 mmol/L 100   Carbon Dioxide      23 - 29 mmol/L 27   Anion Gap      6 - 17 mmol/L 11.7   Glucose      70 - 105 mg/dL 139 (H)   Urea Nitrogen      7 - 30 mg/dL 17   Creatinine      0.70 - 1.30 mg/dL 1.24   GFR Estimate      >60 mL/min/1.7m2 59 (L)   GFR Estimate If Black      >60 mL/min/1.7m2 71   Calcium      8.5 - 10.5 mg/dL 9.6     [facility]: Cardiomyopathy, Optimization, Rehabilitation, Education  The [facility] is a heart failure specialty clinic within the [facility] where you will work with specialized nurse practitioners, physician assistants, doctors, and registered nurses. They are dedicated to helping patients with heart failure to carefully adjust medications, receive education, and learn who and when to call if symptoms develop. They  specialize in helping you better understand your condition, slow the progression of your disease, improve the length and quality of your life, help you detect future heart problems before they become life threatening, and avoid hospitalizations.

## 2017-12-21 NOTE — NURSING NOTE
Enrolled pt's info into HF Telemanagement system. Reminder sent to CORE RN board to watch for first call in tomorrow. Per Laura Che PAC, wt parameters will be 189#-193#.     Nina Larsen [facility] RN 1:33 PM 12/21/17  [phone]

## 2017-12-22 ENCOUNTER — CARE COORDINATION (OUTPATIENT)
Dept: CARDIOLOGY | Facility: CLINIC | Age: 67
End: 2017-12-22

## 2017-12-22 NOTE — PROGRESS NOTES
HF Telemanagement    First weigh in, 190 # and within parameters (187-193). No symptoms reported. Will continue to monitor. Lynn Kirkland RN 1:32 PM 12/22/17  Current diuretic: Torsemide 20 mg daily, KCL 10 mEq daily.

## 2017-12-27 ENCOUNTER — CARE COORDINATION (OUTPATIENT)
Dept: CARDIOLOGY | Facility: CLINIC | Age: 67
End: 2017-12-27

## 2017-12-27 DIAGNOSIS — I50.33 ACUTE ON CHRONIC DIASTOLIC CONGESTIVE HEART FAILURE (H): ICD-10-CM

## 2017-12-27 NOTE — PROGRESS NOTES
Telemanagment     Alert for: wt below parameters   Current diuretic: Torsemide 20mg daily and KCL 10meq daily  Heart Failure sx: none reported  Plan: Wt down 5# since 12/22 on home scale. Pt last seen in clinic on 12/21 by DONALD Bautista. At that time pt felt to be at dry wt. Clinic wt on 12/21 was 195#, home wt was 191#. Wt parameters currently 187-193#. Next OV 1/19/18 with Dr. Miranda. Pt is also scheduled for a BMP on 1/4/18 per Lily.     Called pt, no answer. LVM for call back to review. JACQUI Mckeon 12:27 PM 12/27/17

## 2017-12-28 NOTE — PROGRESS NOTES
Have not heard back from pt from yesterday. Wt today 185#, same as yesterday, still 2# below wt parameters. See note from yesterday for more detail. Next OV 1/4/18 for a BMP, and then 1/19/18 with Dr. Miranda. Called wife today, as never heard back from pt yesterday, no answer. LVM for call back. Tried home phone, pt answered. Pt states he is feeling really well. He is wearing compression stockings and states they are working really well. He denies any SOB, swelling, or bloating. Pt states his energy is really good. Told pt I would review with ALiegl and let them know if changes. He stated understanding.     Messaged to ALiegl for review. JACQUI Mckeon 9:49 AM 12/28/17

## 2017-12-29 ENCOUNTER — TRANSFERRED RECORDS (OUTPATIENT)
Dept: HEALTH INFORMATION MANAGEMENT | Facility: CLINIC | Age: 67
End: 2017-12-29

## 2017-12-29 LAB
ANION GAP SERPL CALCULATED.3IONS-SCNC: 12 MMOL/L
BUN SERPL-MCNC: 20 MG/DL
CALCIUM SERPL-MCNC: 9.6 MG/DL
CHLORIDE SERPLBLD-SCNC: 101 MMOL/L
CO2 SERPL-SCNC: 25 MMOL/L
CREAT SERPL-MCNC: 1.08 MG/DL
GFR SERPL CREATININE-BSD FRML MDRD: >60 ML/MIN/1.73M2
GLUCOSE SERPL-MCNC: 187 MG/DL (ref 70–99)
POTASSIUM SERPL-SCNC: 4.2 MMOL/L
SODIUM SERPL-SCNC: 138 MMOL/L

## 2017-12-29 NOTE — PROGRESS NOTES
Called pt, reviewed stable lab results. Reviewed recommendations per DONALD Bautista to continue current medications. Reviewed recommendation for repeat BMP prior to visit with Dr. Miranda on 1/19. Scheduled pt for lab prior to visit. Wife wrote down information. Wt parameters adjusted. Will continue to monitor. JACQUI Mckeon 3:04 PM 12/29/17

## 2017-12-29 NOTE — PROGRESS NOTES
Able to review BMP results drawn today in Care Everywhere. BMP stable. Messaged to DONALD Neumann for review. JACQUI Mckeon 2:36 PM 12/29/17

## 2017-12-29 NOTE — PROGRESS NOTES
Kidney function is stable. Continue current dose of Torsemide and lets adjust his weight parameters to 189 - 183 lbs. If he continues to feel well and weight stays stable, he can just follow up with Ruben as scheduled with a BMP prior. Thanks! KENNETH Che PA-C 2:48 PM 12/29/2017

## 2017-12-29 NOTE — PROGRESS NOTES
I'm glad he feels great. This may be a better weight for him. Let's see what his weight is today, and if stable we can adjust his parameters to 191 - 185 lbs. I'd continue the same dose of Torsemide. If it drops any further I'd move up his BMP scheduled for next week to make sure his kidneys function is stable. Thanks! KENNETH Che PA-C 8:03 AM 12/29/2017

## 2018-01-02 NOTE — PROGRESS NOTES
Telemanagment     Alert for: wt below parameters  Current diuretic: Torsemide 20mg daily and KCL 10meq daily  Heart Failure sx: None reported  Plan: BMP done last week on 12/29 per ALieg was stable. Wt parameters were adjusted to 183-189#. Wt the last 4 days has been between 182-183#. Next OV 1/19/18 with Dr. Miranda with BMP prior. No symptoms reported. Messaged to [facility] to review wt parameters. JACQUI Mckeon 9:03 AM 01/02/18

## 2018-01-03 NOTE — PROGRESS NOTES
Reviewed with DONALD Bautista: Decrease wt parameters to 182#. If wt drops to 181# or less, decrease Torsemide to 20mg every other day, alternating with 10mg every other day until pt is seen in clinic on 1/19 with Dr. Miranda.     Wt parameters adjusted. PRN plan placed on snapshot. Will continue to monitor. JACQUI Mckeon 12:55 PM 01/03/18

## 2018-01-09 DIAGNOSIS — I50.33 ACUTE ON CHRONIC DIASTOLIC CONGESTIVE HEART FAILURE (H): Primary | ICD-10-CM

## 2018-01-09 NOTE — PROGRESS NOTES
HF Telemanagment    Contacted patient, also spoke with wife too. Wt today 179 #, down 2 # from yesterday and well below parameters (181-188). No symptoms reported. No diet or fluid intake changes. Per ALiegl : If wt drops to 181 # or less, decrease Torsemide to 20 mg every other day, alternating with 10 mg every other day until pt is seen in clinic on 1/19/18 with . Wife wrote down instructions and read them back to me. Pt continues taking KCL 10 mEq daily. Confirmed lab and appointment on 1/19/18 with . Will update Ovidio and call patient back if she has any further recommendations. Lynn Kirkland RN 11:01 AM 01/09/18

## 2018-01-10 NOTE — PROGRESS NOTES
HF Telemanagment    Wt 178#, down one pound from yesterday. No symptoms reported. Spoke with patient and wife yesterday about diuretic plan. If wt drops to 181# or less, decrease Torsemide to 20 mg every other day, alternating with 10 mg every other day until patient is seen in clinic on 1/19/18 with Dr. Miranda. Pt continues taking KCL 10 mEq daily.  Will continue to monitor. Lynn Kirkland RN 1:32 PM 01/10/18

## 2018-01-11 NOTE — PROGRESS NOTES
HF Telemanagment    Wt 179#, up one pound from yesterday and still remains below parameters (181-188). No symptoms reported. Pt continues with diuretic plan listed below.  Per DONALD Bautista: If wt drops to 181 or less, decrease Torsemide to 20 mg every other day, alternating with 10 mg every other day until patient is seen in clnic on 1/19/18 with .  Will continue to monitor.  Lynn Kirkland RN 11:07 AM 01/11/18

## 2018-01-12 NOTE — PROGRESS NOTES
HF Telemanagement    Current diuretic plan: If wt drops to 181 or less, decrease Torsemide to 20 mg every other day, alternating with 10 mg every other day until patient is seen in clinic on 1/19/18 with .Wt 178# today, down one pound from yesterday and remains below parameters. No symptoms reported. Will continue to monitor. Lynn Kirkland RN 8:53 AM 01/12/18

## 2018-01-16 RX ORDER — TORSEMIDE 20 MG/1
TABLET ORAL
Qty: 30 TABLET | Refills: 3 | COMMUNITY
Start: 2018-01-16 | End: 2018-06-21

## 2018-01-16 NOTE — PROGRESS NOTES
Noted. Reminder sent to CORE RN board to give wt graph to Dr. Miranda and review if adjustments are needed to telemanagement plan.     Nina Larsen [facility] RN 1:04 PM 01/16/18  [phone]

## 2018-01-16 NOTE — PROGRESS NOTES
I'd continue things as they are. If his BMP looks good on Friday we could adjust his parameters to down to 176  - 186 lbs if he is doing well. KENNETH Che PA-C 12:32 PM 1/16/2018

## 2018-01-16 NOTE — PROGRESS NOTES
HF Telemanagement:    Alert for wt 3# below paramaters. No sx reported.     Pt currently taking torsemide 20mg QOD alternating 10mg QOD and KCL 10meq daily until he sees Dr. Miranda 1/19. (Wife confirmed dosing.)    Called and spoke w/ pt-he states he feels well and denies dizziness. Spoke w/ wife who says pt looks great, has more energy. Told her to cont current plan and call w/ questions/concerns between now and 1/19. Will ask Laura Che PAC if she wants to adjust parameters or defer to Dr. Miranda.             Nina Larsen [facility] RN 11:25 AM 01/16/18  [phone]

## 2018-01-19 ENCOUNTER — OFFICE VISIT (OUTPATIENT)
Dept: CARDIOLOGY | Facility: CLINIC | Age: 68
End: 2018-01-19
Attending: NURSE PRACTITIONER
Payer: COMMERCIAL

## 2018-01-19 VITALS
SYSTOLIC BLOOD PRESSURE: 124 MMHG | HEART RATE: 78 BPM | WEIGHT: 182 LBS | DIASTOLIC BLOOD PRESSURE: 80 MMHG | BODY MASS INDEX: 28.56 KG/M2 | HEIGHT: 67 IN

## 2018-01-19 DIAGNOSIS — I50.33 ACUTE ON CHRONIC DIASTOLIC HEART FAILURE (H): Primary | ICD-10-CM

## 2018-01-19 DIAGNOSIS — I50.21 ACUTE SYSTOLIC CONGESTIVE HEART FAILURE (H): ICD-10-CM

## 2018-01-19 DIAGNOSIS — I16.9 HYPERTENSIVE CRISIS: ICD-10-CM

## 2018-01-19 DIAGNOSIS — I73.9 PAD (PERIPHERAL ARTERY DISEASE) (H): ICD-10-CM

## 2018-01-19 LAB
ANION GAP SERPL CALCULATED.3IONS-SCNC: 10.3 MMOL/L (ref 6–17)
BUN SERPL-MCNC: 22 MG/DL (ref 7–30)
CALCIUM SERPL-MCNC: 9.4 MG/DL (ref 8.5–10.5)
CHLORIDE SERPL-SCNC: 102 MMOL/L (ref 98–107)
CO2 SERPL-SCNC: 28 MMOL/L (ref 23–29)
CREAT SERPL-MCNC: 1.18 MG/DL (ref 0.7–1.3)
GFR SERPL CREATININE-BSD FRML MDRD: 62 ML/MIN/1.7M2
GLUCOSE SERPL-MCNC: 178 MG/DL (ref 70–105)
POTASSIUM SERPL-SCNC: 4.3 MMOL/L (ref 3.5–5.1)
SODIUM SERPL-SCNC: 136 MMOL/L (ref 136–145)

## 2018-01-19 PROCEDURE — 99214 OFFICE O/P EST MOD 30 MIN: CPT | Performed by: INTERNAL MEDICINE

## 2018-01-19 PROCEDURE — 80048 BASIC METABOLIC PNL TOTAL CA: CPT | Performed by: INTERNAL MEDICINE

## 2018-01-19 PROCEDURE — 36415 COLL VENOUS BLD VENIPUNCTURE: CPT | Performed by: INTERNAL MEDICINE
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
## 2018-01-19 NOTE — MR AVS SNAPSHOT
After Visit Summary   1/19/2018    Aubrey Freeman    MRN: [id number]           Patient Information     Date Of Birth          1950        Visit Information        Provider Department      1/19/2018 9:45 AM Dave Miranda MD [facility]        Today's Diagnoses     Acute on chronic diastolic heart failure (H)    -  1    Acute systolic congestive heart failure (H)        Hypertensive crisis        PAD (peripheral artery disease) (H)           Follow-ups after your visit        Additional Services     Follow-Up with Cardiac Advanced Practice Provider           Follow-Up with Cardiologist                 Future tests that were ordered for you today     Open Future Orders        Priority Expected Expires Ordered    Follow-Up with Cardiologist Routine 1/19/2019 6/3/2019 1/19/2018    Follow-Up with Cardiac Advanced Practice Provider Routine 4/19/2018 1/19/2019 1/19/2018            Who to contact     If you have questions or need follow up information about today's clinic visit or your schedule please contact [facility] directly at [phone].  Normal or non-critical lab and imaging results will be communicated to you by Robotronicahart, letter or phone within 4 business days after the clinic has received the results. If you do not hear from us within 7 days, please contact the clinic through Robotronicahart or phone. If you have a critical or abnormal lab result, we will notify you by phone as soon as possible.  Submit refill requests through Kunlun or call your pharmacy and they will forward the refill request to us. Please allow 3 business days for your refill to be completed.          Additional Information About Your Visit        Robotronicahart Information     Kunlun lets you send messages to your doctor, view your test results, renew your prescriptions, schedule appointments and more. To sign up, go to [email or web address] . Click  "on \"Log in\" on the left side of the screen, which will take you to the Welcome page. Then click on \"Sign up Now\" on the right side of the page.     You will be asked to enter the access code listed below, as well as some personal information. Please follow the directions to create your username and password.     Your access code is: [email or web address]  Expires: 2018  6:16 PM     Your access code will  in 90 days. If you need help or a new code, please call your [facility] clinic or [phone].        Care EveryWhere ID     This is your Care EveryWhere ID. This could be used by other organizations to access your [facility] medical records  [id number]        Your Vitals Were     Pulse Height BMI (Body Mass Index)             78 1.702 m (5' 7\") 28.51 kg/m2          Blood Pressure from Last 3 Encounters:   18 124/80   17 138/85   17 139/76    Weight from Last 3 Encounters:   18 82.6 kg (182 lb)   17 88.5 kg (195 lb)   17 91.7 kg (202 lb 3.2 oz)              We Performed the Following     Follow-Up with Cardiologist        Primary Care Provider Office Phone # Fax #    Seamus Carroll [phone]       [facility] [address] [facility] [address]        Equal Access to Services     CLOVIS MCGEE AH: Hadii aad ku hadasho Soomaali, waaxda luqadaha, qaybta kaalmada adeegyada, enrico milner. So [facility] [phone].    ATENCIÓN: Si habla español, tiene a rodriguez disposición servicios gratuitos de asistencia lingüística. Amado al [phone].    We comply with applicable federal civil rights laws and [place] laws. We do not discriminate on the basis of race, color, national origin, age, disability, sex, sexual orientation, or gender identity.            Thank you!     Thank you for choosing [facility]  for your care. Our goal is always to provide you with excellent care. Hearing back from our " patients is one way we can continue to improve our services. Please take a few minutes to complete the written survey that you may receive in the mail after your visit with us. Thank you!             Your Updated Medication List - Protect others around you: Learn how to safely use, store and throw away your medicines at www.disposemymeds.org.          This list is accurate as of: 1/19/18  3:03 PM.  Always use your most recent med list.                   Brand Name Dispense Instructions for use Diagnosis    aspirin 81 MG EC tablet     100 tablet    Take 1 tablet (81 mg) by mouth daily    Acute systolic congestive heart failure (H)       carvedilol 25 MG tablet    COREG    120 tablet    Take 2 tablets (50 mg) by mouth 2 times daily (with meals)    Hypertensive crisis       COENZYME Q-10 PO      Take 100 mg by mouth daily        hydrALAZINE 50 MG tablet    APRESOLINE    90 tablet    Take 1 tablet (50 mg) by mouth 3 times daily    Hypertension goal BP (blood pressure) < 130/80       latanoprost 0.005 % ophthalmic solution    XALATAN     Place 1 drop Into the left eye At Bedtime        LEVETIRACETAM PO      Keppra 500 mg twice daily        LYSINE PO      Take 1,500 mg by mouth daily        MAGNESIUM PO      Take 400 mg by mouth daily        Multi-vitamin Tabs tablet      Take 1 tablet by mouth daily [facility] Support formulation        potassium chloride SA 10 MEQ CR tablet    KLOR-CON    30 tablet    Take 1 tablet (10 mEq) by mouth daily    (HFpEF) heart failure with preserved ejection fraction (H)       PROBIOTIC PO      Take 1 capsule by mouth daily Brand = Vegyzyme        torsemide 20 MG tablet    DEMADEX    30 tablet    Take 20mg (1 tab) every other day, alternating with 10mg (0.5 tab) every other day    Acute on chronic diastolic congestive heart failure (H)       traZODone 50 MG tablet    DESYREL     Take 25 mg by mouth At Bedtime        vitamin C 500 MG Chew      Take by mouth daily        ZINC 15 PO      Take 15 mg by  mouth daily
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
## 2018-01-25 NOTE — PROGRESS NOTES
Telemanagment     Alert for: Wt below parameters  Current diuretic: Torsemide 20 mg QOD, alternating 10 mg QOD and KCL 10 mEq daily  Heart Failure sx: None  Plan: Last seen by  on 1/19/18. Wt down 2 # from yesterday and below parameters (181-188). No symptoms reported. Will check wt tomorrow to see if wt goes back within parameters. Will continue to monitor. Lynn Kirkland RN 9:05 AM 01/25/18

## 2018-01-26 NOTE — PROGRESS NOTES
Telemanagment     Alert for:Wt below parameters  Current diuretic: Torsemide 20 mg qod, alternating 10 mg qod and KCL 10 mEq daily  Heart Failure sx: None reported  Diet: Not assessed  Plan: Per open order review, no current f/u due at this time. Last seen by  on 1/19/18  Wt 179# , same as yesterday but still below parameters. No symptoms reported. Wt pattern indicates that wt has been below parameters (189-193) more often than not. Will update ALiegl to see if she wants to adjust parameters. Lynn Kirkland RN 10:54 AM 01/26/18

## 2018-01-26 NOTE — PROGRESS NOTES
His parameters now are 181 - 188 right? Lets adjust to 177 - 187 lbs. Thanks! KENNETH Che PA-C 11:11 AM 1/26/2018

## 2018-01-26 NOTE — PROGRESS NOTES
Parameters adjusted to 177-187# per Laura Che PAC.    Nina Larsen [facility] RN 4:48 PM 01/26/18  [phone]

## 2018-02-28 LAB
CATECHOLS UR-IMP: NORMAL
COLLECT DURATION TIME UR: 24 HR
CREAT 24H UR-MRATE: 805 MG/D (ref 800–2100)
CREAT UR-MCNC: 87 MG/DL
DOPAMINE 24H UR-MRATE: 92 UG/D (ref 77–324)
DOPAMINE UR-MCNC: 2 UG/L
DOPAMINE/CREAT UR: 114 UG/G CRT (ref 0–250)
EPINEPH 24H UR-MRATE: 2 UG/D (ref 1–7)
EPINEPH UR-MCNC: 99 UG/L
EPINEPH/CREAT UR: 2 UG/G CRT (ref 0–20)
NOREPINEPH 24H UR-MRATE: 22 UG/D (ref 16–71)
NOREPINEPH UR-MCNC: 24 UG/L
NOREPINEPH/CREAT UR: 28 UG/G CRT (ref 0–45)
SPECIMEN VOL ?TM UR: 925 ML

## 2018-04-06 ENCOUNTER — CARE COORDINATION (OUTPATIENT)
Dept: CARDIOLOGY | Facility: CLINIC | Age: 68
End: 2018-04-06

## 2018-04-06 NOTE — PROGRESS NOTES
Telemanagment     Alert for: Weight down 3# from yesterday  Current diuretic: Torsemide 20 mg qod, alternating 10 mg qod and KCL 10 mEq daily  Heart Failure sx: none reported  Next follow up: none currently scheduled      Pt's weight down 3# from yesterday and within parameters. No symptoms reported. Will continue to monitor. Becca OSMAN April 6, 2018, 2:05 PM

## 2018-04-19 DIAGNOSIS — I16.9 HYPERTENSIVE CRISIS: ICD-10-CM

## 2018-04-19 RX ORDER — CARVEDILOL 25 MG/1
50 TABLET ORAL 2 TIMES DAILY WITH MEALS
Qty: 120 TABLET | Refills: 3 | Status: SHIPPED | OUTPATIENT
Start: 2018-04-19 | End: 2018-08-07

## 2018-05-04 ENCOUNTER — CARE COORDINATION (OUTPATIENT)
Dept: CARDIOLOGY | Facility: CLINIC | Age: 68
End: 2018-05-04

## 2018-05-04 DIAGNOSIS — I50.30 (HFPEF) HEART FAILURE WITH PRESERVED EJECTION FRACTION (H): ICD-10-CM

## 2018-05-04 RX ORDER — POTASSIUM CHLORIDE 750 MG/1
10 TABLET, EXTENDED RELEASE ORAL DAILY
Qty: 90 TABLET | Refills: 1 | Status: SHIPPED | OUTPATIENT
Start: 2018-05-04 | End: 2018-05-04

## 2018-05-04 RX ORDER — POTASSIUM CHLORIDE 750 MG/1
10 TABLET, EXTENDED RELEASE ORAL DAILY
Qty: 90 TABLET | Refills: 0 | Status: SHIPPED | OUTPATIENT
Start: 2018-05-04 | End: 2018-10-30

## 2018-05-11 ENCOUNTER — OFFICE VISIT (OUTPATIENT)
Dept: CARDIOLOGY | Facility: CLINIC | Age: 68
End: 2018-05-11
Attending: INTERNAL MEDICINE
Payer: COMMERCIAL

## 2018-05-11 ENCOUNTER — CARE COORDINATION (OUTPATIENT)
Dept: CARDIOLOGY | Facility: CLINIC | Age: 68
End: 2018-05-11

## 2018-05-11 VITALS
OXYGEN SATURATION: 98 % | DIASTOLIC BLOOD PRESSURE: 80 MMHG | SYSTOLIC BLOOD PRESSURE: 125 MMHG | HEART RATE: 56 BPM | BODY MASS INDEX: 29.96 KG/M2 | WEIGHT: 190.9 LBS | HEIGHT: 67 IN

## 2018-05-11 DIAGNOSIS — I50.33 ACUTE ON CHRONIC DIASTOLIC HEART FAILURE (H): ICD-10-CM

## 2018-05-11 DIAGNOSIS — I10 HYPERTENSION GOAL BP (BLOOD PRESSURE) < 130/80: Primary | ICD-10-CM

## 2018-05-11 PROCEDURE — 99214 OFFICE O/P EST MOD 30 MIN: CPT | Performed by: PHYSICIAN ASSISTANT

## 2018-05-11 RX ORDER — HYDRALAZINE HYDROCHLORIDE 50 MG/1
50 TABLET, FILM COATED ORAL 3 TIMES DAILY
COMMUNITY
End: 2018-05-11

## 2018-05-11 RX ORDER — HYDRALAZINE HYDROCHLORIDE 50 MG/1
50 TABLET, FILM COATED ORAL 3 TIMES DAILY
Qty: 60 TABLET | Refills: 11 | Status: SHIPPED | OUTPATIENT
Start: 2018-05-11 | End: 2019-01-29
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
## 2018-05-11 NOTE — PATIENT INSTRUCTIONS
Call CORE nurse for any questions or concerns:  [phone]   *If you have concerns after hours, please call [phone], option 2 to speak with on call Cardiologist.    1. Medication changes from today:  No medication changes today.      2. Weigh yourself daily and write it down. Call the CORE nurses for a weight of 188 lbs or greater.     3. Call CORE nurse if your weight is up more than 2 pounds in one day or 5 pounds in one week.     4. Call CORE nurse if you feel more short of breath, have more abdominal bloating, or leg swelling.     5. Continue low sodium diet (less than 2000 mg daily). If you eat less salt, you will retain less fluid.     6. Alcohol can weaken your heart further. You should avoid alcohol or limit its use to special times, such as a holiday or birthday.      7. Do NOT take Aleve or ibuprofen without talking to your doctor first.    8.  Come back to see Dr. Miranda in January. Please call us sooner with any concerns.      CORE Clinic: Cardiomyopathy, Optimization, Rehabilitation, Education  The CORE Clinic is a heart failure specialty clinic within the [facility] where you will work with specialized nurse practitioners, physician assistants, doctors, and registered nurses. They are dedicated to helping patients with heart failure to carefully adjust medications, receive education, and learn who and when to call if symptoms develop. They specialize in helping you better understand your condition, slow the progression of your disease, improve the length and quality of your life, help you detect future heart problems before they become life threatening, and avoid hospitalizations.

## 2018-05-11 NOTE — PROGRESS NOTES
Pt was seen in clinic today by DONALD Neumann. Wt and symptoms have been stable, dis-enrolled from [employer]. Lynn Kirkland RN 9:34 AM 05/11/18

## 2018-05-11 NOTE — MR AVS SNAPSHOT
After Visit Summary   5/11/2018    Aubrey Freeman    MRN: [id number]           Patient Information     Date Of Birth          1950        Visit Information        Provider Department      5/11/2018 8:50 AM Hope Che PA-C [facility]   [place]        Today's Diagnoses     Hypertension goal BP (blood pressure) < 130/80    -  1    Acute on chronic diastolic heart failure (H)          Care Instructions    Call CORE nurse for any questions or concerns:  [phone]   *If you have concerns after hours, please call [phone], option 2 to speak with on call Cardiologist.    1. Medication changes from today:  No medication changes today.      2. Weigh yourself daily and write it down. Call the CORE nurses for a weight of 188 lbs or greater.     3. Call CORE nurse if your weight is up more than 2 pounds in one day or 5 pounds in one week.     4. Call CORE nurse if you feel more short of breath, have more abdominal bloating, or leg swelling.     5. Continue low sodium diet (less than 2000 mg daily). If you eat less salt, you will retain less fluid.     6. Alcohol can weaken your heart further. You should avoid alcohol or limit its use to special times, such as a holiday or birthday.      7. Do NOT take Aleve or ibuprofen without talking to your doctor first.    8.  Come back to see Dr. Miranda in January. Please call us sooner with any concerns.      [facility]: Cardiomyopathy, Optimization, Rehabilitation, Education  The CORE Clinic is a heart failure specialty clinic within the [facility] where you will work with specialized nurse practitioners, physician assistants, doctors, and registered nurses. They are dedicated to helping patients with heart failure to carefully adjust medications, receive education, and learn who and when to call if symptoms develop. They specialize in helping you better understand your condition, slow the progression of  "your disease, improve the length and quality of your life, help you detect future heart problems before they become life threatening, and avoid hospitalizations.          Follow-ups after your visit        Who to contact     If you have questions or need follow up information about today's clinic visit or your schedule please contact [facility]   PATRICIA directly at [phone].  Normal or non-critical lab and imaging results will be communicated to you by MyChart, letter or phone within 4 business days after the clinic has received the results. If you do not hear from us within 7 days, please contact the clinic through [employer] or phone. If you have a critical or abnormal lab result, we will notify you by phone as soon as possible.  Submit refill requests through [employer] or call your pharmacy and they will forward the refill request to us. Please allow 3 business days for your refill to be completed.          Additional Information About Your Visit        [employer] Information     [employer] lets you send messages to your doctor, view your test results, renew your prescriptions, schedule appointments and more. To sign up, go to [email or web address] . Click on \"Log in\" on the left side of the screen, which will take you to the Welcome page. Then click on \"Sign up Now\" on the right side of the page.     You will be asked to enter the access code listed below, as well as some personal information. Please follow the directions to create your username and password.     Your access code is: [email or web address]  Expires: 2018  9:21 AM     Your access code will  in 90 days. If you need help or a new code, please call your [facility] clinic or [phone].        Care EveryWhere ID     This is your Care EveryWhere ID. This could be used by other organizations to access your [facility] medical records  [id number]        Your Vitals Were     Pulse Height Pulse Oximetry BMI (Body Mass Index)       " "   56 1.702 m (5' 7\") 98% 29.9 kg/m2         Blood Pressure from Last 3 Encounters:   05/11/18 125/80   01/19/18 124/80   12/21/17 138/85    Weight from Last 3 Encounters:   05/11/18 86.6 kg (190 lb 14.4 oz)   01/19/18 82.6 kg (182 lb)   12/21/17 88.5 kg (195 lb)              We Performed the Following     Follow-Up with Cardiac Advanced Practice Provider          Where to get your medicines      These medications were sent to [facility] [address] [place] [address] [address] [facility]  [address] [facility] [address]     Phone:  [phone]     hydrALAZINE 50 MG tablet          Primary Care Provider Office Phone # Fax #    Seamus Carroll [phone]       [facility] [address]  [facility] [address]        Equal Access to Services     CLOVIS MCGEE AH: Hadii aad ku hadasho Soomaali, waaxda luqadaha, qaybta kaalmada adeegyada, waxay idiin haylouisn bella garcia . So [facility] [place] [facility] [phone].    ATENCIÓN: Si habla español, tiene a rodriguez disposición servicios gratuitos de asistencia lingüística. Llame al [phone].    We comply with applicable federal civil rights laws and [place] laws. We do not discriminate on the basis of race, color, national origin, age, disability, sex, sexual orientation, or gender identity.            Thank you!     Thank you for choosing [facility]  for your care. Our goal is always to provide you with excellent care. Hearing back from our patients is one way we can continue to improve our services. Please take a few minutes to complete the written survey that you may receive in the mail after your visit with us. Thank you!             Your Updated Medication List - Protect others around you: Learn how to safely use, store and throw away your medicines at www.disposemymeds.org.          This list is accurate as of 5/11/18  9:21 AM.  Always use your most recent med list.                   Brand Name " Dispense Instructions for use Diagnosis    aspirin 81 MG EC tablet     100 tablet    Take 1 tablet (81 mg) by mouth daily    Acute systolic congestive heart failure (H)       carvedilol 25 MG tablet    COREG    120 tablet    Take 2 tablets (50 mg) by mouth 2 times daily (with meals)    Hypertensive crisis       COENZYME Q-10 PO      Take 100 mg by mouth daily        hydrALAZINE 50 MG tablet    APRESOLINE    60 tablet    Take 1 tablet (50 mg) by mouth 3 times daily    Acute on chronic diastolic heart failure (H)       latanoprost 0.005 % ophthalmic solution    XALATAN     Place 1 drop Into the left eye At Bedtime        LEVETIRACETAM PO      Keppra 500 mg twice daily        LYSINE PO      Take 1,500 mg by mouth daily        MAGNESIUM PO      Take 400 mg by mouth daily        Multi-vitamin Tabs tablet      Take 1 tablet by mouth daily [facility] Support formulation        potassium chloride SA 10 MEQ CR tablet    K-DUR/KLOR-CON M    90 tablet    Take 1 tablet (10 mEq) by mouth daily    (HFpEF) heart failure with preserved ejection fraction (H)       PROBIOTIC PO      Take 1 capsule by mouth daily Brand = Vegyzyme        torsemide 20 MG tablet    DEMADEX    30 tablet    Take 20mg (1 tab) every other day, alternating with 10mg (0.5 tab) every other day    Acute on chronic diastolic congestive heart failure (H)       traZODone 50 MG tablet    DESYREL     Take 25 mg by mouth At Bedtime        UNABLE TO FIND      CPAP every night        vitamin C 500 MG Chew      Take by mouth daily        ZINC 15 PO      Take 15 mg by mouth daily
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
## 2018-06-21 DIAGNOSIS — I50.33 ACUTE ON CHRONIC DIASTOLIC CONGESTIVE HEART FAILURE (H): ICD-10-CM

## 2018-06-21 RX ORDER — TORSEMIDE 20 MG/1
TABLET ORAL
Qty: 30 TABLET | Refills: 6 | Status: SHIPPED | OUTPATIENT
Start: 2018-06-21 | End: 2019-03-26

## 2018-06-27 NOTE — PROGRESS NOTES
HF Telemanagment     Alert for: wt 3# below parameters  Heart Failure sx: none reported  Current diuretic: torsemide 20mg QOD alternating 10mg QOD and KCL 10meq daily   Plan:  Per [facility] PAC's note below, cont to monitor and await assessment and BMP w/ Dr. Miranda tomorrow.          Nina Larsen [facility] RN 9:39 AM 01/18/18  [phone]       Detail Level: Simple Quality 410: Psoriasis Clinical Response To Oral Systemic Or Biologic Mediations: Documentation that the patient declined therapy change Quality 337: Tuberculosis Prevention For Psoriasis And Psoriatic Arthritis Patients On A Biological Immune Response Modifier: No documentation of negative or managed positive TB screen

## 2018-08-07 DIAGNOSIS — I16.9 HYPERTENSIVE CRISIS: ICD-10-CM

## 2018-08-07 RX ORDER — CARVEDILOL 25 MG/1
50 TABLET ORAL 2 TIMES DAILY WITH MEALS
Qty: 180 TABLET | Refills: 3 | Status: SHIPPED | OUTPATIENT
Start: 2018-08-07 | End: 2019-02-06

## 2018-10-30 DIAGNOSIS — I50.30 (HFPEF) HEART FAILURE WITH PRESERVED EJECTION FRACTION (H): ICD-10-CM

## 2018-10-30 RX ORDER — POTASSIUM CHLORIDE 750 MG/1
10 TABLET, EXTENDED RELEASE ORAL DAILY
Qty: 90 TABLET | Refills: 0 | Status: SHIPPED | OUTPATIENT
Start: 2018-10-30 | End: 2019-01-30

## 2018-11-26 ENCOUNTER — HOSPITAL ENCOUNTER (EMERGENCY)
Facility: CLINIC | Age: 68
Discharge: HOME | End: 2018-11-27
Attending: EMERGENCY MEDICINE | Admitting: EMERGENCY MEDICINE
Payer: COMMERCIAL

## 2018-11-26 VITALS
HEIGHT: 67 IN | SYSTOLIC BLOOD PRESSURE: 164 MMHG | DIASTOLIC BLOOD PRESSURE: 82 MMHG | RESPIRATION RATE: 16 BRPM | TEMPERATURE: 97.7 F | BODY MASS INDEX: 29.66 KG/M2 | WEIGHT: 189 LBS | OXYGEN SATURATION: 98 %

## 2018-11-26 DIAGNOSIS — S01.81XA FACIAL LACERATION, INITIAL ENCOUNTER: ICD-10-CM

## 2018-11-26 PROCEDURE — 99283 EMERGENCY DEPT VISIT LOW MDM: CPT

## 2018-11-26 PROCEDURE — 27210282 ZZH ADHESIVE DERMABOND SKIN

## 2018-11-26 PROCEDURE — 12011 RPR F/E/E/N/L/M 2.5 CM/<: CPT

## 2018-11-26 NOTE — ED AVS SNAPSHOT
Emergency Department    [address] [place] [address]    Phone:  [phone]    Fax:  [phone]                                       Aubrey Freeman   MRN: [id number]    Department:   Emergency Department   Date of Visit:  11/26/2018           After Visit Summary Signature Page     I have received my discharge instructions, and my questions have been answered. I have discussed any challenges I see with this plan with the nurse or doctor.    ..........................................................................................................................................  Patient/Patient Representative Signature      ..........................................................................................................................................  Patient Representative Print Name and Relationship to Patient    ..................................................               ................................................  Date                                   Time    ..........................................................................................................................................  Reviewed by Signature/Title    ...................................................              ..............................................  Date                                               Time          22EPIC Rev 08/18

## 2018-11-26 NOTE — ED AVS SNAPSHOT
Emergency Department    [address] [facility] [address]    Phone:  [phone]    Fax:  [phone]                                       Aubrey Freeman   MRN: [id number]    Department:   Emergency Department   Date of Visit:  11/26/2018           Patient Information     Date Of Birth          1950        Your diagnoses for this visit were:     Facial laceration, initial encounter        You were seen by Mykel Barba MD.      Follow-up Information     Follow up with  Emergency Department.    Specialty:  EMERGENCY MEDICINE    Why:  As needed, If symptoms worsen    Contact information:    [address] [facility] [id number] [phone]        Discharge Instructions         Face Laceration: Skin Glue  A laceration is a cut through the skin. A laceration on your face has been closed with skin glue. This is used on cuts that have smooth edges, and are not infected. Skin glue is best used on clean, straight cuts on face in areas that don't get a lot of tension. In some cases, a lower layer of skin may be sutured before skin glue is put on. The skin glue closes the cut within a few minutes. It also provides a water-resistant cover. No bandage is needed. Skin glue peels off on its own within 5 to 10 days.     Home care    Your healthcare provider may prescribe an antibiotic. This is to help prevent infection. Follow all instructions for taking this medicine. Take the medicine every day until it is gone or you are told to stop. You should not have any left over.    The healthcare provider may prescribe medicines for pain. Follow instructions for taking them.    Follow the healthcare provider s instructions on how to care for the cut.    Keep the wound clean and dry. You may shower or bathe as usual, but do not use soaps, lotions, or ointments on the wound area, as these may dissolve the glue. Don't scrub the wound. After bathing, pat the wound dry with a soft  towel. Don't soak the cut in water.    Don't scratch, rub, or pick at the film. Don't place tape directly over the film.    Don't apply liquids such as peroxide, ointments, or creams to the wound while the film is in place.    Most facial skin wounds heal without problems. But an infection sometimes occurs despite proper treatment. Watch for the signs of infection listed below.    Keep the wound out of prolonged direct sunlight, especially in the summer months. Sunburn or sun exposure can increase scarring.  Follow-up care  Follow up with your healthcare provider, or as advised. If skin glue was used, the film will fall off by itself in 5 to10 days.   When to seek medical advice  Call your healthcare provider right away if any of these occur:    Wound bleeds more than a small amount or bleeding doesn't stop    Decreased movement around the injured area    Signs of infection:  ? Increasing pain in the wound  ? Increasing wound redness or swelling  ? Pus or bad odor coming from the wound  ? Fever of 100.4 F (38. C) or as directed by your healthcare provider    Wound edges reopen  Date Last Reviewed: 7/1/2017 2000-2018 [employer]. [address] [place] [address]. All rights reserved. This information is not intended as a substitute for professional medical care. Always follow your healthcare professional's instructions.          Your next 10 appointments already scheduled     Feb 13, 2019 10:45 AM CST   Return Visit with Dave Miranda MD   [facility] ([facility])    [address] [place] [id number] [phone] OPT 2              24 Hour Appointment Hotline       To make an appointment at any [facility], call [phone] ([phone]). If you don't have a family doctor or clinic, we will help you find one. [facility] are conveniently located to serve the needs of you and your family.             Review  of your medicines      Our records show that you are taking the medicines listed below. If these are incorrect, please call your family doctor or clinic.        Dose / Directions Last dose taken    aspirin 81 MG EC tablet   Commonly known as:  ASA   Dose:  81 mg   Quantity:  100 tablet        Take 1 tablet (81 mg) by mouth daily   Refills:  3        carvedilol 25 MG tablet   Commonly known as:  COREG   Dose:  50 mg   Quantity:  180 tablet        Take 2 tablets (50 mg) by mouth 2 times daily (with meals)   Refills:  3        COENZYME Q-10 PO   Dose:  100 mg        Take 100 mg by mouth daily   Refills:  0        hydrALAZINE 50 MG tablet   Commonly known as:  APRESOLINE   Dose:  50 mg   Quantity:  60 tablet        Take 1 tablet (50 mg) by mouth 3 times daily   Refills:  11        latanoprost 0.005 % ophthalmic solution   Commonly known as:  XALATAN   Dose:  1 drop        Place 1 drop Into the left eye At Bedtime   Refills:  0        LEVETIRACETAM PO        Keppra 500 mg twice daily   Refills:  0        LYSINE PO   Dose:  1500 mg        Take 1,500 mg by mouth daily   Refills:  0        MAGNESIUM PO   Dose:  400 mg        Take 400 mg by mouth daily   Refills:  0        Multi-vitamin Tabs tablet   Dose:  1 tablet        Take 1 tablet by mouth daily [facility] Support formulation   Refills:  0        potassium chloride SA 10 MEQ CR tablet   Commonly known as:  K-DUR/KLOR-CON M   Dose:  10 mEq   Quantity:  90 tablet        Take 1 tablet (10 mEq) by mouth daily   Refills:  0        PROBIOTIC PO   Dose:  1 capsule        Take 1 capsule by mouth daily Brand = Vegyzyme   Refills:  0        torsemide 20 MG tablet   Commonly known as:  DEMADEX   Quantity:  30 tablet        Take 20mg (1 tab) every other day, alternating with 10mg (0.5 tab) every other day   Refills:  6        traZODone 50 MG tablet   Commonly known as:  DESYREL   Dose:  25 mg        Take 25 mg by mouth At Bedtime   Refills:  0        UNABLE TO FIND        CPAP every  night   Refills:  0        vitamin C 500 MG Chew        Take by mouth daily   Refills:  0        ZINC 15 PO   Dose:  15 mg        Take 15 mg by mouth daily   Refills:  0                Orders Needing Specimen Collection     None      Pending Results     No orders found for last 3 day(s).            Pending Culture Results     No orders found for last 3 day(s).            Pending Results Instructions     If you had any lab results that were not finalized at the time of your Discharge, you can call the ED Lab Result RN at [phone]. You will be contacted by this team for any positive Lab results or changes in treatment. The nurses are available 7 days a week from 10A to 6:30P.  You can leave a message 24 hours per day and they will return your call.        Test Results From Your Hospital Stay               Clinical Quality Measure: Blood Pressure Screening     Your blood pressure was checked while you were in the emergency department today. The last reading we obtained was  BP: 164/82 . Please read the guidelines below about what these numbers mean and what you should do about them.  If your systolic blood pressure (the top number) is less than 120 and your diastolic blood pressure (the bottom number) is less than 80, then your blood pressure is normal. There is nothing more that you need to do about it.  If your systolic blood pressure (the top number) is 120-139 or your diastolic blood pressure (the bottom number) is 80-89, your blood pressure may be higher than it should be. You should have your blood pressure rechecked within a year by a primary care provider.  If your systolic blood pressure (the top number) is 140 or greater or your diastolic blood pressure (the bottom number) is 90 or greater, you may have high blood pressure. High blood pressure is treatable, but if left untreated over time it can put you at risk for heart attack, stroke, or kidney failure. You should have your blood pressure rechecked by a  "primary care provider within the next 4 weeks.  If your provider in the emergency department today gave you specific instructions to follow-up with your doctor or provider even sooner than that, you should follow that instruction and not wait for up to 4 weeks for your follow-up visit.        Thank you for choosing [facility]       Thank you for choosing [facility] for your care. Our goal is always to provide you with excellent care. Hearing back from our patients is one way we can continue to improve our services. Please take a few minutes to complete the written survey that you may receive in the mail after you visit with us. Thank you!        Lono Information     Lono lets you send messages to your doctor, view your test results, renew your prescriptions, schedule appointments and more. To sign up, go to [email or web address] . Click on \"Log in\" on the left side of the screen, which will take you to the Welcome page. Then click on \"Sign up Now\" on the right side of the page.     You will be asked to enter the access code listed below, as well as some personal information. Please follow the directions to create your username and password.     Your access code is: [email or web address]  Expires: 2019 12:19 AM     Your access code will  in 90 days. If you need help or a new code, please call your [facility] clinic or [phone].        Care EveryWhere ID     This is your Care EveryWhere ID. This could be used by other organizations to access your [facility] medical records  [id number]        Equal Access to Services     CLOVIS MCGEE : Hadii osman garciao Somorales, waaxda luqadaha, qaybta kaalmada enrico stearns . So [facility] [phone].    ATENCIÓN: Si habla español, tiene a rodriguez disposición servicios gratuitos de asistencia lingüística. Llame al [phone].    We comply with applicable federal civil rights laws and [place] laws. We do not discriminate on the basis of " race, color, national origin, age, disability, sex, sexual orientation, or gender identity.            After Visit Summary       This is your record. Keep this with you and show to your community pharmacist(s) and doctor(s) at your next visit.

## 2018-11-27 ASSESSMENT — ENCOUNTER SYMPTOMS: WOUND: 1

## 2018-11-27 NOTE — ED PROVIDER NOTES
"  History     Chief Complaint:  Facial Injury    HPI   Aubrey Freeman is a 68 year old male with history of TBI, CHF, HTN, and HLD who presents for evaluation of a facial injury. Just prior to arrival, he bent over to pick something up off of the ground and stuck his face on the edge of a cutting board table. He presents with a laceration underneath his left eye. Denies loss of consciousness. Last tetanus was in 2014.     Allergies:  No Known Drug Allergies      Medications:    aspirin EC 81 MG EC tablet  carvedilol (COREG) 25 MG tablet  hydrALAZINE (APRESOLINE) 50 MG tablet  LEVETIRACETAM OR  LYSINE PO  MAGNESIUM PO  potassium chloride SA (K-DUR/KLOR-CON M) 10 MEQ CR tablet  torsemide (DEMADEX) 20 MG tablet  traZODone (DESYREL) 50 MG tablet  Zinc Sulfate (ZINC 15 PO)     Past Medical History:    Congestive heart failure  Hypertension  Hyperlipidemia  Obese  Traumatic brain injury    Past Surgical History:    Frontal lobotomy    Family History:    Hypertension  CAD  Abdominal aortic aneurysm  Heart failure    Social History:  Relationship status:   Tobacco use: Former 1 PPD for 2 years. Quit date 1973.   Alcohol use: No  The patient presents with his wife.    Marital Status:   [2]     Review of Systems   Skin: Positive for wound.   All other systems reviewed and are negative.    Physical Exam     Patient Vitals for the past 24 hrs:   BP Temp Temp src Heart Rate Resp SpO2 Height Weight   11/26/18 2319 164/82 97.7  F (36.5  C) Oral 53 16 98 % 1.702 m (5' 7\") 85.7 kg (189 lb)        Physical Exam  General: Appears well-developed and well-nourished.   Head: Very superficial laceration to left cheek.  Mouth/Throat: Oropharynx is clear and moist.   Eyes: Conjunctivae are normal. Pupils are equal, round, and reactive to light.   CV: Normal rate and regular rhythm.    Resp: Effort normal and breath sounds normal. No respiratory distress.   MSK: Normal range of motion.  Neuro: The patient is alert and " oriented.  Speech normal.  GCS 15  Skin: Skin is warm and dry. No rash noted.   Psych: normal mood and affect. behavior is normal.       Emergency Department Course     Procedures:    Narrative: Procedure: Laceration Repair        LACERATION:  A simple, superficial clean 0.5 cm laceration.      LOCATION:  Left cheek      ANESTHESIA:  None      PREPARATION:  Irrigation and Scrubbing with Normal Saline      DEBRIDEMENT:  no debridement      CLOSURE:  Wound was closed with Dermabond       Emergency Department Course:  Nursing notes and vitals reviewed.    0008 I performed an exam of the patient as documented above.   0010 Laceration repair was performed, see detailed note above.     Findings and plan explained to the patient and spouse. Patient discharged home with instructions regarding supportive care, medications, and reasons to return. The importance of close follow-up was reviewed.      I personally answered all related questions prior to discharge.    Impression & Plan      Medical Decision Making:  Aubrey Freeman is a 68-year-old gentleman who presents with a laceration to his left cheek.  He was bending over to pick something up and actually struck left cheek on the corner of a counter.  On my evaluation, there is a very superficial laceration to the left cheek but no other injuries noted.  The wound was cleaned and I did use skin adhesive with good effect.  Patient was instructed to monitor for any signs of infection and to return to the ER for any further concerns.    Diagnosis:    ICD-10-CM    1. Facial laceration, initial encounter S01.81XA         Disposition:   Discharged to home    Discharge Medications:  Discharge Medication List as of 11/27/2018 12:20 AM          Scribe Disclosure:  I, Bernadette Dean, am serving as a scribe at 12:03 AM on 11/27/2018 to document services personally performed by Mykel Barba MD, based on my observations and the provider's statements to me.     Bernadette  Dianne  11/26/2018    EMERGENCY DEPARTMENT       Mykel Barba MD  11/28/18 0122

## 2018-11-27 NOTE — DISCHARGE INSTRUCTIONS
Face Laceration: Skin Glue  A laceration is a cut through the skin. A laceration on your face has been closed with skin glue. This is used on cuts that have smooth edges, and are not infected. Skin glue is best used on clean, straight cuts on face in areas that don't get a lot of tension. In some cases, a lower layer of skin may be sutured before skin glue is put on. The skin glue closes the cut within a few minutes. It also provides a water-resistant cover. No bandage is needed. Skin glue peels off on its own within 5 to 10 days.     Home care    Your healthcare provider may prescribe an antibiotic. This is to help prevent infection. Follow all instructions for taking this medicine. Take the medicine every day until it is gone or you are told to stop. You should not have any left over.    The healthcare provider may prescribe medicines for pain. Follow instructions for taking them.    Follow the healthcare provider s instructions on how to care for the cut.    Keep the wound clean and dry. You may shower or bathe as usual, but do not use soaps, lotions, or ointments on the wound area, as these may dissolve the glue. Don't scrub the wound. After bathing, pat the wound dry with a soft towel. Don't soak the cut in water.    Don't scratch, rub, or pick at the film. Don't place tape directly over the film.    Don't apply liquids such as peroxide, ointments, or creams to the wound while the film is in place.    Most facial skin wounds heal without problems. But an infection sometimes occurs despite proper treatment. Watch for the signs of infection listed below.    Keep the wound out of prolonged direct sunlight, especially in the summer months. Sunburn or sun exposure can increase scarring.  Follow-up care  Follow up with your healthcare provider, or as advised. If skin glue was used, the film will fall off by itself in 5 to10 days.   When to seek medical advice  Call your healthcare provider right away if any of  these occur:    Wound bleeds more than a small amount or bleeding doesn't stop    Decreased movement around the injured area    Signs of infection:  ? Increasing pain in the wound  ? Increasing wound redness or swelling  ? Pus or bad odor coming from the wound  ? Fever of 100.4 F (38. C) or as directed by your healthcare provider    Wound edges reopen  Date Last Reviewed: 7/1/2017 2000-2018 [employer]. [address] [place] [address], [place] [id number]. All rights reserved. This information is not intended as a substitute for professional medical care. Always follow your healthcare professional's instructions.

## 2019-01-29 ENCOUNTER — CARE COORDINATION (OUTPATIENT)
Dept: CARDIOLOGY | Facility: CLINIC | Age: 69
End: 2019-01-29

## 2019-01-29 DIAGNOSIS — I50.33 ACUTE ON CHRONIC DIASTOLIC HEART FAILURE (H): ICD-10-CM

## 2019-01-29 RX ORDER — HYDRALAZINE HYDROCHLORIDE 50 MG/1
50 TABLET, FILM COATED ORAL 3 TIMES DAILY
Qty: 270 TABLET | Refills: 3 | Status: SHIPPED | OUTPATIENT
Start: 2019-01-29 | End: 2020-02-07

## 2019-01-29 NOTE — PROGRESS NOTES
Incoming message from patient w/refill request for hydralazine. Call back to patient. He confirmed hydralazine dose of 50mg tid. Refill sent to [facility] pharmacy, [facility]. Stella Armando RN on 1/29/2019 at 10:26 AM

## 2019-01-30 DIAGNOSIS — I50.30 (HFPEF) HEART FAILURE WITH PRESERVED EJECTION FRACTION (H): ICD-10-CM

## 2019-01-30 RX ORDER — POTASSIUM CHLORIDE 750 MG/1
10 TABLET, EXTENDED RELEASE ORAL DAILY
Qty: 90 TABLET | Refills: 0 | Status: SHIPPED | OUTPATIENT
Start: 2019-01-30 | End: 2019-04-30

## 2019-02-06 DIAGNOSIS — I16.9 HYPERTENSIVE CRISIS: ICD-10-CM

## 2019-02-06 RX ORDER — CARVEDILOL 25 MG/1
50 TABLET ORAL 2 TIMES DAILY WITH MEALS
Qty: 360 TABLET | Refills: 0 | Status: SHIPPED | OUTPATIENT
Start: 2019-02-06 | End: 2019-05-10

## 2019-02-13 ENCOUNTER — OFFICE VISIT (OUTPATIENT)
Dept: CARDIOLOGY | Facility: CLINIC | Age: 69
End: 2019-02-13
Attending: INTERNAL MEDICINE
Payer: COMMERCIAL

## 2019-02-13 VITALS
HEIGHT: 67 IN | BODY MASS INDEX: 30.76 KG/M2 | DIASTOLIC BLOOD PRESSURE: 80 MMHG | SYSTOLIC BLOOD PRESSURE: 130 MMHG | HEART RATE: 48 BPM | WEIGHT: 196 LBS

## 2019-02-13 DIAGNOSIS — R07.9 ACUTE CHEST PAIN: ICD-10-CM

## 2019-02-13 DIAGNOSIS — I50.31 ACUTE DIASTOLIC HEART FAILURE (H): ICD-10-CM

## 2019-02-13 DIAGNOSIS — I16.9 HYPERTENSIVE CRISIS: ICD-10-CM

## 2019-02-13 DIAGNOSIS — J81.0 ACUTE PULMONARY EDEMA (H): ICD-10-CM

## 2019-02-13 DIAGNOSIS — E78.5 HYPERLIPIDEMIA LDL GOAL <100: ICD-10-CM

## 2019-02-13 DIAGNOSIS — I50.21 ACUTE SYSTOLIC CONGESTIVE HEART FAILURE (H): ICD-10-CM

## 2019-02-13 DIAGNOSIS — I50.33 ACUTE ON CHRONIC DIASTOLIC HEART FAILURE (H): Primary | ICD-10-CM

## 2019-02-13 DIAGNOSIS — I50.30 (HFPEF) HEART FAILURE WITH PRESERVED EJECTION FRACTION (H): ICD-10-CM

## 2019-02-13 PROCEDURE — 99214 OFFICE O/P EST MOD 30 MIN: CPT | Performed by: INTERNAL MEDICINE

## 2019-02-13 RX ORDER — GARLIC 180 MG
30 TABLET, DELAYED RELEASE (ENTERIC COATED) ORAL DAILY
COMMUNITY

## 2019-02-13 ASSESSMENT — MIFFLIN-ST. JEOR: SCORE: 1622.68
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
## 2019-03-26 DIAGNOSIS — I50.33 ACUTE ON CHRONIC DIASTOLIC CONGESTIVE HEART FAILURE (H): ICD-10-CM

## 2019-03-26 RX ORDER — TORSEMIDE 20 MG/1
TABLET ORAL
Qty: 66 TABLET | Refills: 3 | Status: SHIPPED | OUTPATIENT
Start: 2019-03-26 | End: 2019-10-29

## 2019-04-30 DIAGNOSIS — I50.30 (HFPEF) HEART FAILURE WITH PRESERVED EJECTION FRACTION (H): ICD-10-CM

## 2019-04-30 RX ORDER — POTASSIUM CHLORIDE 750 MG/1
10 TABLET, EXTENDED RELEASE ORAL DAILY
Qty: 90 TABLET | Refills: 3 | Status: SHIPPED | OUTPATIENT
Start: 2019-04-30 | End: 2019-10-29

## 2019-05-10 DIAGNOSIS — I16.9 HYPERTENSIVE CRISIS: ICD-10-CM

## 2019-05-10 RX ORDER — CARVEDILOL 25 MG/1
50 TABLET ORAL 2 TIMES DAILY WITH MEALS
Qty: 360 TABLET | Refills: 2 | Status: SHIPPED | OUTPATIENT
Start: 2019-05-10

## 2019-10-06 ENCOUNTER — MEDICAL CORRESPONDENCE (OUTPATIENT)
Dept: HEALTH INFORMATION MANAGEMENT | Facility: CLINIC | Age: 69
End: 2019-10-06

## 2019-10-10 ENCOUNTER — TELEPHONE (OUTPATIENT)
Dept: CARDIOLOGY | Facility: CLINIC | Age: 69
End: 2019-10-10

## 2019-10-10 DIAGNOSIS — R06.02 SOB (SHORTNESS OF BREATH): Primary | ICD-10-CM

## 2019-10-10 NOTE — TELEPHONE ENCOUNTER
RN received letter from patient's wife expressing some concern about SOB after patient takes his hydralazine. Patient's wife writes in letter that patient's weights have been stable, but she is requesting guidance from Dr. Miranda. RN called patient's wife and left VM advising her to call clinic to provide some more details about her concerns.

## 2019-10-11 NOTE — TELEPHONE ENCOUNTER
Patient's wife returned call and advised this RN that patient has been having some progressively increasing SOB over the past couple of months. Patient's wife confirmed that patient is taking his medications as rx. Patient's wife reports that patient's weight has been stable between 175-178lb's over the past couple of months. Patient's wife advised that intermittently patient can become SOB at rest. RN reviewed with patient's wife that we would like to schedule patient for an OV with Dr. Miranda's OSCAR to assess patient and discuss plan. RN again confirmed with patient's wife that change in patient's SOB is not acute and patient's wife confirmed this and reports that it has happened over the past couple of months. RN verbalized understanding and advised patient's wife to call clinic prior to OV if patient's condition worsens. Patient's wife is in agreement with plan. RN transferred patient's wife to scheduling to arrange apt.

## 2019-10-21 ENCOUNTER — CARE COORDINATION (OUTPATIENT)
Dept: CARDIOLOGY | Facility: CLINIC | Age: 69
End: 2019-10-21

## 2019-10-21 NOTE — PROGRESS NOTES
Received phone call from patient's wife regarding patient's symptoms. Patient's wife Kristen states that his breathing has gotten a little bit more SOB with activity. She's noticed that he's more SOB when he's showering, getting dressed for the day and when activity requires exertion. Weight's have been stable around 176-177#. No swelling in abdomen or LE. Patient wears a CPAP at night, she has not noticed an increased SOB while sleeping. They both follow a low salt diet, as she does most of the cooking and does not add salt. They are both very active people, but she's been noticing within the last few weeks he seems to fatigue more easily. I have rescheduled his appointment to see Lily sooner than their previous appointment. Instructed patient and patient's wife to call if he experiences any symptoms or his weight goes up.     Future Appointments   Date Time Provider Department Center   10/29/2019  7:30 AM Hope Che PA-C [facility]   10/29/2019  8:30 AM MIRANDA LAB SULAB [facility]        JACQUI Colunga October 21, 2019 11:40 AM

## 2019-10-23 DIAGNOSIS — I50.9 CHF (CONGESTIVE HEART FAILURE) (H): Primary | ICD-10-CM

## 2019-10-25 DIAGNOSIS — I50.9 CONGESTIVE HEART FAILURE, UNSPECIFIED HF CHRONICITY, UNSPECIFIED HEART FAILURE TYPE (H): Primary | ICD-10-CM

## 2019-10-25 NOTE — PROGRESS NOTES
Orders for BMP, Hgb, TSH entered for patients appt on 10/29 sonya/Laura Che.  Stella Armando RN on 10/25/2019 at 9:21 AM

## 2019-10-29 ENCOUNTER — CARE COORDINATION (OUTPATIENT)
Dept: CARDIOLOGY | Facility: CLINIC | Age: 69
End: 2019-10-29

## 2019-10-29 ENCOUNTER — OFFICE VISIT (OUTPATIENT)
Dept: CARDIOLOGY | Facility: CLINIC | Age: 69
End: 2019-10-29
Payer: COMMERCIAL

## 2019-10-29 VITALS
BODY MASS INDEX: 29.19 KG/M2 | HEIGHT: 67 IN | OXYGEN SATURATION: 96 % | SYSTOLIC BLOOD PRESSURE: 130 MMHG | HEART RATE: 58 BPM | WEIGHT: 186 LBS | DIASTOLIC BLOOD PRESSURE: 80 MMHG

## 2019-10-29 DIAGNOSIS — I50.30 (HFPEF) HEART FAILURE WITH PRESERVED EJECTION FRACTION (H): ICD-10-CM

## 2019-10-29 DIAGNOSIS — I50.32 CHRONIC HEART FAILURE WITH PRESERVED EJECTION FRACTION (H): Primary | ICD-10-CM

## 2019-10-29 DIAGNOSIS — I10 HYPERTENSION GOAL BP (BLOOD PRESSURE) < 130/80: ICD-10-CM

## 2019-10-29 DIAGNOSIS — I50.9 CONGESTIVE HEART FAILURE, UNSPECIFIED HF CHRONICITY, UNSPECIFIED HEART FAILURE TYPE (H): ICD-10-CM

## 2019-10-29 DIAGNOSIS — I73.9 PAD (PERIPHERAL ARTERY DISEASE) (H): ICD-10-CM

## 2019-10-29 DIAGNOSIS — I50.33 ACUTE ON CHRONIC DIASTOLIC CONGESTIVE HEART FAILURE (H): ICD-10-CM

## 2019-10-29 LAB
ANION GAP SERPL CALCULATED.3IONS-SCNC: 11.2 MMOL/L (ref 6–17)
BUN SERPL-MCNC: 25 MG/DL (ref 7–30)
CALCIUM SERPL-MCNC: 9.2 MG/DL (ref 8.5–10.5)
CHLORIDE SERPL-SCNC: 99 MMOL/L (ref 98–107)
CO2 SERPL-SCNC: 28 MMOL/L (ref 23–29)
CREAT SERPL-MCNC: 1.51 MG/DL (ref 0.7–1.3)
GFR SERPL CREATININE-BSD FRML MDRD: 46 ML/MIN/{1.73_M2}
GLUCOSE SERPL-MCNC: 113 MG/DL (ref 70–105)
HGB BLD-MCNC: 11.2 G/DL (ref 13.3–17.7)
POTASSIUM SERPL-SCNC: 3.2 MMOL/L (ref 3.5–5.1)
SODIUM SERPL-SCNC: 135 MMOL/L (ref 136–145)
TSH SERPL DL<=0.005 MIU/L-ACNC: 3.73 MU/L (ref 0.4–4)

## 2019-10-29 PROCEDURE — 85018 HEMOGLOBIN: CPT | Performed by: INTERNAL MEDICINE

## 2019-10-29 PROCEDURE — 80048 BASIC METABOLIC PNL TOTAL CA: CPT | Performed by: INTERNAL MEDICINE

## 2019-10-29 PROCEDURE — 99214 OFFICE O/P EST MOD 30 MIN: CPT | Performed by: PHYSICIAN ASSISTANT

## 2019-10-29 PROCEDURE — 36415 COLL VENOUS BLD VENIPUNCTURE: CPT | Performed by: INTERNAL MEDICINE

## 2019-10-29 PROCEDURE — 84443 ASSAY THYROID STIM HORMONE: CPT | Performed by: INTERNAL MEDICINE

## 2019-10-29 RX ORDER — TORSEMIDE 10 MG/1
10 TABLET ORAL DAILY
COMMUNITY
Start: 2019-10-29 | End: 2019-12-17

## 2019-10-29 RX ORDER — POTASSIUM CHLORIDE 750 MG/1
20 TABLET, EXTENDED RELEASE ORAL DAILY
Qty: 90 TABLET | Refills: 3 | COMMUNITY
Start: 2019-10-29 | End: 2019-12-16

## 2019-10-29 ASSESSMENT — MIFFLIN-ST. JEOR: SCORE: 1577.32
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
## 2019-10-29 NOTE — PROGRESS NOTES
Please let them know - his hemogloin is still mildly low but stable/better than in 2017. TSH is normal. His BMP shows worsening renal insufficiency, some mild hyponatremia, and his potassium is low. I'd suggest cutting Torsemide back slightly to 10 mg every day. I would also have him take 40 meq of potassium today and then increase his potassium to 20 meq daily. Let's repeat a BMP in 1-2 weeks to re-evaluate. They should call with weight gain or worsening symptoms. Thanks. da       Called patient with recommendations from visit today. Patient confirmed understanding. Scheduled for follow up lab next week. Instructed to call if they have any further questions/symptoms going forward.    Future Appointments   Date Time Provider Department Center   11/7/2019 11:00 AM MIRANDA SAULO Colunga RN October 29, 2019 2:20 PM
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
## 2019-11-07 DIAGNOSIS — I50.33 ACUTE ON CHRONIC DIASTOLIC CONGESTIVE HEART FAILURE (H): ICD-10-CM

## 2019-11-07 DIAGNOSIS — I50.9 CHF (CONGESTIVE HEART FAILURE) (H): ICD-10-CM

## 2019-11-07 LAB
ANION GAP SERPL CALCULATED.3IONS-SCNC: 10.9 MMOL/L (ref 6–17)
BUN SERPL-MCNC: 22 MG/DL (ref 7–30)
CALCIUM SERPL-MCNC: 9.1 MG/DL (ref 8.5–10.5)
CHLORIDE SERPL-SCNC: 100 MMOL/L (ref 98–107)
CO2 SERPL-SCNC: 28 MMOL/L (ref 23–29)
CREAT SERPL-MCNC: 1.19 MG/DL (ref 0.7–1.3)
GFR SERPL CREATININE-BSD FRML MDRD: 61 ML/MIN/{1.73_M2}
GLUCOSE SERPL-MCNC: 133 MG/DL (ref 70–105)
POTASSIUM SERPL-SCNC: 3.9 MMOL/L (ref 3.5–5.1)
SODIUM SERPL-SCNC: 135 MMOL/L (ref 136–145)

## 2019-11-07 PROCEDURE — 36415 COLL VENOUS BLD VENIPUNCTURE: CPT | Performed by: INTERNAL MEDICINE

## 2019-11-07 PROCEDURE — 80048 BASIC METABOLIC PNL TOTAL CA: CPT | Performed by: INTERNAL MEDICINE

## 2019-11-08 NOTE — RESULT ENCOUNTER NOTE
Notes recorded by Hope Che PA-C on 11/8/2019 at 10:25 AM CST  Please let him know his renal function is much better after the medication adjustments. I'd continue on his current dose of Torsemide if he is otherwise stable. Thanks. Zoila    Called pt, no answer. LVM letting him know lab work much better after medication adjustments. Asked for pt to call back with an update on how he is feeling. Reviewed that Laura recommends continue current dose of Torsemide if wt and sx stable. Again asked pt to call back with update on how he is doing. JACQUI Mckeon 10:42 AM 11/08/19

## 2019-12-16 DIAGNOSIS — I50.30 (HFPEF) HEART FAILURE WITH PRESERVED EJECTION FRACTION (H): ICD-10-CM

## 2019-12-16 RX ORDER — POTASSIUM CHLORIDE 750 MG/1
20 TABLET, EXTENDED RELEASE ORAL DAILY
Qty: 180 TABLET | Refills: 0 | Status: SHIPPED | OUTPATIENT
Start: 2019-12-16 | End: 2020-03-16

## 2019-12-17 RX ORDER — TORSEMIDE 10 MG/1
10 TABLET ORAL DAILY
Qty: 90 TABLET | Refills: 3 | Status: SHIPPED | OUTPATIENT
Start: 2019-12-17

## 2019-12-17 NOTE — PROGRESS NOTES
Received refill request from [facility] for torsemide clarification. Sent updated rx for torsemide 10mg daily per Laura Che PAC recs below.    Future Appointments   Date Time Provider Department Center   2/12/2020 10:45 AM Dave Miranda MD [facility]         Nina Larsen RN BSN   11:31 AM 12/17/19

## 2020-02-07 DIAGNOSIS — I50.33 ACUTE ON CHRONIC DIASTOLIC HEART FAILURE (H): ICD-10-CM

## 2020-02-07 RX ORDER — HYDRALAZINE HYDROCHLORIDE 50 MG/1
50 TABLET, FILM COATED ORAL 3 TIMES DAILY
Qty: 270 TABLET | Refills: 0 | Status: SHIPPED | OUTPATIENT
Start: 2020-02-07 | End: 2020-05-04

## 2020-02-12 ENCOUNTER — OFFICE VISIT (OUTPATIENT)
Dept: CARDIOLOGY | Facility: CLINIC | Age: 70
End: 2020-02-12
Attending: INTERNAL MEDICINE
Payer: COMMERCIAL

## 2020-02-12 VITALS
BODY MASS INDEX: 30.29 KG/M2 | HEIGHT: 67 IN | SYSTOLIC BLOOD PRESSURE: 120 MMHG | DIASTOLIC BLOOD PRESSURE: 78 MMHG | WEIGHT: 193 LBS | HEART RATE: 54 BPM

## 2020-02-12 DIAGNOSIS — E78.5 HYPERLIPIDEMIA LDL GOAL <100: ICD-10-CM

## 2020-02-12 DIAGNOSIS — I50.33 ACUTE ON CHRONIC DIASTOLIC HEART FAILURE (H): Primary | ICD-10-CM

## 2020-02-12 DIAGNOSIS — I10 HYPERTENSION GOAL BP (BLOOD PRESSURE) < 130/80: ICD-10-CM

## 2020-02-12 PROCEDURE — 99214 OFFICE O/P EST MOD 30 MIN: CPT | Performed by: INTERNAL MEDICINE

## 2020-02-12 ASSESSMENT — MIFFLIN-ST. JEOR: SCORE: 1604.07
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
## 2020-03-16 DIAGNOSIS — I50.30 (HFPEF) HEART FAILURE WITH PRESERVED EJECTION FRACTION (H): ICD-10-CM

## 2020-03-16 RX ORDER — POTASSIUM CHLORIDE 750 MG/1
20 TABLET, EXTENDED RELEASE ORAL DAILY
Qty: 180 TABLET | Refills: 3 | Status: SHIPPED | OUTPATIENT
Start: 2020-03-16

## 2020-05-04 DIAGNOSIS — I50.33 ACUTE ON CHRONIC DIASTOLIC HEART FAILURE (H): ICD-10-CM

## 2020-05-04 RX ORDER — HYDRALAZINE HYDROCHLORIDE 50 MG/1
50 TABLET, FILM COATED ORAL 3 TIMES DAILY
Qty: 270 TABLET | Refills: 3 | Status: SHIPPED | OUTPATIENT
Start: 2020-05-04

## 2021-10-08 NOTE — DISCHARGE SUMMARY
[facility] A department of [facility]  Phone: [phone]  Fax: [phone]    Jamie Berger MD        2021     Patient: Piotr Darby   YOB: 1974   Date of Visit: 10/8/2021       To Whom It May Concern: It is my medical opinion that Piotr Darby may return to work on 10/8/21 with the following restrictions: avoid excessive walking or standing, she needs a stool available so that she can sit for at least 20 minutes every 2 hours. Restrictions  10/8/22    If you have any questions or concerns, please don't hesitate to call.     Sincerely,        Jamie Berger MD Hospitalist Discharge Summary    Aubrey Freeman MRN# [id number]   YOB: 1950 Age: 67 year old     Date of Admission:  11/7/2017  Date of Discharge:  11/10/2017  Admitting Physician:  Isidro Julien MD  Discharge Physician:  Mykel Tang  Discharging Service:  Hospitalist     Primary Provider: Bethany Cohen          Discharge Diagnosis:   Acute hypoxic respiratory failure due to ADHF  ADHF, diastolic CHF  HTN  TBI  JESSE, resolved  Suspect some degree of minimal CKD  Hypervolemic hyponatremia, improved  SOCO on CPAP             Discharge Disposition:   Discharged to home           Allergies:   Allergies   Allergen Reactions     No Known Drug Allergies               Discharge Medications:   Current Discharge Medication List      START taking these medications    Details   amLODIPine (NORVASC) 5 MG tablet Take 1 tablet (5 mg) by mouth 2 times daily  Qty: 60 tablet, Refills: 1    Associated Diagnoses: Hypertension goal BP (blood pressure) < 130/80      furosemide (LASIX) 20 MG tablet Take 1 tablet (20 mg) by mouth 2 times daily  Qty: 30 tablet, Refills: 0    Associated Diagnoses: Acute pulmonary edema (H)         CONTINUE these medications which have NOT CHANGED    Details   aspirin EC 81 MG EC tablet Take 1 tablet (81 mg) by mouth daily  Qty: 100 tablet, Refills: 3    Comments: For prevention of heart disease  Associated Diagnoses: Acute systolic congestive heart failure (H)      hydrALAZINE (APRESOLINE) 50 MG tablet Take 1 tablet (50 mg) by mouth 3 times daily  Qty: 90 tablet, Refills: 3    Comments: For high blood pressure  Associated Diagnoses: Hypertensive crisis      carvedilol (COREG) 25 MG tablet Take 1.5 tablets (37.5 mg) by mouth 2 times daily (with meals)  Qty: 60 tablet, Refills: 3    Comments: For high blood pressure  Associated Diagnoses: Hypertensive crisis      traZODone (DESYREL) 50 MG tablet Take 25 mg by mouth At Bedtime      latanoprost (XALATAN) 0.005 % ophthalmic  "solution Place 1 drop Into the left eye At Bedtime      ZINC ACETATE 1 tablet daily       CO Q 10 OR 1 tab daily      LYSINE 1 tab daily      LEVETIRACETAM OR Keppra 500 mg twice daily         STOP taking these medications       NIFEdipine ER (ADALAT CC) 60 MG 24 hr tablet Comments:   Reason for Stopping:                      Condition on Discharge:   Discharge condition: Stable   Discharge vitals: Blood pressure 133/86, pulse 61, temperature 97.4  F (36.3  C), temperature source Axillary, resp. rate 16, height 1.702 m (5' 7.01\"), weight 86.1 kg (189 lb 14.4 oz), SpO2 97 %.   Code status on discharge: Full Code      BASIC PHYSICAL EXAMINATION:  GENERAL: No apparent distress.  CARDIOVASCULAR: Regular rate and rhythm without murmurs.  PULMONARY: Clear to auscultation bilaterally.   GASTROINTESTINAL: Abdomen soft, non-tender.  EXTREMITIES: No edema, pulses intact.  NEUROLOGIC: No focal deficits.          History of Illness:   See detailed admission note for full details.               Procedures excluding imaging which is summarized below:   See EMR           Consultations:   [facility] CLINIC EVALUATION IP CONSULT  CARDIAC REHAB IP CONSULT  CARE COORDINATOR IP CONSULT  NUTRITION SERVICES ADULT IP CONSULT  CARDIOLOGY IP CONSULT          Significant Results:   Results for orders placed or performed during the hospital encounter of 11/07/17   XR Chest Port 1 View    Narrative    CHEST SINGLE VIEW PORTABLE  11/7/2017 3:08 AM     HISTORY: Congestive heart failure exacerbation.    COMPARISON: 10/26/2017.    FINDINGS: Mildly to moderately prominent interstitial opacities within  both lungs. A small air-space opacity in the medial aspect of the left  lung base in the retrocardiac region, new. The size of the cardiac  silhouette is within normal limits. Atherosclerotic calcification in  the thoracic aorta. Possible small bilateral pleural effusions.      Impression    IMPRESSION:  1. Mild to moderately prominent interstitial " opacities in both lungs,  most likely representing interstitial pulmonary edema.  2. A small air-space opacity in the left lung base could represent  atelectasis or pneumonia.  3. Possible small bilateral pleural effusions.    IAN RODRIGUEZ MD                Pending Results:   Unresulted Labs Ordered in the Past 30 Days of this Admission     No orders found from 9/8/2017 to 11/8/2017.                        Discharge Instructions and Follow-Up:   Discharge instructions and follow-up:   Discharge Procedure Orders  Follow-up and recommended labs and tests    Order Comments: Follow up with primary care provider, Bethany Cohen, within 7 days for hospital follow- up.  The following labs/tests are recommended: BMP.     Activity   Order Comments: Your activity upon discharge: activity as tolerated   Order Specific Question Answer Comments   Is discharge order? Yes      Full Code     Diet   Order Comments: Follow this diet upon discharge: Orders Placed This Encounter     Regular Diet Adult   Order Specific Question Answer Comments   Is discharge order? Yes                Hospital Course:   Aubrey Freeman is a 67 year old male with a history of TBI, HFpEF, HTN admitted on 11/7/2017 with acute respiratory failure due to ADHF. He had a recent TTE and has preserved EF. Cardiology was consulted during the hospitalization. He responded very well to IV lasix and rapid improvement in his breathing. He required BiPAP on admission but he is now doing well on RA.     Lisinopril was briefly started to assist with BP but he developed worsening renal function with ACEi and diuretics so they were both held briefly. He was resumed on oral lasix yesterday and renal function has normalized. His PTA nifedipine has been discontinued and replaced with amlodipine (increased to 5 mg BID today by cardiology). To note, he does have some hypervolemic hyponatremia. Na trended down to 124 but has improved to 128 today with liberalization  of his diet and despite resuming oral lasix. His hyponatremia is improve and symptomatic. He feels very well today and appears stable to discharge home and f/u with PCP next week for BMP.    The patient was seen, examined, and counseled on this day. The hospitalization and plan of care was reviewed with the patient extensively. I have placed a call to his wife the past two days and left a voicemail each day providing updates on his status and plan of care. All questions were addressed and the patient agreed to follow-up as noted above.      Total time spent in face to face contact with the patient and coordinating discharge was:  35 Minutes    Mykel Tang DO MPH  [facility] Hospitalist  [address] [place] [address]  Pager: [phone]  11/10/2017

## 2022-01-04 NOTE — PATIENT INSTRUCTIONS
Call [facility] nurse for any questions or concerns Mon-Fri 8am-4pm:                                                 #[phone]                                       For concerns after hours:                                               #[phone]      1: Medication changes: no medication changes today.  2: Plan from today: labs today to check your kidney function, blood counts, and fluid marker.   -  Work on getting back into your regular exercise routine - swimming or jogging at the gym - a few times a week.   3: Lab results: we'll call you with your lab results.      visitation policy explained

## 2024-10-10 NOTE — PROGRESS NOTES
Called pt's wife. We reviewed need to change diuretic as pt not responding to lasix. Gave her instructions to stop lasix and start bumex 1mg BID. She wrote down instructions and verbalized understanding. I asked her to call if pt's wt <190#. Reviewed w/ her BMP and OV 12/4. Rx sent to pharmacy, med list updated.    Nina Larsen [facility] RN 8:56 AM 11/28/17  [phone]       82 Negative
